# Patient Record
Sex: MALE | Race: WHITE | Employment: OTHER | URBAN - NONMETROPOLITAN AREA
[De-identification: names, ages, dates, MRNs, and addresses within clinical notes are randomized per-mention and may not be internally consistent; named-entity substitution may affect disease eponyms.]

---

## 2017-10-23 ENCOUNTER — OFFICE VISIT (OUTPATIENT)
Dept: INTERNAL MEDICINE | Age: 62
End: 2017-10-23
Payer: COMMERCIAL

## 2017-10-23 VITALS
DIASTOLIC BLOOD PRESSURE: 80 MMHG | BODY MASS INDEX: 27.21 KG/M2 | WEIGHT: 212 LBS | SYSTOLIC BLOOD PRESSURE: 120 MMHG | RESPIRATION RATE: 16 BRPM | HEIGHT: 74 IN | HEART RATE: 68 BPM

## 2017-10-23 DIAGNOSIS — D17.1 LIPOMA OF TORSO: ICD-10-CM

## 2017-10-23 DIAGNOSIS — Z12.5 SPECIAL SCREENING FOR MALIGNANT NEOPLASM OF PROSTATE: ICD-10-CM
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
## 2017-10-23 DIAGNOSIS — E78.5 HYPERLIPIDEMIA, UNSPECIFIED HYPERLIPIDEMIA TYPE: ICD-10-CM

## 2017-10-23 DIAGNOSIS — Z00.00 GENERAL MEDICAL EXAM: Primary | ICD-10-CM

## 2017-10-23 PROCEDURE — 99396 PREV VISIT EST AGE 40-64: CPT | Performed by: INTERNAL MEDICINE

## 2017-10-23 ASSESSMENT — ENCOUNTER SYMPTOMS
BACK PAIN: 0
NAUSEA: 0
CONSTIPATION: 0
EYE PAIN: 0
ABDOMINAL PAIN: 0
COUGH: 0
VOMITING: 0
DIARRHEA: 0
SHORTNESS OF BREATH: 0
BLOOD IN STOOL: 0

## 2017-10-23 ASSESSMENT — PATIENT HEALTH QUESTIONNAIRE - PHQ9
1. LITTLE INTEREST OR PLEASURE IN DOING THINGS: 0
SUM OF ALL RESPONSES TO PHQ QUESTIONS 1-9: 0
2. FEELING DOWN, DEPRESSED OR HOPELESS: 0
SUM OF ALL RESPONSES TO PHQ9 QUESTIONS 1 & 2: 0

## 2017-10-23 NOTE — PROGRESS NOTES
Michelle Lyle received counseling on the following healthy behaviors: exercise  Reviewed prior labs and health maintenance  Continue current medications except where noted below, diet and exercise. Discussed use, benefit, and side effects of prescribed medications. Barriers to medication compliance addressed. Patient given educational materials - see patient instructions  Was a self-tracking handout given in paper form or via [employer]? No    Requested Prescriptions      No prescriptions requested or ordered in this encounter       All patient questions answered. Patient voiced understanding. Quality Measures    Body mass index is 27.22 kg/m². Elevated. Weight control planned discussed: Healthy diet and regular exercise    BP: 120/80 Blood pressure is normal. Treatment plan consists of: see progress note below.       Lab Results   Component Value Date    LDLCHOLESTEROL 126 10/06/2016    (goal LDL reduction with dx if diabetes is 50% LDL reduction)      PHQ Scores 10/23/2017   PHQ2 Score 0   PHQ9 Score 0     Interpretation of Total Score Depression Severity: 1-4 = Minimal depression, 5-9 = Mild depression, 10-14 = Moderate depression, 15-19 = Moderately severe depression, 20-27 = Severe depression

## 2017-10-23 NOTE — PROGRESS NOTES
Chronic Disease Visit Information    BP Readings from Last 3 Encounters:   10/23/17 120/80   10/17/16 94/60   10/08/15 98/76          LDL Cholesterol (mg/dL)   Date Value   10/06/2016 126     HDL (mg/dL)   Date Value   10/06/2016 51     BUN (mg/dL)   Date Value   10/06/2016 21     CREATININE (mg/dL)   Date Value   10/06/2016 1.01     Glucose (mg/dL)   Date Value   10/06/2016 97            Have you changed or started any medications since your last visit including any over-the-counter medicines, vitamins, or herbal medicines? no   Are you having any side effects from any of your medications? -  no  Have you stopped taking any of your medications? Is so, why? -  no    Have you seen any other physician or provider since your last visit? No  Have you had any other diagnostic tests since your last visit? No  Have you been seen in the emergency room and/or had an admission to a hospital since we last saw you? No  Have you had your annual diabetic retinal (eye) exam? No/ NA  Have you had your routine dental cleaning in the past 6 months? yes - every 6 months    Have you activated your [employer] account? If not, what are your barriers?  Yes     Patient Care Team:  Faina Paredes MD as PCP - General (Internal Medicine)  Faina Paredes MD as PCP - [facility] Attributed Provider         Medical History Review  Past Medical, Family, and Social History reviewed and does contribute to the patient presenting condition    Health Maintenance   Topic Date Due    Zostavax vaccine  10/23/2018 (Originally 10/19/2015)    Flu vaccine (1) 10/23/2018 (Originally 9/1/2017)    Hepatitis C screen  10/23/2018 (Originally 1955)    HIV screen  10/23/2018 (Originally 10/19/1970)    Colon cancer screen colonoscopy  10/27/2020    Lipid screen  10/06/2021    DTaP/Tdap/Td vaccine (2 - Td) 05/19/2022

## 2017-10-23 NOTE — PATIENT INSTRUCTIONS

## 2017-10-24 ENCOUNTER — HOSPITAL ENCOUNTER (OUTPATIENT)
Dept: LAB | Age: 62
Setting detail: SPECIMEN
Discharge: HOME | End: 2017-10-24
Payer: COMMERCIAL

## 2017-10-24 DIAGNOSIS — Z12.5 SPECIAL SCREENING FOR MALIGNANT NEOPLASM OF PROSTATE: ICD-10-CM

## 2017-10-24 DIAGNOSIS — E78.5 HYPERLIPIDEMIA, UNSPECIFIED HYPERLIPIDEMIA TYPE: ICD-10-CM

## 2017-10-24 DIAGNOSIS — Z00.00 GENERAL MEDICAL EXAM: ICD-10-CM

## 2017-10-24 LAB
ALBUMIN SERPL-MCNC: 4.1 G/DL (ref 3.5–5.2)
ALBUMIN/GLOBULIN RATIO: 1.4 (ref 1–2.5)
ALP BLD-CCNC: 63 U/L (ref 40–129)
ALT SERPL-CCNC: 19 U/L (ref 5–41)
ANION GAP SERPL CALCULATED.3IONS-SCNC: 11 MMOL/L (ref 9–17)
AST SERPL-CCNC: 20 U/L
BILIRUB SERPL-MCNC: 0.84 MG/DL (ref 0.3–1.2)
BUN BLDV-MCNC: 23 MG/DL (ref 8–23)
BUN/CREAT BLD: 26 (ref 9–20)
CALCIUM SERPL-MCNC: 9.4 MG/DL (ref 8.6–10.4)
CHLORIDE BLD-SCNC: 99 MMOL/L (ref 98–107)
CHOLESTEROL/HDL RATIO: 4.9
CHOLESTEROL: 201 MG/DL
CO2: 29 MMOL/L (ref 20–31)
CREAT SERPL-MCNC: 0.9 MG/DL (ref 0.7–1.2)
GFR AFRICAN AMERICAN: >60 ML/MIN
GFR NON-AFRICAN AMERICAN: >60 ML/MIN
GFR SERPL CREATININE-BSD FRML MDRD: ABNORMAL ML/MIN/{1.73_M2}
GFR SERPL CREATININE-BSD FRML MDRD: ABNORMAL ML/MIN/{1.73_M2}
GLUCOSE BLD-MCNC: 101 MG/DL (ref 70–99)
HDLC SERPL-MCNC: 41 MG/DL
LDL CHOLESTEROL: 134 MG/DL (ref 0–130)
POTASSIUM SERPL-SCNC: 4.1 MMOL/L (ref 3.7–5.3)
PROSTATE SPECIFIC ANTIGEN: 2.47 UG/L
SODIUM BLD-SCNC: 139 MMOL/L (ref 135–144)
TOTAL PROTEIN: 7 G/DL (ref 6.4–8.3)
TRIGL SERPL-MCNC: 130 MG/DL
VLDLC SERPL CALC-MCNC: ABNORMAL MG/DL (ref 1–30)

## 2017-10-24 PROCEDURE — 80061 LIPID PANEL: CPT

## 2017-10-24 PROCEDURE — 36415 COLL VENOUS BLD VENIPUNCTURE: CPT

## 2017-10-24 PROCEDURE — G0103 PSA SCREENING: HCPCS

## 2017-10-24 PROCEDURE — 80053 COMPREHEN METABOLIC PANEL: CPT

## 2017-10-25 DIAGNOSIS — E78.5 HYPERLIPIDEMIA, UNSPECIFIED HYPERLIPIDEMIA TYPE: Primary | ICD-10-CM

## 2017-10-25 DIAGNOSIS — Z12.5 SPECIAL SCREENING FOR MALIGNANT NEOPLASM OF PROSTATE: ICD-10-CM

## 2018-10-24 ENCOUNTER — HOSPITAL ENCOUNTER (OUTPATIENT)
Dept: LAB | Age: 63
Discharge: HOME | End: 2018-10-24
Payer: COMMERCIAL

## 2018-10-24 ENCOUNTER — OFFICE VISIT (OUTPATIENT)
Dept: INTERNAL MEDICINE | Age: 63
End: 2018-10-24
Payer: COMMERCIAL

## 2018-10-24 VITALS
BODY MASS INDEX: 26.05 KG/M2 | DIASTOLIC BLOOD PRESSURE: 70 MMHG | SYSTOLIC BLOOD PRESSURE: 115 MMHG | HEART RATE: 68 BPM | WEIGHT: 203 LBS | RESPIRATION RATE: 16 BRPM | HEIGHT: 74 IN

## 2018-10-24 DIAGNOSIS — E78.5 HYPERLIPIDEMIA, UNSPECIFIED HYPERLIPIDEMIA TYPE: ICD-10-CM

## 2018-10-24 DIAGNOSIS — Z00.00 GENERAL MEDICAL EXAM: Primary | ICD-10-CM

## 2018-10-24 DIAGNOSIS — D17.1 LIPOMA OF TORSO: ICD-10-CM

## 2018-10-24 DIAGNOSIS — Z12.5 SPECIAL SCREENING FOR MALIGNANT NEOPLASM OF PROSTATE: ICD-10-CM

## 2018-10-24 DIAGNOSIS — Z12.5 ENCOUNTER FOR SCREENING FOR MALIGNANT NEOPLASM OF PROSTATE: ICD-10-CM

## 2018-10-24 LAB
ALBUMIN SERPL-MCNC: 4.1 G/DL (ref 3.5–5.2)
ALBUMIN/GLOBULIN RATIO: 1.3 (ref 1–2.5)
ALP BLD-CCNC: 61 U/L (ref 40–129)
ALT SERPL-CCNC: 19 U/L (ref 5–41)
ANION GAP SERPL CALCULATED.3IONS-SCNC: 11 MMOL/L (ref 9–17)
AST SERPL-CCNC: 20 U/L
BILIRUB SERPL-MCNC: 0.76 MG/DL (ref 0.3–1.2)
BUN BLDV-MCNC: 24 MG/DL (ref 8–23)
BUN/CREAT BLD: 24 (ref 9–20)
CALCIUM SERPL-MCNC: 9.2 MG/DL (ref 8.6–10.4)
CHLORIDE BLD-SCNC: 101 MMOL/L (ref 98–107)
CHOLESTEROL/HDL RATIO: 3.9
CHOLESTEROL: 178 MG/DL
CO2: 28 MMOL/L (ref 20–31)
CREAT SERPL-MCNC: 1.01 MG/DL (ref 0.7–1.2)
GFR AFRICAN AMERICAN: >60 ML/MIN
GFR NON-AFRICAN AMERICAN: >60 ML/MIN
GFR SERPL CREATININE-BSD FRML MDRD: ABNORMAL ML/MIN/{1.73_M2}
GFR SERPL CREATININE-BSD FRML MDRD: ABNORMAL ML/MIN/{1.73_M2}
GLUCOSE BLD-MCNC: 97 MG/DL (ref 70–99)
HDLC SERPL-MCNC: 46 MG/DL
LDL CHOLESTEROL: 111 MG/DL (ref 0–130)
POTASSIUM SERPL-SCNC: 4.3 MMOL/L (ref 3.7–5.3)
PROSTATE SPECIFIC ANTIGEN: 2.15 UG/L
SODIUM BLD-SCNC: 140 MMOL/L (ref 135–144)
TOTAL PROTEIN: 7.2 G/DL (ref 6.4–8.3)
TRIGL SERPL-MCNC: 106 MG/DL
VLDLC SERPL CALC-MCNC: NORMAL MG/DL (ref 1–30)

## 2018-10-24 PROCEDURE — 80053 COMPREHEN METABOLIC PANEL: CPT

## 2018-10-24 PROCEDURE — G0103 PSA SCREENING: HCPCS

## 2018-10-24 PROCEDURE — 99396 PREV VISIT EST AGE 40-64: CPT | Performed by: INTERNAL MEDICINE

## 2018-10-24 PROCEDURE — 36415 COLL VENOUS BLD VENIPUNCTURE: CPT

## 2018-10-24 PROCEDURE — 80061 LIPID PANEL: CPT

## 2018-10-24 ASSESSMENT — ENCOUNTER SYMPTOMS
ABDOMINAL PAIN: 0
NAUSEA: 0
VOMITING: 0
DIARRHEA: 0
SHORTNESS OF BREATH: 0
BLOOD IN STOOL: 0
BACK PAIN: 0
COUGH: 0
EYE PAIN: 0
CONSTIPATION: 0

## 2018-10-24 ASSESSMENT — PATIENT HEALTH QUESTIONNAIRE - PHQ9
1. LITTLE INTEREST OR PLEASURE IN DOING THINGS: 0
SUM OF ALL RESPONSES TO PHQ QUESTIONS 1-9: 0
SUM OF ALL RESPONSES TO PHQ9 QUESTIONS 1 & 2: 0
SUM OF ALL RESPONSES TO PHQ QUESTIONS 1-9: 0
2. FEELING DOWN, DEPRESSED OR HOPELESS: 0

## 2018-10-24 NOTE — PROGRESS NOTES
Maretta Bumpers received counseling on the following healthy behaviors: exercise  Reviewed prior labs and health maintenance  Continue current medications except where noted below, diet and exercise. Discussed use, benefit, and side effects of prescribed medications. Barriers to medication compliance addressed. Patient given educational materials - see patient instructions  Was a self-tracking handout given in paper form or via [employer]? Yes    Requested Prescriptions      No prescriptions requested or ordered in this encounter       All patient questions answered. Patient voiced understanding. Quality Measures    Body mass index is 26.06 kg/m². Elevated. Weight control planned discussed: Healthy diet and regular exercise    BP: 115/70 Blood pressure is normal. Treatment plan consists of: see progress note below.       Lab Results   Component Value Date    LDLCHOLESTEROL 111 10/24/2018    (goal LDL reduction with dx if diabetes is 50% LDL reduction)      PHQ Scores 10/24/2018 10/23/2017   PHQ2 Score 0 0   PHQ9 Score 0 0     Interpretation of Total Score Depression Severity: 1-4 = Minimal depression, 5-9 = Mild depression, 10-14 = Moderate depression, 15-19 = Moderately severe depression, 20-27 = Severe depression

## 2019-10-28 ENCOUNTER — OFFICE VISIT (OUTPATIENT)
Dept: INTERNAL MEDICINE | Age: 64
End: 2019-10-28
Payer: COMMERCIAL

## 2019-10-28 ENCOUNTER — HOSPITAL ENCOUNTER (OUTPATIENT)
Dept: LAB | Age: 64
Discharge: HOME | End: 2019-10-28
Payer: COMMERCIAL

## 2019-10-28 VITALS
WEIGHT: 210 LBS | HEART RATE: 72 BPM | DIASTOLIC BLOOD PRESSURE: 60 MMHG | BODY MASS INDEX: 26.95 KG/M2 | HEIGHT: 74 IN | RESPIRATION RATE: 16 BRPM | SYSTOLIC BLOOD PRESSURE: 112 MMHG

## 2019-10-28 DIAGNOSIS — Z00.00 GENERAL MEDICAL EXAM: Primary | ICD-10-CM

## 2019-10-28 DIAGNOSIS — D17.1 LIPOMA OF TORSO: ICD-10-CM

## 2019-10-28 DIAGNOSIS — E78.5 HYPERLIPIDEMIA, UNSPECIFIED HYPERLIPIDEMIA TYPE: ICD-10-CM

## 2019-10-28 DIAGNOSIS — Z12.5 SPECIAL SCREENING FOR MALIGNANT NEOPLASM OF PROSTATE: ICD-10-CM

## 2019-10-28 DIAGNOSIS — Z12.5 ENCOUNTER FOR SCREENING FOR MALIGNANT NEOPLASM OF PROSTATE: ICD-10-CM

## 2019-10-28 LAB
CHOLESTEROL/HDL RATIO: 4.2
CHOLESTEROL: 194 MG/DL
HDLC SERPL-MCNC: 46 MG/DL
LDL CHOLESTEROL: 117 MG/DL (ref 0–130)
PROSTATE SPECIFIC ANTIGEN: 2.82 UG/L
TRIGL SERPL-MCNC: 153 MG/DL
VLDLC SERPL CALC-MCNC: ABNORMAL MG/DL (ref 1–30)

## 2019-10-28 PROCEDURE — 90471 IMMUNIZATION ADMIN: CPT | Performed by: INTERNAL MEDICINE

## 2019-10-28 PROCEDURE — 80061 LIPID PANEL: CPT

## 2019-10-28 PROCEDURE — G0103 PSA SCREENING: HCPCS

## 2019-10-28 PROCEDURE — 90686 IIV4 VACC NO PRSV 0.5 ML IM: CPT | Performed by: INTERNAL MEDICINE

## 2019-10-28 PROCEDURE — 99396 PREV VISIT EST AGE 40-64: CPT | Performed by: INTERNAL MEDICINE

## 2019-10-28 PROCEDURE — 36415 COLL VENOUS BLD VENIPUNCTURE: CPT

## 2019-10-28 ASSESSMENT — ENCOUNTER SYMPTOMS
BLOOD IN STOOL: 0
CONSTIPATION: 0
EYE PAIN: 0
SHORTNESS OF BREATH: 0
ABDOMINAL PAIN: 0
BACK PAIN: 0
NAUSEA: 0
DIARRHEA: 0
COUGH: 0
VOMITING: 0

## 2020-09-21 ENCOUNTER — OFFICE VISIT (OUTPATIENT)
Dept: INTERNAL MEDICINE | Age: 65
End: 2020-09-21
Payer: COMMERCIAL

## 2020-09-21 ENCOUNTER — HOSPITAL ENCOUNTER (OUTPATIENT)
Dept: GENERAL RADIOLOGY | Age: 65
Discharge: HOME | End: 2020-09-23
Payer: COMMERCIAL

## 2020-09-21 ENCOUNTER — HOSPITAL ENCOUNTER (OUTPATIENT)
Dept: LAB | Age: 65
Discharge: HOME | End: 2020-09-21
Payer: COMMERCIAL

## 2020-09-21 VITALS
HEART RATE: 84 BPM | RESPIRATION RATE: 16 BRPM | WEIGHT: 212 LBS | HEIGHT: 74 IN | SYSTOLIC BLOOD PRESSURE: 102 MMHG | BODY MASS INDEX: 27.21 KG/M2 | DIASTOLIC BLOOD PRESSURE: 84 MMHG

## 2020-09-21 PROBLEM — R06.09 DOE (DYSPNEA ON EXERTION): Status: ACTIVE | Noted: 2020-09-21

## 2020-09-21 LAB
ALBUMIN SERPL-MCNC: 4.2 G/DL (ref 3.5–5.2)
ALBUMIN/GLOBULIN RATIO: 1.3 (ref 1–2.5)
ALP BLD-CCNC: 69 U/L (ref 40–129)
ALT SERPL-CCNC: 14 U/L (ref 5–41)
ANION GAP SERPL CALCULATED.3IONS-SCNC: 8 MMOL/L (ref 9–17)
AST SERPL-CCNC: 15 U/L
BILIRUB SERPL-MCNC: 0.79 MG/DL (ref 0.3–1.2)
BUN BLDV-MCNC: 28 MG/DL (ref 8–23)
BUN/CREAT BLD: 26 (ref 9–20)
CALCIUM SERPL-MCNC: 9.5 MG/DL (ref 8.6–10.4)
CHLORIDE BLD-SCNC: 104 MMOL/L (ref 98–107)
CHOLESTEROL/HDL RATIO: 4.5
CHOLESTEROL: 191 MG/DL
CO2: 26 MMOL/L (ref 20–31)
CREAT SERPL-MCNC: 1.07 MG/DL (ref 0.7–1.2)
GFR AFRICAN AMERICAN: >60 ML/MIN
GFR NON-AFRICAN AMERICAN: >60 ML/MIN
GFR SERPL CREATININE-BSD FRML MDRD: ABNORMAL ML/MIN/{1.73_M2}
GFR SERPL CREATININE-BSD FRML MDRD: ABNORMAL ML/MIN/{1.73_M2}
GLUCOSE BLD-MCNC: 108 MG/DL (ref 70–99)
HDLC SERPL-MCNC: 42 MG/DL
LDL CHOLESTEROL: 120 MG/DL (ref 0–130)
POTASSIUM SERPL-SCNC: 4.4 MMOL/L (ref 3.7–5.3)
PROSTATE SPECIFIC ANTIGEN: 3.12 UG/L
SODIUM BLD-SCNC: 138 MMOL/L (ref 135–144)
TOTAL PROTEIN: 7.5 G/DL (ref 6.4–8.3)
TRIGL SERPL-MCNC: 145 MG/DL
VLDLC SERPL CALC-MCNC: NORMAL MG/DL (ref 1–30)

## 2020-09-21 PROCEDURE — 90471 IMMUNIZATION ADMIN: CPT | Performed by: INTERNAL MEDICINE

## 2020-09-21 PROCEDURE — 99396 PREV VISIT EST AGE 40-64: CPT | Performed by: INTERNAL MEDICINE

## 2020-09-21 PROCEDURE — 80053 COMPREHEN METABOLIC PANEL: CPT

## 2020-09-21 PROCEDURE — 90686 IIV4 VACC NO PRSV 0.5 ML IM: CPT | Performed by: INTERNAL MEDICINE

## 2020-09-21 PROCEDURE — G0103 PSA SCREENING: HCPCS

## 2020-09-21 PROCEDURE — 80061 LIPID PANEL: CPT

## 2020-09-21 PROCEDURE — 71046 X-RAY EXAM CHEST 2 VIEWS: CPT

## 2020-09-21 PROCEDURE — 36415 COLL VENOUS BLD VENIPUNCTURE: CPT

## 2020-09-21 ASSESSMENT — ENCOUNTER SYMPTOMS
EYE PAIN: 0
VOMITING: 0
DIARRHEA: 0
SHORTNESS OF BREATH: 1
BLOOD IN STOOL: 0
CONSTIPATION: 0
ABDOMINAL PAIN: 0
COUGH: 0
BACK PAIN: 0
NAUSEA: 0

## 2020-09-21 ASSESSMENT — PATIENT HEALTH QUESTIONNAIRE - PHQ9
2. FEELING DOWN, DEPRESSED OR HOPELESS: 0
1. LITTLE INTEREST OR PLEASURE IN DOING THINGS: 0
SUM OF ALL RESPONSES TO PHQ QUESTIONS 1-9: 0
SUM OF ALL RESPONSES TO PHQ QUESTIONS 1-9: 0
SUM OF ALL RESPONSES TO PHQ9 QUESTIONS 1 & 2: 0

## 2020-09-21 NOTE — PROGRESS NOTES
[place] [address]  Dept: [phone]  Dept Fax: [phone]  Loc: [phone]     Fernanda Farrell is a 59 y.o. male who presents today for his medical conditions/complaintsas noted below. Fernanda Farrell is c/o of   Chief Complaint   Patient presents with    Annual Exam    Hyperlipidemia    Lipoma    Shortness of Breath     x 1 month ago, when i lay back i can feel a \"gurgle in my chest\" not as bad anymore. \"I was getting more SOB with Excertion\". Felt like patient couldnt catch breath. -that was 3 weeks ago. Feeling much better SOB wise in the past week. HPI:     Hyperlipidemia   This is a chronic problem. The current episode started more than 1 year ago. The problem is controlled. Recent lipid tests were reviewed and are variable. Associated symptoms include shortness of breath. Pertinent negatives include no chest pain. Shortness of Breath   This is a recurrent (Dyspnea on exertion) problem. The current episode started 1 to 4 weeks ago. The problem occurs intermittently. The problem has been waxing and waning. Pertinent negatives include no abdominal pain, chest pain, fever, headaches, leg swelling, neck pain, rash or vomiting. Other   This is a recurrent (3-General medical exam,  4-dyspnea on exertion) problem. The current episode started today. The problem occurs intermittently. The problem has been waxing and waning. Pertinent negatives include no abdominal pain, arthralgias, chest pain, chills, coughing, fever, headaches, nausea, neck pain, numbness, rash, vomiting or weakness.        No results found for: LABA1C         No results found for: LABMICR  LDL Cholesterol (mg/dL)   Date Value   09/21/2020 120   10/28/2019 117   10/24/2018 111         AST (U/L)   Date Value   09/21/2020 15     ALT (U/L)   Date Value   09/21/2020 14     BUN (mg/dL)   Date Value 09/21/2020 28 (H)     BP Readings from Last 3 Encounters:   09/21/20 102/84   10/28/19 112/60   10/24/18 115/70              Past Medical History:   Diagnosis Date    Adenocarcinoma (HCC)     Lip.  Hyperlipidemia       Past Surgical History:   Procedure Laterality Date    COLONOSCOPY  10/27/2010    Showed multiple internal hemorrhoids.  MOUTH SURGERY  01/2007    Lip surgery. Family History   Problem Relation Age of Onset    Heart Attack Father         Myocardial infarction in his 76s.  Diabetes Father     Glaucoma Mother           Social History     Tobacco Use    Smoking status: Never Smoker    Smokeless tobacco: Never Used   Substance Use Topics    Alcohol use: No         No current outpatient medications on file. No current facility-administered medications for this visit. Allergies   Allergen Reactions    Penicillins Other (See Comments)     As a child- unsure of reaction       Health Maintenance   Topic Date Due    Diabetes screen  10/19/1995    Shingles Vaccine (1 of 2) 10/28/2020 (Originally 10/19/2005)    Hepatitis C screen  10/28/2020 (Originally 1955)    HIV screen  10/28/2020 (Originally 10/19/1970)    Colon cancer screen colonoscopy  10/27/2020    DTaP/Tdap/Td vaccine (2 - Td) 05/19/2022    Lipid screen  09/21/2025    Flu vaccine  Completed    Hepatitis A vaccine  Aged Out    Hepatitis B vaccine  Aged Out    Hib vaccine  Aged Out    Meningococcal (ACWY) vaccine  Aged Out    Pneumococcal 0-64 years Vaccine  Aged Out       Subjective:      Review of Systems   Constitutional: Negative for chills and fever. HENT: Negative for hearing loss. Eyes: Negative for pain and visual disturbance. Respiratory: Positive for shortness of breath. Negative for cough. Cardiovascular: Negative for chest pain, palpitations and leg swelling. Gastrointestinal: Negative for abdominal pain, blood in stool, constipation, diarrhea, nausea and vomiting.    Endocrine: Negative for cold intolerance, polydipsia and polyuria. Genitourinary: Negative for difficulty urinating, dysuria and hematuria. Musculoskeletal: Negative for arthralgias, back pain, gait problem and neck pain. Skin: Negative for pallor and rash. Neurological: Negative for dizziness, weakness, numbness and headaches. Hematological: Negative for adenopathy. Does not bruise/bleed easily. Psychiatric/Behavioral: Negative for confusion. The patient is not nervous/anxious. Objective:     Physical Exam  Vitals signs reviewed. Constitutional:       Appearance: He is well-developed. HENT:      Head: Normocephalic and atraumatic. Eyes:      Pupils: Pupils are equal, round, and reactive to light. Neck:      Musculoskeletal: Neck supple. Cardiovascular:      Rate and Rhythm: Normal rate and regular rhythm. Heart sounds: No murmur. No friction rub. No gallop. Pulmonary:      Effort: Pulmonary effort is normal.      Breath sounds: Normal breath sounds. No wheezing or rales. Abdominal:      General: There is no distension. Palpations: Abdomen is soft. There is no mass. Tenderness: There is no abdominal tenderness. There is no rebound. Musculoskeletal: Normal range of motion. Lymphadenopathy:      Cervical: No cervical adenopathy. Skin:     General: Skin is warm and dry. Findings: No rash. Neurological:      Mental Status: He is alert and oriented to person, place, and time. Cranial Nerves: No cranial nerve deficit (grossly). Psychiatric:         Thought Content: Thought content normal.        /84 (Site: Left Upper Arm, Position: Sitting, Cuff Size: Medium Adult)   Pulse 84   Resp 16   Ht 6' 2\" (1.88 m)   Wt 212 lb (96.2 kg)   BMI 27.22 kg/m²     Assessment:       Diagnosis Orders   1. General medical exam  Comprehensive Metabolic Panel   2. ENAMORADO (dyspnea on exertion)  XR CHEST STANDARD (2 VW)    [facility], [place]   3. Hyperlipidemia, unspecified hyperlipidemia type  Lipid Panel   4. Cough  XR CHEST STANDARD (2 VW)   5. Encounter for screening colonoscopy  Ambulatory referral to General Surgery   6. Special screening for malignant neoplasm of prostate  Psa screening             Plan:       Return in about 2 months (around 11/21/2020) for Dyspnea on exertion,  , Hyperlipidemia. Orders Placed This Encounter   Procedures    XR CHEST STANDARD (2 VW)     Standing Status:   Future     Standing Expiration Date:   9/21/2021    INFLUENZA, QUADV, 3 YRS AND OLDER, IM PF, PREFILL SYR OR SDV, 0.5ML (AFLURIA QUADV, PF)    Lipid Panel     Standing Status:   Future     Standing Expiration Date:   3/21/2022     Order Specific Question:   Is Patient Fasting?/# of Hours     Answer:   yes    Comprehensive Metabolic Panel     Standing Status:   Future     Standing Expiration Date:   3/21/2022    Psa screening     Standing Status:   Future     Standing Expiration Date:   3/21/2022    Ambulatory referral to General Surgery     Referral Priority:   Routine     Referral Type:   Consult for Advice and Opinion     Referral Reason:   Specialty Services Required     Requested Specialty:   General Surgery     Number of Visits Requested:   [facility], [place]     Referral Priority:   Routine     Referral Type:   Eval and Treat     Referral Reason:   Specialty Services Required     Requested Specialty:   Cardiology     Number of Visits Requested:   1     No orders of the defined types were placed in this encounter. Patientgiven educational materials - see patient instructions. Discussed use, benefit,and side effects of prescribed medications. All patient questions answered. Ptvoiced understanding. Reviewed health maintenance. Instructed to continue currentmedications, diet and exercise. Patient agreed with treatment plan. Follow up asdirected.      Electronically signed by Palmer Snowden MD on 9/21/2020 at 9:52 AM

## 2020-09-21 NOTE — PROGRESS NOTES
Have you had an allergic reaction to the flu (influenza) shot? no  Are you allergic to eggs or any component of the flu vaccine? no  Do you have a history of Guillain-Oley Syndrome (GBS), which is paralysis after receiving the flu vaccine? no  Are you feeling well today? yes  Flu vaccine given as ordered. Patient tolerated it well. No questions re: VIS information.

## 2020-09-22 ENCOUNTER — TELEPHONE (OUTPATIENT)
Dept: SURGERY | Age: 65
End: 2020-09-22

## 2020-10-02 ENCOUNTER — OFFICE VISIT (OUTPATIENT)
Dept: CARDIOLOGY | Age: 65
End: 2020-10-02
Payer: MEDICARE

## 2020-10-02 VITALS
SYSTOLIC BLOOD PRESSURE: 102 MMHG | WEIGHT: 212 LBS | DIASTOLIC BLOOD PRESSURE: 68 MMHG | HEIGHT: 74 IN | BODY MASS INDEX: 27.21 KG/M2 | HEART RATE: 90 BPM

## 2020-10-02 PROCEDURE — G8482 FLU IMMUNIZE ORDER/ADMIN: HCPCS | Performed by: INTERNAL MEDICINE

## 2020-10-02 PROCEDURE — 3017F COLORECTAL CA SCREEN DOC REV: CPT | Performed by: INTERNAL MEDICINE

## 2020-10-02 PROCEDURE — G8419 CALC BMI OUT NRM PARAM NOF/U: HCPCS | Performed by: INTERNAL MEDICINE

## 2020-10-02 PROCEDURE — 99203 OFFICE O/P NEW LOW 30 MIN: CPT | Performed by: INTERNAL MEDICINE

## 2020-10-02 PROCEDURE — G8427 DOCREV CUR MEDS BY ELIG CLIN: HCPCS | Performed by: INTERNAL MEDICINE

## 2020-10-02 PROCEDURE — 93000 ELECTROCARDIOGRAM COMPLETE: CPT | Performed by: INTERNAL MEDICINE

## 2020-10-02 PROCEDURE — 1036F TOBACCO NON-USER: CPT | Performed by: INTERNAL MEDICINE

## 2020-10-02 ASSESSMENT — ENCOUNTER SYMPTOMS
CHEST TIGHTNESS: 0
ABDOMINAL PAIN: 0
EYE ITCHING: 0
NAUSEA: 0
EYE DISCHARGE: 0
VOMITING: 0
ABDOMINAL DISTENTION: 0
SHORTNESS OF BREATH: 1

## 2020-10-02 NOTE — PROGRESS NOTES
Today's Date: 10/2/2020  Patient's Name: Seda Kruse  Patient's age: 59 y.o., 1955    Subjective: The patient is a 59y.o. year old, , male is in the office for SOB  Has been complaining of shortness of breath on exertion, for a month, relieved by rest, no chest pain, no dizziness or syncope. No palpitations. Past Medical History:   has a past medical history of Adenocarcinoma ([address] and Hyperlipidemia. Past Surgical History:   has a past surgical history that includes Colonoscopy (10/27/2010) and Mouth surgery (01/2007). Home Medications:  Prior to Admission medications    Not on File       Allergies:  Penicillins    Social History:   reports that he has never smoked. He has never used smokeless tobacco. He reports that he does not drink alcohol or use drugs. Review of Systems:  Review of Systems   Constitutional: Negative for chills, fatigue and fever. HENT: Negative for congestion and dental problem. Eyes: Negative for discharge and itching. Respiratory: Positive for shortness of breath. Negative for chest tightness. Cardiovascular: Negative for chest pain and palpitations. Gastrointestinal: Negative for abdominal distention, abdominal pain, nausea and vomiting. Endocrine: Negative for cold intolerance and heat intolerance. Genitourinary: Negative for difficulty urinating and dysuria. Musculoskeletal: Negative for arthralgias. Neurological: Negative for dizziness and facial asymmetry. Hematological: Negative for adenopathy. Psychiatric/Behavioral: Negative for agitation and behavioral problems. Physical Exam:  /68    Physical Exam  Constitutional:       Appearance: Normal appearance. He is not ill-appearing. HENT:      Head: Normocephalic and atraumatic. Mouth/Throat:      Mouth: Mucous membranes are moist.   Neck:      Musculoskeletal: Normal range of motion. No neck rigidity or muscular tenderness.    Cardiovascular:      Rate and Rhythm: Normal rate and regular rhythm. Pulses: Normal pulses. Heart sounds: Normal heart sounds. No murmur. Pulmonary:      Effort: Pulmonary effort is normal. No respiratory distress. Breath sounds: Normal breath sounds. No stridor. Abdominal:      General: Abdomen is flat. There is no distension. Palpations: Abdomen is soft. There is no mass. Musculoskeletal:         General: No swelling or tenderness. Skin:     Capillary Refill: Capillary refill takes less than 2 seconds. Coloration: Skin is not jaundiced or pale. Neurological:      General: No focal deficit present. Mental Status: He is alert and oriented to person, place, and time. Psychiatric:         Mood and Affect: Mood normal.         Behavior: Behavior normal.         Cardiac Data:  EKG: NSR, LAFB. Labs:     CBC: No results for input(s): WBC, HGB, HCT, PLT in the last 72 hours. BMP:No results for input(s): NA, K, CO2, BUN, CREATININE, LABGLOM, GLUCOSE in the last 72 hours. PT/INR: No results for input(s): PROTIME, INR in the last 72 hours. FASTING LIPID PANEL:  Lab Results   Component Value Date    HDL 42 09/21/2020    TRIG 145 09/21/2020     LIVER PROFILE:No results for input(s): AST, ALT, LABALBU in the last 72 hours. IMPRESSION:    Patient Active Problem List   Diagnosis    Hyperlipidemia    Lipoma of torso    ENAMORADO (dyspnea on exertion)       Assessment/Plan:  Dyspnea on exertion. Will plan for Treadmill Cardiolite stress and echo.          Axel Kilgore MD  [facility]           [phone]

## 2020-10-08 ENCOUNTER — HOSPITAL ENCOUNTER (OUTPATIENT)
Dept: NON INVASIVE DIAGNOSTICS | Age: 65
Discharge: HOME | End: 2020-10-08
Payer: MEDICARE

## 2020-10-08 ENCOUNTER — TELEPHONE (OUTPATIENT)
Dept: CARDIOLOGY | Age: 65
End: 2020-10-08

## 2020-10-08 ENCOUNTER — HOSPITAL ENCOUNTER (OUTPATIENT)
Dept: NUCLEAR MEDICINE | Age: 65
Discharge: HOME | End: 2020-10-10
Payer: MEDICARE

## 2020-10-08 LAB
LV EF: 20 %
LVEF MODALITY: NORMAL

## 2020-10-08 PROCEDURE — 93017 CV STRESS TEST TRACING ONLY: CPT

## 2020-10-08 PROCEDURE — A9500 TC99M SESTAMIBI: HCPCS | Performed by: INTERNAL MEDICINE

## 2020-10-08 PROCEDURE — 3430000000 HC RX DIAGNOSTIC RADIOPHARMACEUTICAL: Performed by: INTERNAL MEDICINE

## 2020-10-08 PROCEDURE — 78452 HT MUSCLE IMAGE SPECT MULT: CPT

## 2020-10-08 PROCEDURE — 6360000002 HC RX W HCPCS: Performed by: INTERNAL MEDICINE

## 2020-10-08 PROCEDURE — 93306 TTE W/DOPPLER COMPLETE: CPT

## 2020-10-08 RX ORDER — AMINOPHYLLINE DIHYDRATE 25 MG/ML
100 INJECTION, SOLUTION INTRAVENOUS ONCE
Status: COMPLETED | OUTPATIENT
Start: 2020-10-08 | End: 2020-10-08

## 2020-10-08 RX ADMIN — TETRAKIS(2-METHOXYISOBUTYLISOCYANIDE)COPPER(I) TETRAFLUOROBORATE 10 MILLICURIE: 1 INJECTION, POWDER, LYOPHILIZED, FOR SOLUTION INTRAVENOUS at 08:30

## 2020-10-08 RX ADMIN — AMINOPHYLLINE 100 MG: 25 INJECTION, SOLUTION INTRAVENOUS at 10:26

## 2020-10-08 RX ADMIN — TETRAKIS(2-METHOXYISOBUTYLISOCYANIDE)COPPER(I) TETRAFLUOROBORATE 30 MILLICURIE: 1 INJECTION, POWDER, LYOPHILIZED, FOR SOLUTION INTRAVENOUS at 10:18

## 2020-10-08 RX ADMIN — REGADENOSON 0.4 MG: 0.08 INJECTION, SOLUTION INTRAVENOUS at 10:17

## 2020-10-08 NOTE — TELEPHONE ENCOUNTER
Cath orders placed by MT. Pt notified of very abnormal results and aware that [address] will set up cath next week. I urged pt to go to ER immediately for onset of any cardiac symptoms, also recommended that he not exert himself prior, and he states understanding and accepts. Orders faxed to [address] who will call pt ASAP.

## 2020-10-08 NOTE — PROGRESS NOTES
Patient unable to achieve target heart rate for Stress Cardiolite. Verbal order per Dr. Grace Henry to switch to [facility]. Patient Name:  Davidson Steiner MRN:  [id number]   :  1955  Age:  59 y.o. Sex: male   Ordering Provider:   Nuris Alvarenga MD  Referring Provider:   Primary Care Provider:  Fox Lopez MD     Indications: Chest pain; Dyspnea on Exertion     Medications:     Current Outpatient Medications:     Loratadine (CLARITIN PO), Take 1 tablet by mouth 2 times daily as needed, Disp: , Rfl:     Current Facility-Administered Medications:     regadenoson (LEXISCAN) injection 0.4 mg, 0.4 mg, Intravenous, Once, Nuris Alvarenga MD      ----------------------------------------------------------------------------------------------------------                Lexiscan 0.4 mg injected over 10 seconds. IV Cardiolite injected 20 seconds post Lexiscan injection. Heart Rate:  91  Resting Blood Pressure:  112/70   ----------------------------------------------------------------------------------------------------------     HR BP   1 min 93 110/62   2 min     3 min 83 107/62   4 min     5 min 99 108/71   6 min     7 min 92 111/75   8 min     9 min 90 115/75   10 min     11 min  87  117/74  13 min  86  119/78    Symptoms:  Chest Pain:  No      Nausea:  Yes     Headache:  No    Shortness of Breath:  Yes     Other:  Yes - Pressure in Head      Aminophylline given:  100 mg        Aminophylline wasted:  400 mg     Electronically signed by Juliet Ramirez RN on 10/8/20 at 10:12 AM EDT    ----------------------------------------------------------------------------------------------------------  Resting EKG:  NSR, 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 Arrhythmias:  Occasional PVCs. EKG Changes:  No ischemic ECG changes seen        Interpretation:  Treadmill stress was switched to Merlene Pean as the patient was SOB and not able to exercise.  Abnormal baseline ECG, no ischemic ECG changes seen with injection. Supervising Physician:  Jonathan Parra.  Evelyn Cramer MD.

## 2020-10-08 NOTE — PROCEDURES
[address]                 [address] [place], [place] [address]                              CARDIAC STRESS TEST    PATIENT NAME: Aníbal Ayoub              :        1955  MED REC NO:   [id number]                             ROOM:  ACCOUNT NO:   [de-identified]                           ADMIT DATE: 10/08/2020  PROVIDER:     Mark Heredia    DATE OF STUDY:  10/08/2020    STRESS TEST    Ordering Provider:  John Pacheco MD    Primary Care Provider: Cierra Smith MD    Patient's Age: 59     Height: 6 feet 2 inches  Weight: 212 pounds    INDICATION:  Chest pain, shortness of breath. Lexiscan 0.4 mg injected over 10 seconds. IV Cardiolite injected 20 seconds post Lexiscan injection. Heart Rate: 91 Resting blood pressure:  112/70              HR   BP  1 min          93   110/62  2 min  3 min          83   107/62  4 min  5 min          99   108/71  6 min  7 min          92   111/75  8 min  9 min          90   115/75  10 min  11 min    87   117/74  13 min    86   119/78    Symptoms:  Chest Pain: No  Nausea: Yes  Headache:  No  Shortness of  breath: Yes  Other: Yes    Aminophyllin given: 100 mg    Aminophyllin wasted: 400 mg    Resting EKG:  Normal sinus rhythm, old anterior and inferior myocardial  infarction. Arrhythmias: Occasional PVCs. EKG changes:  No ischemic ECG changes seen. INTERPRETATION:  Treadmill stress was switched to Sammie Prow as the  patient was short of breath and not able to exercise. Abnormal baseline  ECG, no ischemic ECG changes seen with injection.     Nuclear Myocardial Perfusion Imaging (SPECT)    TESTING METHOD  STRESS:   Lexiscan  AGENT:    Cardiolite  REST:          Injection Date:  10/08/2020  Time:  0850  Amount:  14.2  mCi  STRESS:   Injection Date:  10/08/2020  Time:  1018  Amount:  39.3 mCi  IMAGE TIME:    Rest:  0915  Stress:  1130    EF:  15%  TID:  0.94  LHR:  0.42    FINDINGS:  Ischemia (Reversible Defect):

## 2020-10-09 ENCOUNTER — TELEPHONE (OUTPATIENT)
Dept: CARDIOLOGY | Age: 65
End: 2020-10-09

## 2020-10-09 NOTE — TELEPHONE ENCOUNTER
Dr. Dalia Callejas office called and said the pt had an abnormal EKG and  wanted him seen before his December appt.  lvm to schedule

## 2020-10-09 NOTE — TELEPHONE ENCOUNTER
This was not an ekg, it was a stress and echo which were abnormal and it was documented yesterday that we notified him and he has had a cath ordered.

## 2020-10-13 ENCOUNTER — APPOINTMENT (OUTPATIENT)
Dept: GENERAL RADIOLOGY | Age: 65
End: 2020-10-13
Payer: MEDICARE

## 2020-10-13 ENCOUNTER — HOSPITAL ENCOUNTER (INPATIENT)
Age: 65
LOS: 3 days | Discharge: HOME | DRG: 287 | End: 2020-10-16
Attending: EMERGENCY MEDICINE | Admitting: INTERNAL MEDICINE
Payer: MEDICARE

## 2020-10-13 ENCOUNTER — HOSPITAL ENCOUNTER (EMERGENCY)
Age: 65
Discharge: ANOTHER HOSPITAL | End: 2020-10-13
Attending: EMERGENCY MEDICINE
Payer: MEDICARE

## 2020-10-13 VITALS
OXYGEN SATURATION: 97 % | HEART RATE: 85 BPM | HEIGHT: 74 IN | RESPIRATION RATE: 17 BRPM | TEMPERATURE: 97.6 F | SYSTOLIC BLOOD PRESSURE: 103 MMHG | WEIGHT: 210 LBS | DIASTOLIC BLOOD PRESSURE: 80 MMHG | BODY MASS INDEX: 26.95 KG/M2

## 2020-10-13 PROBLEM — J30.2 SEASONAL ALLERGIES: Status: ACTIVE | Noted: 2020-10-13

## 2020-10-13 PROBLEM — R07.9 CHEST PAIN: Status: ACTIVE | Noted: 2020-10-13

## 2020-10-13 PROBLEM — N17.9 AKI (ACUTE KIDNEY INJURY) (HCC): Status: ACTIVE | Noted: 2020-10-13

## 2020-10-13 PROBLEM — R94.39 ABNORMAL STRESS TEST: Status: ACTIVE | Noted: 2020-10-13

## 2020-10-13 PROBLEM — J90 BILATERAL PLEURAL EFFUSION: Status: ACTIVE | Noted: 2020-10-13

## 2020-10-13 PROBLEM — I50.9 ACUTE HEART FAILURE (HCC): Status: ACTIVE | Noted: 2020-10-13

## 2020-10-13 LAB
ABSOLUTE EOS #: 0.16 K/UL (ref 0–0.44)
ABSOLUTE IMMATURE GRANULOCYTE: 0.05 K/UL (ref 0–0.3)
ABSOLUTE LYMPH #: 1.18 K/UL (ref 1.1–3.7)
ABSOLUTE MONO #: 0.89 K/UL (ref 0.1–1.2)
ANION GAP SERPL CALCULATED.3IONS-SCNC: 12 MMOL/L (ref 9–17)
BASOPHILS # BLD: 1 % (ref 0–2)
BASOPHILS ABSOLUTE: 0.05 K/UL (ref 0–0.2)
BNP INTERPRETATION: ABNORMAL
BUN BLDV-MCNC: 28 MG/DL (ref 8–23)
BUN/CREAT BLD: 22 (ref 9–20)
CALCIUM SERPL-MCNC: 9.8 MG/DL (ref 8.6–10.4)
CHLORIDE BLD-SCNC: 102 MMOL/L (ref 98–107)
CO2: 22 MMOL/L (ref 20–31)
CREAT SERPL-MCNC: 1.28 MG/DL (ref 0.7–1.2)
DIFFERENTIAL TYPE: ABNORMAL
EOSINOPHILS RELATIVE PERCENT: 2 % (ref 1–4)
GFR AFRICAN AMERICAN: >60 ML/MIN
GFR NON-AFRICAN AMERICAN: 57 ML/MIN
GFR SERPL CREATININE-BSD FRML MDRD: ABNORMAL ML/MIN/{1.73_M2}
GFR SERPL CREATININE-BSD FRML MDRD: ABNORMAL ML/MIN/{1.73_M2}
GLUCOSE BLD-MCNC: 111 MG/DL (ref 70–99)
HCT VFR BLD CALC: 42.2 % (ref 40.7–50.3)
HEMOGLOBIN: 13.9 G/DL (ref 13–17)
IMMATURE GRANULOCYTES: 1 %
LYMPHOCYTES # BLD: 12 % (ref 24–43)
MCH RBC QN AUTO: 28.9 PG (ref 25.2–33.5)
MCHC RBC AUTO-ENTMCNC: 32.9 G/DL (ref 25.2–33.5)
MCV RBC AUTO: 87.7 FL (ref 82.6–102.9)
MONOCYTES # BLD: 9 % (ref 3–12)
NRBC AUTOMATED: 0 PER 100 WBC
PDW BLD-RTO: 12.7 % (ref 11.8–14.4)
PLATELET # BLD: 237 K/UL (ref 138–453)
PLATELET ESTIMATE: ABNORMAL
PMV BLD AUTO: 10.6 FL (ref 8.1–13.5)
POTASSIUM SERPL-SCNC: 4.5 MMOL/L (ref 3.7–5.3)
PRO-BNP: 7339 PG/ML
RBC # BLD: 4.81 M/UL (ref 4.21–5.77)
RBC # BLD: ABNORMAL 10*6/UL
SARS-COV-2, RAPID: NOT DETECTED
SARS-COV-2: NORMAL
SARS-COV-2: NORMAL
SEG NEUTROPHILS: 75 % (ref 36–65)
SEGMENTED NEUTROPHILS ABSOLUTE COUNT: 7.64 K/UL (ref 1.5–8.1)
SODIUM BLD-SCNC: 136 MMOL/L (ref 135–144)
SOURCE: NORMAL
TROPONIN INTERP: ABNORMAL
TROPONIN INTERP: NORMAL
TROPONIN INTERP: NORMAL
TROPONIN T: ABNORMAL NG/ML
TROPONIN T: NORMAL NG/ML
TROPONIN T: NORMAL NG/ML
TROPONIN, HIGH SENSITIVITY: 17 NG/L (ref 0–22)
TROPONIN, HIGH SENSITIVITY: 21 NG/L (ref 0–22)
TROPONIN, HIGH SENSITIVITY: 23 NG/L (ref 0–22)
WBC # BLD: 10 K/UL (ref 3.5–11.3)
WBC # BLD: ABNORMAL 10*3/UL

## 2020-10-13 PROCEDURE — 80048 BASIC METABOLIC PNL TOTAL CA: CPT

## 2020-10-13 PROCEDURE — 93005 ELECTROCARDIOGRAM TRACING: CPT | Performed by: STUDENT IN AN ORGANIZED HEALTH CARE EDUCATION/TRAINING PROGRAM

## 2020-10-13 PROCEDURE — 2060000000 HC ICU INTERMEDIATE R&B

## 2020-10-13 PROCEDURE — 6360000002 HC RX W HCPCS: Performed by: EMERGENCY MEDICINE

## 2020-10-13 PROCEDURE — 71045 X-RAY EXAM CHEST 1 VIEW: CPT

## 2020-10-13 PROCEDURE — 99285 EMERGENCY DEPT VISIT HI MDM: CPT

## 2020-10-13 PROCEDURE — 6370000000 HC RX 637 (ALT 250 FOR IP): Performed by: EMERGENCY MEDICINE

## 2020-10-13 PROCEDURE — 83880 ASSAY OF NATRIURETIC PEPTIDE: CPT

## 2020-10-13 PROCEDURE — 93005 ELECTROCARDIOGRAM TRACING: CPT | Performed by: EMERGENCY MEDICINE

## 2020-10-13 PROCEDURE — 96374 THER/PROPH/DIAG INJ IV PUSH: CPT

## 2020-10-13 PROCEDURE — 6360000002 HC RX W HCPCS: Performed by: STUDENT IN AN ORGANIZED HEALTH CARE EDUCATION/TRAINING PROGRAM

## 2020-10-13 PROCEDURE — 85025 COMPLETE CBC W/AUTO DIFF WBC: CPT

## 2020-10-13 PROCEDURE — 99223 1ST HOSP IP/OBS HIGH 75: CPT | Performed by: NURSE PRACTITIONER

## 2020-10-13 PROCEDURE — 84484 ASSAY OF TROPONIN QUANT: CPT

## 2020-10-13 PROCEDURE — U0002 COVID-19 LAB TEST NON-CDC: HCPCS

## 2020-10-13 PROCEDURE — 6370000000 HC RX 637 (ALT 250 FOR IP): Performed by: NURSE PRACTITIONER

## 2020-10-13 RX ORDER — POTASSIUM CHLORIDE 20 MEQ/1
40 TABLET, EXTENDED RELEASE ORAL PRN
Status: DISCONTINUED | OUTPATIENT
Start: 2020-10-13 | End: 2020-10-16 | Stop reason: HOSPADM

## 2020-10-13 RX ORDER — ONDANSETRON 2 MG/ML
4 INJECTION INTRAMUSCULAR; INTRAVENOUS EVERY 6 HOURS PRN
Status: DISCONTINUED | OUTPATIENT
Start: 2020-10-13 | End: 2020-10-16 | Stop reason: HOSPADM

## 2020-10-13 RX ORDER — FAMOTIDINE 20 MG/1
20 TABLET, FILM COATED ORAL DAILY
Status: DISCONTINUED | OUTPATIENT
Start: 2020-10-14 | End: 2020-10-16 | Stop reason: HOSPADM

## 2020-10-13 RX ORDER — POTASSIUM CHLORIDE 7.45 MG/ML
10 INJECTION INTRAVENOUS PRN
Status: DISCONTINUED | OUTPATIENT
Start: 2020-10-13 | End: 2020-10-16 | Stop reason: HOSPADM

## 2020-10-13 RX ORDER — ASPIRIN 81 MG/1
81 TABLET ORAL DAILY
Status: DISCONTINUED | OUTPATIENT
Start: 2020-10-13 | End: 2020-10-16 | Stop reason: HOSPADM

## 2020-10-13 RX ORDER — NITROGLYCERIN 0.4 MG/1
0.4 TABLET SUBLINGUAL EVERY 5 MIN PRN
Status: DISCONTINUED | OUTPATIENT
Start: 2020-10-13 | End: 2020-10-16 | Stop reason: HOSPADM

## 2020-10-13 RX ORDER — SODIUM CHLORIDE 0.9 % (FLUSH) 0.9 %
10 SYRINGE (ML) INJECTION PRN
Status: DISCONTINUED | OUTPATIENT
Start: 2020-10-13 | End: 2020-10-16 | Stop reason: HOSPADM

## 2020-10-13 RX ORDER — FUROSEMIDE 10 MG/ML
20 INJECTION INTRAMUSCULAR; INTRAVENOUS 2 TIMES DAILY
Status: DISCONTINUED | OUTPATIENT
Start: 2020-10-13 | End: 2020-10-14

## 2020-10-13 RX ORDER — ACETAMINOPHEN 325 MG/1
650 TABLET ORAL EVERY 6 HOURS PRN
Status: DISCONTINUED | OUTPATIENT
Start: 2020-10-13 | End: 2020-10-16 | Stop reason: HOSPADM

## 2020-10-13 RX ORDER — ASPIRIN 81 MG/1
324 TABLET, CHEWABLE ORAL ONCE
Status: COMPLETED | OUTPATIENT
Start: 2020-10-13 | End: 2020-10-13

## 2020-10-13 RX ORDER — PROMETHAZINE HYDROCHLORIDE 12.5 MG/1
12.5 TABLET ORAL EVERY 6 HOURS PRN
Status: DISCONTINUED | OUTPATIENT
Start: 2020-10-13 | End: 2020-10-16 | Stop reason: HOSPADM

## 2020-10-13 RX ORDER — MAGNESIUM SULFATE 1 G/100ML
1 INJECTION INTRAVENOUS PRN
Status: DISCONTINUED | OUTPATIENT
Start: 2020-10-13 | End: 2020-10-16 | Stop reason: HOSPADM

## 2020-10-13 RX ORDER — FUROSEMIDE 10 MG/ML
20 INJECTION INTRAMUSCULAR; INTRAVENOUS ONCE
Status: COMPLETED | OUTPATIENT
Start: 2020-10-13 | End: 2020-10-13

## 2020-10-13 RX ORDER — ATORVASTATIN CALCIUM 80 MG/1
80 TABLET, FILM COATED ORAL NIGHTLY
Status: DISCONTINUED | OUTPATIENT
Start: 2020-10-13 | End: 2020-10-16 | Stop reason: HOSPADM

## 2020-10-13 RX ORDER — ATORVASTATIN CALCIUM 80 MG/1
40 TABLET, FILM COATED ORAL NIGHTLY
Status: DISCONTINUED | OUTPATIENT
Start: 2020-10-13 | End: 2020-10-13

## 2020-10-13 RX ORDER — METOPROLOL TARTRATE 50 MG/1
25 TABLET, FILM COATED ORAL 2 TIMES DAILY
Status: DISCONTINUED | OUTPATIENT
Start: 2020-10-13 | End: 2020-10-14

## 2020-10-13 RX ORDER — SODIUM CHLORIDE 0.9 % (FLUSH) 0.9 %
10 SYRINGE (ML) INJECTION EVERY 12 HOURS SCHEDULED
Status: DISCONTINUED | OUTPATIENT
Start: 2020-10-13 | End: 2020-10-15

## 2020-10-13 RX ORDER — ACETAMINOPHEN 650 MG/1
650 SUPPOSITORY RECTAL EVERY 6 HOURS PRN
Status: DISCONTINUED | OUTPATIENT
Start: 2020-10-13 | End: 2020-10-16 | Stop reason: HOSPADM

## 2020-10-13 RX ADMIN — FUROSEMIDE 20 MG: 10 INJECTION, SOLUTION INTRAMUSCULAR; INTRAVENOUS at 07:02

## 2020-10-13 RX ADMIN — ATORVASTATIN CALCIUM 80 MG: 80 TABLET, FILM COATED ORAL at 21:07

## 2020-10-13 RX ADMIN — FUROSEMIDE 20 MG: 10 INJECTION, SOLUTION INTRAMUSCULAR; INTRAVENOUS at 21:04

## 2020-10-13 RX ADMIN — ASPIRIN 324 MG: 81 TABLET, CHEWABLE ORAL at 06:04

## 2020-10-13 ASSESSMENT — ENCOUNTER SYMPTOMS
DIARRHEA: 0
SINUS PAIN: 0
BACK PAIN: 0
SHORTNESS OF BREATH: 1
NAUSEA: 0
ABDOMINAL DISTENTION: 0
CONSTIPATION: 0
SINUS PRESSURE: 0
COUGH: 1
SORE THROAT: 0
VOMITING: 0
RHINORRHEA: 1
ABDOMINAL PAIN: 0

## 2020-10-13 NOTE — ED PROVIDER NOTES
[facility] ED  Emergency Department Encounter  EmergencyMedicine Resident     Pt Name:Javier Berger  MRN: [id number]  [place] 1955  Date of evaluation: 10/13/20  PCP:  Jose Alfredo Jo MD    CHIEF COMPLAINT       Chief Complaint   Patient presents with    Shortness of Breath     Pt transferred from [place], scheduled for heart cath tomorrow, last two nights severe SOB that makes pt anxious       HISTORY OF PRESENT ILLNESS  (Location/Symptom, Timing/Onset, Context/Setting, Quality, Duration, Modifying Factors, Severity.)      Phyllis Huertas is a 59 y.o. male who presents with SOB. Patient has a history of hyperlipidemia, recent history of shortness of breath, being evaluated by cardiology, is supposed to receive cardiac catheterization tomorrow, but presented to [place] with worsening shortness of breath. EKG showed Q waves, no ST or T wave changes, troponin 21, BNP 7000, EF 20%, transferred to [facility] for cardiology evaluation, accepted by [facility] for admission. Patient was administered aspirin and Lasix prior to transfer. Patient is currently asymptomatic, but does report recent shortness of breath, denying chest pain. Patient denies fevers, chills, cough, congestion, lightheadedness, dizziness, visual changes, hearing changes, chest pain, abdominal pain, nausea, vomiting, dysuria, hematuria, diarrhea, constipation, melena, hematochezia, lower extremity pain or swelling, rash, allergies. PAST MEDICAL / SURGICAL / SOCIAL / FAMILY HISTORY      has a past medical history of Adenocarcinoma ([facility] [address] and Hyperlipidemia. has a past surgical history that includes Colonoscopy (10/27/2010) and Mouth surgery (01/2007).       Social History     Socioeconomic History    Marital status:      Spouse name: Not on file    Number of children: Not on file    Years of education: Not on file    Highest education level: Not on file   Occupational History    Not on file   Social Needs    Financial resource strain: Not on file    Food insecurity     Worry: Not on file     Inability: Not on file    Transportation needs     Medical: Not on file     Non-medical: Not on file   Tobacco Use    Smoking status: Never Smoker    Smokeless tobacco: Never Used   Substance and Sexual Activity    Alcohol use: No    Drug use: No    Sexual activity: Not on file   Lifestyle    Physical activity     Days per week: Not on file     Minutes per session: Not on file    Stress: Not on file   Relationships    Social connections     Talks on phone: Not on file     Gets together: Not on file     Attends Faith service: Not on file     Active member of club or organization: Not on file     Attends meetings of clubs or organizations: Not on file     Relationship status: Not on file    Intimate partner violence     Fear of current or ex partner: Not on file     Emotionally abused: Not on file     Physically abused: Not on file     Forced sexual activity: Not on file   Other Topics Concern    Not on file   Social History Narrative    Not on file       Family History   Problem Relation Age of Onset    Heart Attack Father         Myocardial infarction in his 76s.  Diabetes Father     Glaucoma Mother        Allergies:  Penicillins    Home Medications:  Prior to Admission medications    Medication Sig Start Date End Date Taking? Authorizing Provider   Loratadine (CLARITIN PO) Take 1 tablet by mouth 2 times daily as needed    Historical Provider, MD       REVIEW OF SYSTEMS    (2-9 systems for level 4, 10 or more for level 5)      Review of Systems   Positive for some shortness of breath. Patient is currently asymptomatic, but does report recent shortness of breath, denying chest pain.   Patient denies fevers, chills, cough, congestion, lightheadedness, dizziness, visual changes, hearing changes, chest pain, abdominal pain, nausea, vomiting, dysuria, hematuria, diarrhea, constipation, for Exam: SOB, chest pain Acuity: Acute Type of Exam: Subsequent/Follow-up FINDINGS: The heart is normal in size and configuration. The mediastinal contours are within normal limits. Bibasilar multifocal ill-defined airspace disease is noted. Mild blunting of bilateral costophrenic angles is seen. Bones and soft tissues are unremarkable. 1. Bilateral lower lung multifocal ill-defined airspace disease. 2. Suspected mild bilateral pleural effusions. EKG  EKG Interpretation    Interpreted by me    Rhythm: normal sinus   Rate: normal  Axis: normal  Ectopy: none  Conduction: normal  ST Segments: no acute change  T Waves: no acute change  Q Waves: none    Clinical Impression: no acute changes and normal EKG    All EKG's are interpreted by the Emergency Department Physician who either signs or Co-signs this chart in the absence of a cardiologist.    EMERGENCY DEPARTMENT COURSE:  Patient came to emergency department, HPI and physical exam were conducted. All nursing notes were reviewed. Patient remained stable in the emergency department, asymptomatic, awaiting transfer to the floor. PROCEDURES:      CONSULTS:  IP CONSULT TO CARDIOLOGY  IP CONSULT TO HOSPITALIST  IP CONSULT TO CARDIOLOGY    CRITICAL CARE:  Please see attending note    FINAL IMPRESSION      1. Congestive heart failure, unspecified HF chronicity, unspecified heart failure type ([address]          DISPOSITION / PLAN     DISPOSITION Admitted 10/13/2020 02:18:40 PM      PATIENT REFERRED TO:  No follow-up provider specified.     DISCHARGE MEDICATIONS:  New Prescriptions    No medications on file       Melo Galdamez MD  Emergency Medicine Resident    (Please note that portions of thisnote were completed with a voice recognition program.  Efforts were made to edit the dictations but occasionally words are mis-transcribed.)       Melo Galdamez MD  Resident  10/13/20 [id number]

## 2020-10-13 NOTE — ED PROVIDER NOTES
eMERGENCY dEPARTMENT eNCOUnter      Pt Name: Agnes Duran  MRN: [id number]  [place] 1955  Date of evaluation: 10/13/2020      CHIEF COMPLAINT       Chief Complaint   Patient presents with    Shortness of Breath         HISTORY OF PRESENT ILLNESS    Agnes Duran is a 59 y.o. male who presents with shortness of breath. Patient states he had a stress echo several days ago on the eighth found out that he had an abnormal stress and was scheduled for cardiac catheterization tomorrow, states over last several days has been having increasing difficulty sleeping, increasing shortness of breath. Last night was worse even when he was sitting up in a, reclining are descended, in for evaluation . No chest pain, no fever, chills. He states he feels better here. He is on 2 L of oxygen        REVIEW OF SYSTEMS       Review of systems are all reviewed and negative except stated above in HPI     [address]    has a past medical history of Adenocarcinoma ([facility] [address] and Hyperlipidemia. SURGICAL HISTORY      has a past surgical history that includes Colonoscopy (10/27/2010) and Mouth surgery (01/2007). CURRENT MEDICATIONS       Previous Medications    LORATADINE (CLARITIN PO)    Take 1 tablet by mouth 2 times daily as needed       ALLERGIES     is allergic to penicillins. FAMILY HISTORY     He indicated that the status of his mother is unknown. He indicated that the status of his father is unknown.     family history includes Diabetes in his father; Glaucoma in his mother; Heart Attack in his father. SOCIAL HISTORY      reports that he has never smoked. He has never used smokeless tobacco. He reports that he does not drink alcohol or use drugs. PHYSICAL EXAM     INITIAL VITALS:  height is 6' 2\" (1.88 m) and weight is 210 lb (95.3 kg). His tympanic temperature is 97.6 °F (36.4 °C). His blood pressure is 116/88 and his pulse is 94. His respiration is 16 and oxygen saturation is 97%. Gen.: Patient is a nontoxic-appearing male in no apparent distress. HEENT: Head is atraumatic. Mouth shows moist mucous membranes. Neck: Supple. Respiratory: Lung sounds show a few crackles in the left base. No wheezes are diminished breath sounds. Cardiac: Heart is regular rate and rhythm. GI: Abdomen soft, nontender, good active bowel sounds. Peripheral exam no cyanosis, edema, good distal pulses. Neuro: No gross focal neurological deficits at bedside exam    DIFFERENTIAL DIAGNOSIS/ MDM:     Congestive heart failure, acute coronary syndrome    DIAGNOSTIC RESULTS     EKG: All EKG's are interpreted by the Emergency Department Physician who either signs or Co-signs this chart in the absence of a cardiologist.    .  EKG interpreted by me, reveals sinus rhythm at a rate of 95. An occasional PVC. He has Q waves in his inferior anteroseptal leads, consistent with old MIs, left axis deviation. Normal DE interval, no mucous complex    RADIOLOGY:   I directly visualized the following  images and reviewed the radiologist interpretations:  XR CHEST PORTABLE   Final Result   1. Bilateral lower lung multifocal ill-defined airspace disease. 2. Suspected mild bilateral pleural effusions.                LABS:  Labs Reviewed   CBC WITH AUTO DIFFERENTIAL - Abnormal; Notable for the following components:       Result Value    Seg Neutrophils 75 (*)     Lymphocytes 12 (*)     Immature Granulocytes 1 (*)     All other components within normal limits   BASIC METABOLIC PANEL - Abnormal; Notable for the following components:    Glucose 111 (*)     BUN 28 (*)     CREATININE 1.28 (*)     Bun/Cre Ratio 22 (*)     GFR Non- 57 (*)     All other components within normal limits   BRAIN NATRIURETIC PEPTIDE - Abnormal; Notable for the following components:    Pro-BNP 7,339 (*)     All other components within normal limits   TROPONIN         EMERGENCY DEPARTMENT COURSE:   Vitals:    Vitals:    10/13/20 0545 10/13/20 0600 10/13/20 0630 10/13/20 0700   BP: 139/82 (!) 124/95 114/88 116/88   Pulse:  95 95 94   Resp:  16 16 16   Temp:       TempSrc:       SpO2:  94% 92% 97%   Weight:       Height:         -------------------------  BP: 116/88, Temp: 97.6 °F (36.4 °C), Pulse: 94, Resp: 16    Orders Placed This Encounter   Medications    aspirin chewable tablet 324 mg    furosemide (LASIX) injection 20 mg           Re-evaluation Notes    Troponin is normal.  Chest x-ray shows failure. ProBNP is elevated at this time based on his history. His stress echo, which showed only 20% ejection fraction. I do feel he needs to get cardiac catheterization done today. I am attempting to contact cardiology. At this time, care we turned over to oncoming physician, Dr. Isrrael Ramirez, secondary to the ER. My shift for final disposition and diagnosis    CRITICAL CARE:   None      CONSULTS:      PROCEDURES:  None    FINAL IMPRESSION    No diagnosis found. DISPOSITION/PLAN   DISPOSITION        Condition on Disposition        PATIENT REFERRED TO:  No follow-up provider specified. DISCHARGE MEDICATIONS:  New Prescriptions    No medications on file       (Please note that portions of this note were completed with a voice recognition program.  Efforts were made to edit the dictations but occasionally words are mis-transcribed.)    Buen[place] MD, F.A.C.E.P.   Attending Emergency Physician        Chris Aguilar MD  10/13/20 [id number]

## 2020-10-13 NOTE — ED PROVIDER NOTES
[facility]  Emergency Department  Faculty Attestation     I performed a history and physical examination of the patient and discussed management with the resident. I reviewed the residents note and agree with the documented findings and plan of care. Any areas of disagreement are noted on the chart. I was personally present for the key portions of any procedures. I have documented in the chart those procedures where I was not present during the key portions. I have reviewed the emergency nurses triage note. I agree with the chief complaint, past medical history, past surgical history, allergies, medications, social and family history as documented unless otherwise noted below. For Physician Assistant/ Nurse Practitioner cases/documentation I have personally evaluated this patient and have completed at least one if not all key elements of the E/M (history, physical exam, and MDM). Additional findings are as noted. Primary Care Physician:  Tennille Yi MD    Screenings:  [unfilled]    CHIEF COMPLAINT       Chief Complaint   Patient presents with    Shortness of Breath     Pt transferred from [place], scheduled for heart cath tomorrow, last two nights severe SOB that makes pt anxious       RECENT VITALS:   Temp: 98 °F (36.7 °C),  Pulse: 89, Resp: 16, BP: 107/76    LABS:  Labs Reviewed - No data to display    Radiology  No orders to display       EKG:   EKG Interpretation    Interpreted by me    Rhythm: normal sinus   Rate: normal  Axis: Left  Ectopy: none  Conduction: normal  ST Segments: no acute change  T Waves: Inversion in aVL and V2  Q Waves: Anteroseptal, and inferior  Poor R wave progression  Low voltage    Clinical Impression: 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 Outpatient work-up shows EF of 20%.   Unable to do exercise stress test. EKG shows old anterior/inferior MI/Q waves. Scheduled for cardiac cath tomorrow. Transferred from outside hospital for CHF exacerbation with elevated BNP, CHF on chest x-ray, given Lasix and is diuresing. Had a short run of nonsustained ventricular tachycardia in route without symptoms. No chest pain sweats nausea palpitations. No peripheral edema. No cardiac history. Recent Covid exposure but no symptoms other than sinusitis and some shortness of breath. No fevers. On exam he appears comfortable vital signs are normal.  Breath sounds are diminished. No JVD or gallop. No peripheral edema cords Homans or calf tenderness. Impression is CHF, cardiomyopathy, nonsustained ventricular tachycardia, sinusitis, Covid exposure. Plan is cardiology consultation, Covid assay, admission. Blanca Flynn.  Tasha Gandhi MD, [address]  Attending Emergency  Physician               Arik Charles MD  10/13/20 9652

## 2020-10-13 NOTE — H&P
prompting ED evaluation at [place] and was sent to [address] [place] [address] for further evaluation and work-up. Cardiology evaluated the patient in the emergency department and we were asked to admit the patient for further work-up and management. Cardiac cath planned for tomorrow. N.p.o. at midnight    On assessment patient is awake and alert sitting in bed on low-flow O2. He has no past significant medical history with the exception of seasonal allergies for which he takes a allergy medication over-the-counter intermittently. He currently denies any chest pain, nausea, vomiting, diarrhea, constipation, fever, chills, urinary symptoms or pain. He does not report any significant weight gain over the last few weeks he reports no significant family history of cardiac issues or sudden cardiac death. He does not smoke and he does not drink alcohol. Past Medical History:     Past Medical History:   Diagnosis Date    Adenocarcinoma ([address]     Lip.  Hyperlipidemia         Past Surgical History:     Past Surgical History:   Procedure Laterality Date    COLONOSCOPY  10/27/2010    Showed multiple internal hemorrhoids.  MOUTH SURGERY  01/2007    Lip surgery. Medications Prior to Admission:     Prior to Admission medications    Medication Sig Start Date End Date Taking? Authorizing Provider   Loratadine (CLARITIN PO) Take 1 tablet by mouth 2 times daily as needed    Historical Provider, MD        Allergies:     Penicillins    Social History:     Tobacco:    reports that he has never smoked. He has never used smokeless tobacco.  Alcohol:      reports no history of alcohol use. Drug Use:  reports no history of drug use. Family History:     Family History   Problem Relation Age of Onset    Heart Attack Father         Myocardial infarction in his 76s.  Diabetes Father     Glaucoma Mother        Review of Systems:     Positive and Negative as described in HPI.     Review of Systems - 3.70 k/uL    Absolute Mono # 0.89 0.10 - 1.20 k/uL    Absolute Eos # 0.16 0.00 - 0.44 k/uL    Basophils Absolute 0.05 0.00 - 0.20 k/uL    Absolute Immature Granulocyte 0.05 0.00 - 0.30 k/uL    WBC Morphology NOT REPORTED     RBC Morphology NOT REPORTED     Platelet Estimate NOT REPORTED    Basic Metabolic Panel    Collection Time: 10/13/20  6:12 AM   Result Value Ref Range    Glucose 111 (H) 70 - 99 mg/dL    BUN 28 (H) 8 - 23 mg/dL    CREATININE 1.28 (H) 0.70 - 1.20 mg/dL    Bun/Cre Ratio 22 (H) 9 - 20    Calcium 9.8 8.6 - 10.4 mg/dL    Sodium 136 135 - 144 mmol/L    Potassium 4.5 3.7 - 5.3 mmol/L    Chloride 102 98 - 107 mmol/L    CO2 22 20 - 31 mmol/L    Anion Gap 12 9 - 17 mmol/L    GFR Non-African American 57 (L) >60 mL/min    GFR African American >60 >60 mL/min    GFR Comment          GFR Staging NOT REPORTED    TROPONIN    Collection Time: 10/13/20  6:12 AM   Result Value Ref Range    Troponin, High Sensitivity 21 0 - 22 ng/L    Troponin T NOT REPORTED <0.03 ng/mL    Troponin Interp NOT REPORTED    Brain Natriuretic Peptide    Collection Time: 10/13/20  6:12 AM   Result Value Ref Range    Pro-BNP 7,339 (H) <300 pg/mL    BNP Interpretation Pro-BNP Reference Range:    COVID-19    Collection Time: 10/13/20 12:42 PM    Specimen: Other   Result Value Ref Range    SARS-CoV-2          SARS-CoV-2, Rapid Not Detected Not Detected    Source . NASOPHARYNGEAL SWAB     SARS-CoV-2             Imaging/Diagnostics:  Xr Chest Portable    Result Date: 10/13/2020  1. Bilateral lower lung multifocal ill-defined airspace disease. 2. Suspected mild bilateral pleural effusions. Assessment :      Hospital Problems           Last Modified POA    Abnormal stress test 10/13/2020 Yes    CECILIA (acute kidney injury) ([facility] [address] 10/13/2020 Yes    Bilateral pleural effusion 10/13/2020 Yes    Chest pain 10/13/2020 Yes    Seasonal allergies 10/13/2020 Yes          Plan:     Patient status inpatient in the Med/Surge with telemetry    1.  Abnormal stress test: patient was previously scheduled for cardiac cath after abnormal stress test in the outpatient setting on 10/14. Unfortunately patient developed worsening shortness of breath prompting ED evaluation at [place] and was sent to [facility] for further evaluation and work-up. 2. Bilateral pleural effusion: Lasix 20 mg IV twice daily with caution with CECILIA  3. Acute kidney injury: Hold off on IV fluid resuscitation at this time due to bilateral pleural effusions and diuresis. 4. Seasonal allergies on over-the-counter meds  5. N.p.o. after midnight for cardiac cath  6. CODE STATUS: Full code discussed with patient and his wife  7. PT/OT post cath    Consultations:   IP CONSULT TO CARDIOLOGY  IP CONSULT TO HOSPITALIST  IP CONSULT TO CARDIOLOGY    Patient is admitted as inpatient status because of co-morbidities listed above, severity of signs and symptoms as outlined, requirement for current medical therapies and most importantly because of direct risk to patient if care not provided in a hospital setting. Expected length of stay > 48 hours.     ANGY Chaparro NP  10/13/2020  3:33 PM    Copy sent to Dr. Abel Steven MD

## 2020-10-13 NOTE — PROGRESS NOTES
[facility]  Documentation Note                Admission Dx: Chest pain [R07.9]    Past Medical History:   has a past medical history of Adenocarcinoma ([address] and Hyperlipidemia. Previous Testing:     ECHO 10/8/2020: EF 20%, grade I DD, mild TR, RVSP is 50 mmHg, moderate PHTN. STRESS 10/8/2020: Large inferior and inferoapical infarction. Minimal faiza-infarct ischemia. EF 15%. Previous office/hospital visit:   Dr. Rachel Acosta 10/2/2020:   Dyspnea on exertion. Will plan for Treadmill Cardiolite stress and echo.      Latoya MedranoFormerly [facility]

## 2020-10-13 NOTE — ED PROVIDER NOTES
FACULTY SIGN-OUT  ADDENDUM     Care of this patient was assumed from previous attending physician. The patient's initial evaluation and plan have been discussed with the prior provider who initially evaluated the patient. Attestation  I was available and discussed any additional care issues that arose and coordinated the management plans with the resident(s) caring for the patient during my duty period. Any areas of disagreement with resident's documentation of care or procedures are noted on the chart. I was personally present for the key portions of any/all procedures, during my duty period. I have documented in the chart those procedures where I was not present during the key portions. ED COURSE      The patient was given the following medications:  Orders Placed This Encounter   Medications    aspirin EC tablet 81 mg    atorvastatin (LIPITOR) tablet 40 mg    furosemide (LASIX) injection 20 mg       RECENT VITALS:   Temp: 98 °F (36.7 °C), Pulse: 74, Resp: 16, BP: 100/75    MEDICAL DECISION MAKING        Rosita Chavarria is a 59 y.o. male who presents to the Emergency Department with complaints of CHF exacerbation. Tx from [place]. [place]-.  Maria Luz Lemons MD  Attending Emergency Physician    (Please note that portions of this note were completed with a voice recognition program.  Efforts were made to edit the dictations but occasionally words are mis-transcribed.)          Stanford House MD  10/13/20 7134

## 2020-10-13 NOTE — ED NOTES
Bed: 23  Expected date:   Expected time:   Means of arrival:   Comments:  shawandaiance     Hans Sims RN  10/13/20 3417

## 2020-10-13 NOTE — ED NOTES
Patient states that he still feels short of breath.   Patient placed on 2L supplemental O2 via [address]  10/13/20 1934

## 2020-10-13 NOTE — ED PROVIDER NOTES
[facility] ED  [address]  Phone: [phone]        ADDENDUM:      Care of this patient was assumed from Dr. Leigha Ly. The patient was seen for Shortness of Breath  . The patient's initial evaluation and plan have been discussed with the prior provider who initially evaluated the patient. Nursing Notes, Past Medical Hx, Past Surgical Hx, Allergies, were all reviewed. PAST MEDICAL HISTORY    has a past medical history of Adenocarcinoma ([address] and Hyperlipidemia. SURGICAL HISTORY      has a past surgical history that includes Colonoscopy (10/27/2010) and Mouth surgery (01/2007). CURRENT MEDICATIONS       Previous Medications    LORATADINE (CLARITIN PO)    Take 1 tablet by mouth 2 times daily as needed       ALLERGIES     is allergic to penicillins.       Diagnostic Results         LABS:   Results for orders placed or performed during the hospital encounter of 10/13/20   CBC Auto Differential   Result Value Ref Range    WBC 10.0 3.5 - 11.3 k/uL    RBC 4.81 4.21 - 5.77 m/uL    Hemoglobin 13.9 13.0 - 17.0 g/dL    Hematocrit 42.2 40.7 - 50.3 %    MCV 87.7 82.6 - 102.9 fL    MCH 28.9 25.2 - 33.5 pg    MCHC 32.9 25.2 - 33.5 g/dL    RDW 12.7 11.8 - 14.4 %    Platelets 547 133 - 329 k/uL    MPV 10.6 8.1 - 13.5 fL    NRBC Automated 0.0 0.0 per 100 WBC    Differential Type NOT REPORTED     Seg Neutrophils 75 (H) 36 - 65 %    Lymphocytes 12 (L) 24 - 43 %    Monocytes 9 3 - 12 %    Eosinophils % 2 1 - 4 %    Basophils 1 0 - 2 %    Immature Granulocytes 1 (H) 0 %    Segs Absolute 7.64 1.50 - 8.10 k/uL    Absolute Lymph # 1.18 1.10 - 3.70 k/uL    Absolute Mono # 0.89 0.10 - 1.20 k/uL    Absolute Eos # 0.16 0.00 - 0.44 k/uL    Basophils Absolute 0.05 0.00 - 0.20 k/uL    Absolute Immature Granulocyte 0.05 0.00 - 0.30 k/uL    WBC Morphology NOT REPORTED     RBC Morphology NOT REPORTED     Platelet Estimate NOT REPORTED    Basic Metabolic Panel   Result Value Ref Range DEPARTMENT COURSE:   Vitals:    Vitals:    10/13/20 0630 10/13/20 0700 10/13/20 0730 10/13/20 0800   BP: 114/88 116/88 (!) 120/90 (!) 132/92   Pulse: 95 94 93 97   Resp: 16 16 18 16   Temp:       TempSrc:       SpO2: 92% 97% 96% 97%   Weight:       Height:         -------------------------  BP: (!) 132/92, Temp: 97.6 °F (36.4 °C), Pulse: 97, Resp: 16      RE-EVALUATION:  Improved    CONSULTS:  Cardiology    PROCEDURES:  None    FINAL IMPRESSION      1. Acute decompensated heart failure ([facility] [address]          DISPOSITION/PLAN   DISPOSITION Decision To Transfer 10/13/2020 08:12:03 AM      CONDITION ON DISPOSITION:   Stable    PATIENT REFERRED TO:  No follow-up provider specified. DISCHARGE MEDICATIONS:  New Prescriptions    No medications on file       (Please note that portions of this note were completed with a voicerecognition program.  Efforts were made to edit the dictations but occasionally words are mis-transcribed.)    Elizondo MD, F.A.C.E.P.   Attending Emergency Medicine Physician                     Warden Carrillo, [address] [place] [address]  10/13/20 2162

## 2020-10-13 NOTE — ED TRIAGE NOTES
Patient presents to ED from [address] [place] [address] with cc of shortness of breath over the course of the last several nights. Per the patient, he is supposed to have a cardiac cath tomorrow due to an abnormal echo and stress test.  He was noted to have an EF of approximately 20%. He states that the last several nights he has been waking up and \"gasping for air\" at times. He is alert and oriented x4 on arrival.  Patient placed on cardiac monitor and IV established. Dr. Saeed Hopping to room to evaluate.

## 2020-10-14 ENCOUNTER — HOSPITAL ENCOUNTER (OUTPATIENT)
Dept: CARDIAC CATH/INVASIVE PROCEDURES | Age: 65
Discharge: HOME | End: 2020-10-14
Payer: COMMERCIAL

## 2020-10-14 ENCOUNTER — APPOINTMENT (OUTPATIENT)
Dept: CARDIAC CATH/INVASIVE PROCEDURES | Age: 65
DRG: 287 | End: 2020-10-14
Payer: MEDICARE

## 2020-10-14 PROBLEM — I42.8 NONISCHEMIC CARDIOMYOPATHY (HCC): Status: ACTIVE | Noted: 2020-10-14

## 2020-10-14 LAB
ABSOLUTE EOS #: 0.14 K/UL (ref 0–0.44)
ABSOLUTE IMMATURE GRANULOCYTE: <0.03 K/UL (ref 0–0.3)
ABSOLUTE LYMPH #: 1.2 K/UL (ref 1.1–3.7)
ABSOLUTE MONO #: 0.95 K/UL (ref 0.1–1.2)
ALBUMIN SERPL-MCNC: 3.8 G/DL (ref 3.5–5.2)
ALBUMIN/GLOBULIN RATIO: 1.2 (ref 1–2.5)
ALP BLD-CCNC: 73 U/L (ref 40–129)
ALT SERPL-CCNC: 12 U/L (ref 5–41)
ANION GAP SERPL CALCULATED.3IONS-SCNC: 13 MMOL/L (ref 9–17)
AST SERPL-CCNC: 14 U/L
BASOPHILS # BLD: 1 % (ref 0–2)
BASOPHILS ABSOLUTE: 0.04 K/UL (ref 0–0.2)
BILIRUB SERPL-MCNC: 0.93 MG/DL (ref 0.3–1.2)
BNP INTERPRETATION: ABNORMAL
BUN BLDV-MCNC: 30 MG/DL (ref 8–23)
BUN/CREAT BLD: ABNORMAL (ref 9–20)
CALCIUM SERPL-MCNC: 9.1 MG/DL (ref 8.6–10.4)
CHLORIDE BLD-SCNC: 103 MMOL/L (ref 98–107)
CO2: 22 MMOL/L (ref 20–31)
CREAT SERPL-MCNC: 1.05 MG/DL (ref 0.7–1.2)
DIFFERENTIAL TYPE: ABNORMAL
EKG ATRIAL RATE: 87 BPM
EKG P AXIS: 55 DEGREES
EKG P-R INTERVAL: 196 MS
EKG Q-T INTERVAL: 386 MS
EKG QRS DURATION: 88 MS
EKG QTC CALCULATION (BAZETT): 464 MS
EKG R AXIS: -71 DEGREES
EKG T AXIS: 58 DEGREES
EKG VENTRICULAR RATE: 87 BPM
EOSINOPHILS RELATIVE PERCENT: 2 % (ref 1–4)
GFR AFRICAN AMERICAN: >60 ML/MIN
GFR NON-AFRICAN AMERICAN: >60 ML/MIN
GFR SERPL CREATININE-BSD FRML MDRD: ABNORMAL ML/MIN/{1.73_M2}
GFR SERPL CREATININE-BSD FRML MDRD: ABNORMAL ML/MIN/{1.73_M2}
GLUCOSE BLD-MCNC: 93 MG/DL (ref 70–99)
HCT VFR BLD CALC: 44.2 % (ref 40.7–50.3)
HCT VFR BLD CALC: 46.4 % (ref 40.7–50.3)
HEMOGLOBIN: 14.3 G/DL (ref 13–17)
HEMOGLOBIN: 14.9 G/DL (ref 13–17)
IMMATURE GRANULOCYTES: 0 %
INR BLD: 1
LYMPHOCYTES # BLD: 16 % (ref 24–43)
MAGNESIUM: 2.2 MG/DL (ref 1.6–2.6)
MCH RBC QN AUTO: 29.1 PG (ref 25.2–33.5)
MCH RBC QN AUTO: 29.1 PG (ref 25.2–33.5)
MCHC RBC AUTO-ENTMCNC: 32.1 G/DL (ref 28.4–34.8)
MCHC RBC AUTO-ENTMCNC: 32.4 G/DL (ref 28.4–34.8)
MCV RBC AUTO: 89.8 FL (ref 82.6–102.9)
MCV RBC AUTO: 90.6 FL (ref 82.6–102.9)
MONOCYTES # BLD: 13 % (ref 3–12)
NRBC AUTOMATED: 0 PER 100 WBC
NRBC AUTOMATED: 0 PER 100 WBC
PARTIAL THROMBOPLASTIN TIME: 25.5 SEC (ref 20.5–30.5)
PDW BLD-RTO: 12.4 % (ref 11.8–14.4)
PDW BLD-RTO: 12.6 % (ref 11.8–14.4)
PLATELET # BLD: 249 K/UL (ref 138–453)
PLATELET # BLD: 255 K/UL (ref 138–453)
PLATELET ESTIMATE: ABNORMAL
PMV BLD AUTO: 10.6 FL (ref 8.1–13.5)
PMV BLD AUTO: 10.9 FL (ref 8.1–13.5)
POTASSIUM SERPL-SCNC: 5 MMOL/L (ref 3.7–5.3)
PRO-BNP: 3720 PG/ML
PROTHROMBIN TIME: 10.5 SEC (ref 9–12)
RBC # BLD: 4.92 M/UL (ref 4.21–5.77)
RBC # BLD: 5.12 M/UL (ref 4.21–5.77)
RBC # BLD: ABNORMAL 10*6/UL
SEG NEUTROPHILS: 68 % (ref 36–65)
SEGMENTED NEUTROPHILS ABSOLUTE COUNT: 5.07 K/UL (ref 1.5–8.1)
SODIUM BLD-SCNC: 138 MMOL/L (ref 135–144)
TOTAL PROTEIN: 6.9 G/DL (ref 6.4–8.3)
TROPONIN INTERP: ABNORMAL
TROPONIN T: ABNORMAL NG/ML
TROPONIN, HIGH SENSITIVITY: 24 NG/L (ref 0–22)
WBC # BLD: 7.4 K/UL (ref 3.5–11.3)
WBC # BLD: 8.9 K/UL (ref 3.5–11.3)
WBC # BLD: ABNORMAL 10*3/UL

## 2020-10-14 PROCEDURE — C1769 GUIDE WIRE: HCPCS

## 2020-10-14 PROCEDURE — 93308 TTE F-UP OR LMTD: CPT

## 2020-10-14 PROCEDURE — 6360000002 HC RX W HCPCS: Performed by: STUDENT IN AN ORGANIZED HEALTH CARE EDUCATION/TRAINING PROGRAM

## 2020-10-14 PROCEDURE — 2580000003 HC RX 258: Performed by: STUDENT IN AN ORGANIZED HEALTH CARE EDUCATION/TRAINING PROGRAM

## 2020-10-14 PROCEDURE — 83735 ASSAY OF MAGNESIUM: CPT

## 2020-10-14 PROCEDURE — 6370000000 HC RX 637 (ALT 250 FOR IP): Performed by: STUDENT IN AN ORGANIZED HEALTH CARE EDUCATION/TRAINING PROGRAM

## 2020-10-14 PROCEDURE — 36415 COLL VENOUS BLD VENIPUNCTURE: CPT

## 2020-10-14 PROCEDURE — 6360000004 HC RX CONTRAST MEDICATION

## 2020-10-14 PROCEDURE — 2580000003 HC RX 258: Performed by: INTERNAL MEDICINE

## 2020-10-14 PROCEDURE — 83880 ASSAY OF NATRIURETIC PEPTIDE: CPT

## 2020-10-14 PROCEDURE — 85027 COMPLETE CBC AUTOMATED: CPT

## 2020-10-14 PROCEDURE — B2151ZZ FLUOROSCOPY OF LEFT HEART USING LOW OSMOLAR CONTRAST: ICD-10-PCS | Performed by: INTERNAL MEDICINE

## 2020-10-14 PROCEDURE — 4A023N8 MEASUREMENT OF CARDIAC SAMPLING AND PRESSURE, BILATERAL, PERCUTANEOUS APPROACH: ICD-10-PCS | Performed by: INTERNAL MEDICINE

## 2020-10-14 PROCEDURE — APPSS180 APP SPLIT SHARED TIME > 60 MINUTES: Performed by: NURSE PRACTITIONER

## 2020-10-14 PROCEDURE — 6360000002 HC RX W HCPCS

## 2020-10-14 PROCEDURE — 93010 ELECTROCARDIOGRAM REPORT: CPT | Performed by: INTERNAL MEDICINE

## 2020-10-14 PROCEDURE — 2500000003 HC RX 250 WO HCPCS

## 2020-10-14 PROCEDURE — 99232 SBSQ HOSP IP/OBS MODERATE 35: CPT | Performed by: INTERNAL MEDICINE

## 2020-10-14 PROCEDURE — 80053 COMPREHEN METABOLIC PANEL: CPT

## 2020-10-14 PROCEDURE — B2111ZZ FLUOROSCOPY OF MULTIPLE CORONARY ARTERIES USING LOW OSMOLAR CONTRAST: ICD-10-PCS | Performed by: INTERNAL MEDICINE

## 2020-10-14 PROCEDURE — 2060000000 HC ICU INTERMEDIATE R&B

## 2020-10-14 PROCEDURE — 85730 THROMBOPLASTIN TIME PARTIAL: CPT

## 2020-10-14 PROCEDURE — 2709999900 HC NON-CHARGEABLE SUPPLY

## 2020-10-14 PROCEDURE — 93458 L HRT ARTERY/VENTRICLE ANGIO: CPT | Performed by: INTERNAL MEDICINE

## 2020-10-14 PROCEDURE — 85610 PROTHROMBIN TIME: CPT

## 2020-10-14 PROCEDURE — 85025 COMPLETE CBC W/AUTO DIFF WBC: CPT

## 2020-10-14 PROCEDURE — C1894 INTRO/SHEATH, NON-LASER: HCPCS

## 2020-10-14 RX ORDER — HEPARIN SODIUM 1000 [USP'U]/ML
40 INJECTION, SOLUTION INTRAVENOUS; SUBCUTANEOUS PRN
Status: DISCONTINUED | OUTPATIENT
Start: 2020-10-14 | End: 2020-10-16 | Stop reason: HOSPADM

## 2020-10-14 RX ORDER — FUROSEMIDE 10 MG/ML
40 INJECTION INTRAMUSCULAR; INTRAVENOUS 2 TIMES DAILY
Status: DISCONTINUED | OUTPATIENT
Start: 2020-10-14 | End: 2020-10-16

## 2020-10-14 RX ORDER — METOPROLOL SUCCINATE 25 MG/1
25 TABLET, EXTENDED RELEASE ORAL DAILY
Status: DISCONTINUED | OUTPATIENT
Start: 2020-10-15 | End: 2020-10-16

## 2020-10-14 RX ORDER — HEPARIN SODIUM 1000 [USP'U]/ML
80 INJECTION, SOLUTION INTRAVENOUS; SUBCUTANEOUS ONCE
Status: COMPLETED | OUTPATIENT
Start: 2020-10-14 | End: 2020-10-14

## 2020-10-14 RX ORDER — SODIUM CHLORIDE 0.9 % (FLUSH) 0.9 %
10 SYRINGE (ML) INJECTION PRN
Status: DISCONTINUED | OUTPATIENT
Start: 2020-10-14 | End: 2020-10-16 | Stop reason: HOSPADM

## 2020-10-14 RX ORDER — SODIUM CHLORIDE 9 MG/ML
INJECTION, SOLUTION INTRAVENOUS CONTINUOUS
Status: DISCONTINUED | OUTPATIENT
Start: 2020-10-14 | End: 2020-10-14

## 2020-10-14 RX ORDER — SODIUM CHLORIDE 0.9 % (FLUSH) 0.9 %
10 SYRINGE (ML) INJECTION EVERY 12 HOURS SCHEDULED
Status: DISCONTINUED | OUTPATIENT
Start: 2020-10-14 | End: 2020-10-15

## 2020-10-14 RX ORDER — HEPARIN SODIUM 1000 [USP'U]/ML
80 INJECTION, SOLUTION INTRAVENOUS; SUBCUTANEOUS PRN
Status: DISCONTINUED | OUTPATIENT
Start: 2020-10-14 | End: 2020-10-16 | Stop reason: HOSPADM

## 2020-10-14 RX ORDER — HEPARIN SODIUM 10000 [USP'U]/100ML
18 INJECTION, SOLUTION INTRAVENOUS CONTINUOUS
Status: DISCONTINUED | OUTPATIENT
Start: 2020-10-14 | End: 2020-10-16 | Stop reason: HOSPADM

## 2020-10-14 RX ORDER — ACETAMINOPHEN 325 MG/1
650 TABLET ORAL EVERY 4 HOURS PRN
Status: DISCONTINUED | OUTPATIENT
Start: 2020-10-14 | End: 2020-10-16 | Stop reason: HOSPADM

## 2020-10-14 RX ORDER — WARFARIN SODIUM 5 MG/1
5 TABLET ORAL DAILY
Status: DISCONTINUED | OUTPATIENT
Start: 2020-10-14 | End: 2020-10-16 | Stop reason: DRUGHIGH

## 2020-10-14 RX ADMIN — Medication 10 ML: at 15:37

## 2020-10-14 RX ADMIN — HEPARIN SODIUM 7620 UNITS: 1000 INJECTION INTRAVENOUS; SUBCUTANEOUS at 20:30

## 2020-10-14 RX ADMIN — FUROSEMIDE 40 MG: 10 INJECTION, SOLUTION INTRAMUSCULAR; INTRAVENOUS at 18:04

## 2020-10-14 RX ADMIN — PERFLUTREN 1.3 MG: 6.52 INJECTION, SUSPENSION INTRAVENOUS at 14:49

## 2020-10-14 RX ADMIN — FAMOTIDINE 20 MG: 20 TABLET, FILM COATED ORAL at 15:37

## 2020-10-14 RX ADMIN — WARFARIN SODIUM 5 MG: 5 TABLET ORAL at 18:38

## 2020-10-14 RX ADMIN — SODIUM CHLORIDE: 9 INJECTION, SOLUTION INTRAVENOUS at 08:33

## 2020-10-14 RX ADMIN — Medication 81 MG: at 15:37

## 2020-10-14 RX ADMIN — SODIUM CHLORIDE, PRESERVATIVE FREE 10 ML: 5 INJECTION INTRAVENOUS at 20:34

## 2020-10-14 RX ADMIN — Medication 10 ML: at 18:05

## 2020-10-14 RX ADMIN — ATORVASTATIN CALCIUM 80 MG: 80 TABLET, FILM COATED ORAL at 20:31

## 2020-10-14 RX ADMIN — HEPARIN SODIUM AND DEXTROSE 18 UNITS/KG/HR: 10000; 5 INJECTION INTRAVENOUS at 20:30

## 2020-10-14 ASSESSMENT — ENCOUNTER SYMPTOMS
SINUS PAIN: 0
VOMITING: 0
COUGH: 1
ABDOMINAL PAIN: 0
SORE THROAT: 0
SHORTNESS OF BREATH: 1
RHINORRHEA: 1
DIARRHEA: 0
NAUSEA: 0
SINUS PRESSURE: 0
ABDOMINAL DISTENTION: 0
CONSTIPATION: 0

## 2020-10-14 NOTE — PROGRESS NOTES
[facility]   Progress Note                    Date:   10/14/2020  Patient name:  Rosita Chavarria  Date of admission:  10/13/2020 11:09 AM  MRN:   [id number]  YOB: 1955  PCP:    Kimberly Corley MD    Reason for Admission:  Chest pain [R07.9]    Subjective:       Clinical Changes / Abnormalities: The patient was seen and examined . I/O last 3 completed shifts:  In: -   Out: 300 [Urine:300]  I/O this shift:  In: -   Out: 250 [Urine:250]      In: -   Out: 550 [Urine:550]      Intake/Output Summary (Last 24 hours) at 10/14/2020 1357  Last data filed at 10/14/2020 1300  Gross per 24 hour   Intake --   Output 550 ml   Net -550 ml         I/O since admission:  liters    Medications:   Scheduled Meds:   furosemide  40 mg Intravenous BID    sodium chloride flush  10 mL Intravenous 2 times per day    [START ON 10/15/2020] metoprolol succinate  25 mg Oral Daily    [START ON 10/15/2020] sacubitril-valsartan  1 tablet Oral BID    aspirin  81 mg Oral Daily    sodium chloride flush  10 mL Intravenous 2 times per day    famotidine  20 mg Oral Daily    atorvastatin  80 mg Oral Nightly    enoxaparin  40 mg Subcutaneous Daily     Continuous Infusions:  CBC:   Recent Labs     10/13/20  0612 10/14/20  1156   WBC 10.0 7.4   HGB 13.9 14.3    249     BMP:    Recent Labs     10/13/20  0612 10/14/20  1156    138   K 4.5 5.0    103   CO2 22 22   BUN 28* 30*   CREATININE 1.28* 1.05   GLUCOSE 111* 93     Hepatic:   Recent Labs     10/14/20  1156   AST 14   ALT 12   BILITOT 0.93   ALKPHOS 73     Troponin:  Recent Labs     10/13/20  1616 10/13/20  2115 10/14/20  0107   TROPHS 23* 17 24*            No results for input(s): TROPONINI in the last 72 hours.           Recent Labs     10/13/20  1616 10/13/20  2115 10/14/20  0107   TROPONINT NOT REPORTED NOT REPORTED NOT REPORTED     BNP:   Recent Labs     10/13/20  0612   PROBNP 7,339*               No results for input(s): BNP in the last 72 hours. Lipids: No results for input(s): CHOL, HDL in the last 72 hours. Invalid input(s): LDLCALCU  INR: No results for input(s): INR in the last 72 hours. Objective:   Vitals: BP 99/70   Pulse 82   Temp 96.6 °F (35.9 °C) (Oral)   Resp 8   Ht 6' 2\" (1.88 m)   Wt 210 lb (95.3 kg)   SpO2 98%   BMI 26.96 kg/m²      PHYSICAL EXAM:      /69   Pulse 75   Temp 97.2 °F (36.2 °C) (Oral)   Resp 16   Ht 6' 2\" (1.88 m)   Wt 210 lb (95.3 kg)   SpO2 95%   BMI 26.96 kg/m²    Constitutional and General Appearance: Alert, no distress  Respiratory:  · No increased work of breathing. · Clear to auscultation bilaterally. No wheeze or crackles. Cardiovascular:  · Normal S1 and S2.   · Jugular venous pulsation Normal  Abdomen:   · Soft  · No tenderness  Extremities:  · No lower extremity edema  Neurologic:  · Alert and oriented. · Moves all extremities well       Diagnostic Studies:        EKG: SR, anterolateral and inferior infarct     ECHO 10/8/2020: EF 20%, grade I DD, mild TR, RVSP is 50 mmHg, moderate PHTN.      STRESS 10/8/2020: Large inferior and inferoapical infarction. Minimal faiza-infarct ischemia. EF 15%.          Cardiac cath: 10/14/20  LMCA: Mild irregularities 10-20%.     LAD: Mild irregularities 10-20%.     LCx: Mild irregularities 10-20%.     RCA: Mild irregularities 10-20%.     Coronary Tree      Dominance: Right     LV Analysis  LV function assessed as:Abnormal.  Ejection Fraction: 15%  Patient's Active Problem List   Principal Problem:    Acute heart failure (HCC)  Active Problems:    ENAMORADO (dyspnea on exertion)    Abnormal stress test    CECILIA (acute kidney injury) (HCC)    Bilateral pleural effusion    Seasonal allergies  Resolved Problems:    * No resolved hospital problems. *        Assessment / Acute Cardiac Problems:   1. Non ischemic cardiomyopathy with EF 15%  2. Concern for LV thrombus on LV gram - Definity echo pending  3. Elevated LVEDP  4.  CECILIA         Plan of Treatment:   1. Changed Metoprolol to XL 25 mg daily, uptitrate as tolerated  2. Increased Lasix to 40 mg IV bid due to elevated LVEDP  3. Limited echo with contrast ordered to rule out LV thrombus  4. Strict I/Os, daily weights  5. Ordered Entresto 24-26 starting tomorrow,   6. Will need Lifevest before discharge        Elle Ferrell MD  Fellow, [id number] [facility]          Attending note,   The patient was seen and examined, agree with above, IV diuresis (LVEDP 28 mm Hg), Close follow on BMP, I/O and chart. Will titrate treatment for cardiomyopathy, obtain echo with Definity for possible clot on LV apex. Will need Life vest as outpatient. (LVEF of 15%).

## 2020-10-14 NOTE — PROGRESS NOTES
Physical Therapy  DATE: 10/14/2020    NAME: Taylor Rubio  MRN: [id number]   : 1955    Patient not seen this date for Physical Therapy due to:  [] Blood transfusion in progress  [] Hemodialysis  []  Patient Declined  [] Spine Precautions   [x] Strict Bedrest: post cath. [] Surgery/ Procedure  [] Testing      [x] Other: cardiac cath. Ck 10/15        [] PT being discontinued at this time. Patient independent. No further needs. [] PT being discontinued at this time as the patient has been transferred to palliative care. No further needs.     Weir Carlos, PT

## 2020-10-14 NOTE — CARE COORDINATION
Order for [address] face sheet, order, and cath report to [facility]. Talked with Trey. Still need progress note and echo report. 1330 faxed echo report from 10-8-2020  1405 faxed progress note to [phone] Faxed Echo report from today to [facility].  [facility] on 10-

## 2020-10-14 NOTE — PROGRESS NOTES
Occupational Therapy    Occupational Therapy Not Seen Note    DATE: 10/14/2020  Name: Kina Aguiar  : 1955  MRN: [id number]    Patient not available for Occupational Therapy due to:    Surgery/Procedure: At heart cath per RN, hold OT eval.    Next Scheduled Treatment: Attempt on 10/15 as appropriate.     Electronically signed by Brice Roberts OT on 10/14/2020 at 9:15 AM

## 2020-10-14 NOTE — CONSULTS
[facility]   Consultation Note               Today's Date: 10/14/2020  Patient Name: Chanel Sullivan  Date of admission: 10/13/2020 11:09 AM  Patient's age: 59 y.o., 1955  Admission Dx: Chest pain [R07.9]    Reason for Consult:  Dyspnea on exertion    Requesting Physician: Alecia Gibbs DO    CHIEF COMPLAINT:    Chief Complaint   Patient presents with    Shortness of Breath     Pt transferred from [address] [place] [address] [address], scheduled for heart cath tomorrow, last two nights severe SOB that makes pt anxious       History Obtained From:  patient, electronic medical record    HISTORY OF PRESENT ILLNESS:      The patient is a 59 y.o.  male who was transferred from [address] [address] after presenting with progressive shortness of breath on exertion. He was recently evaluated by cardiology and underwent a stress test and an echo with a new diagnosis of HFrEF. No prior hx of ACS/CHF/PCI. No hx of smoking, DM, HTN. Patient was supposed to have outpatient cardiac cath today. At OSH he received IV diuresis after which his symptoms improved. Past Medical History:   has a past medical history of Abnormal cardiovascular stress test, Adenocarcinoma ([facility] [address] ENAMORADO (dyspnea on exertion), H/O echocardiogram, and Hyperlipidemia. Past Surgical History:   has a past surgical history that includes Colonoscopy (10/27/2010); Mouth surgery (01/2007); and Cardiac catheterization (10/14/2020). Home Medications:    Prior to Admission medications    Medication Sig Start Date End Date Taking?  Authorizing Provider   Loratadine (CLARITIN PO) Take 1 tablet by mouth 2 times daily as needed    Historical Provider, MD      Current Facility-Administered Medications: 0.9 % sodium chloride infusion, , Intravenous, Continuous  aspirin EC tablet 81 mg, 81 mg, Oral, Daily  furosemide (LASIX) injection 20 mg, 20 mg, Intravenous, BID  sodium chloride flush 0.9 % injection 10 mL, 10 mL, Intravenous, 2 times per day  sodium chloride flush 0.9 % injection 10 mL, 10 mL, Intravenous, PRN  potassium chloride (KLOR-CON M) extended release tablet 40 mEq, 40 mEq, Oral, PRN **OR** potassium bicarb-citric acid (EFFER-K) effervescent tablet 40 mEq, 40 mEq, Oral, PRN **OR** potassium chloride 10 mEq/100 mL IVPB (Peripheral Line), 10 mEq, Intravenous, PRN  magnesium sulfate 1 g in dextrose 5% 100 mL IVPB, 1 g, Intravenous, PRN  acetaminophen (TYLENOL) tablet 650 mg, 650 mg, Oral, Q6H PRN **OR** acetaminophen (TYLENOL) suppository 650 mg, 650 mg, Rectal, Q6H PRN  magnesium hydroxide (MILK OF MAGNESIA) 400 MG/5ML suspension 30 mL, 30 mL, Oral, Daily PRN  promethazine (PHENERGAN) tablet 12.5 mg, 12.5 mg, Oral, Q6H PRN **OR** ondansetron (ZOFRAN) injection 4 mg, 4 mg, Intravenous, Q6H PRN  famotidine (PEPCID) tablet 20 mg, 20 mg, Oral, Daily  atorvastatin (LIPITOR) tablet 80 mg, 80 mg, Oral, Nightly  metoprolol tartrate (LOPRESSOR) tablet 25 mg, 25 mg, Oral, BID  enoxaparin (LOVENOX) injection 40 mg, 40 mg, Subcutaneous, Daily  nitroGLYCERIN (NITROSTAT) SL tablet 0.4 mg, 0.4 mg, Sublingual, Q5 Min PRN  aspirin EC tablet 325 mg, 325 mg, Oral, Daily      Allergies:  Penicillins      Social History:   reports that he has never smoked. He has never used smokeless tobacco. He reports that he does not drink alcohol or use drugs. Family History: family history includes Diabetes in his father; Glaucoma in his mother; Heart Attack in his father. No h/o sudden cardiac death. No for premature CAD      REVIEW OF SYSTEMS:    · Constitutional: there has been no unanticipated weight loss. · Eyes: No visual changes or diplopia. · ENT: No Headaches  · Cardiovascular: see above  · Respiratory: No previous pulmonary problems, No cough  · Gastrointestinal: No abdominal pain. No change in bowel or bladder habits. · Genitourinary: No dysuria, trouble voiding, or hematuria.   · Musculoskeletal:  No gait disturbance, No weakness or joint complaints. · Integumentary: No rash or pruritis. · Neurological: No headache, diplopia      PHYSICAL EXAM:      /69   Pulse 75   Temp 97.2 °F (36.2 °C) (Oral)   Resp 16   Ht 6' 2\" (1.88 m)   Wt 210 lb (95.3 kg)   SpO2 95%   BMI 26.96 kg/m²    Constitutional and General Appearance: Alert, no distress  Respiratory:  · No increased work of breathing. · Clear to auscultation bilaterally. No wheeze or crackles. Cardiovascular:  · Normal S1 and S2.   · Jugular venous pulsation Normal  Abdomen:   · Soft  · No tenderness  Extremities:  · No lower extremity edema  Neurologic:  · Alert and oriented. · Moves all extremities well      DATA:    Diagnostics:    Labs:     CBC:   Recent Labs     10/13/20  0612   WBC 10.0   HGB 13.9   HCT 42.2        BMP:   Recent Labs     10/13/20  0612      K 4.5   CO2 22   BUN 28*   CREATININE 1.28*   LABGLOM 57*   GLUCOSE 111*     CARDIAC ENZYMES:  Recent Labs     10/13/20  1616 10/13/20  2115 10/14/20  0107   TROPHS 23* 17 24*       Recent Labs     10/13/20  1616 10/13/20  2115 10/14/20  0107   TROPONINT NOT REPORTED NOT REPORTED NOT REPORTED     FASTING LIPID PANEL:  Lab Results   Component Value Date    HDL 42 09/21/2020    TRIG 145 09/21/2020     LIVER PROFILE:No results for input(s): AST, ALT, LABALBU in the last 72 hours. EKG: SR, anterolateral and inferior infarct    ECHO 10/8/2020: EF 20%, grade I DD, mild TR, RVSP is 50 mmHg, moderate PHTN.      STRESS 10/8/2020: Large inferior and inferoapical infarction. Minimal faiza-infarct ischemia. EF 15%. IMPRESSION:    1. Dyspnea on exertion 2/2 to CHF  2. Abnormal stress test  3. HFrEF 20%  4. CM  5. CECILIA      RECOMMENDATIONS:  1. Keep NPO. Plan for cardiac cath today if renal function is stable. 2. Continue asa 81 mg and Lipitor 80 mg.   3. Hold BB and ACEI for now given low pressures. Will add gradually. Thank you for allowing us to participate in [facility].  Will follow with

## 2020-10-14 NOTE — PROGRESS NOTES
[facility]  Office: [address], DO, Kyara Mary Jo, DO, Ernestine Umana, DO, Damian Dengstephanie Blood, DO, Gurjit Yost MD, Charlene De Guzman MD, Chrissy Magdaleno MD, Socorro Abreu MD, Erik Montenegro MD, Jarrod Smith MD, Malathi Lea MD, Juany Florentino MD, Vadim Simmons MD, Suraj Wetzel, DO, Rossi Paredes MD, Natanael Manzano MD, David Wright DO, Avery De La Rosa MD,  Britton Pearson DO, Suhas Raman MD, Hayder Sheriff MD, Jessie Ozuna, [facility], [facility], CNP, Sonia Puri, CNP, Alka Andrew, CNS, Nacho Pastmorgan, CNP, Lana Singh, CNP, Zuri Clinton, CNP, Alexandrea Peres, CNP, Deborah Garcia, CNP, Gabriel Mcfadden PA-C, Terence Padilla, [facility], Bennett Rivera, CNP, Daylin Carver, CNP, Cristobal Cox, CNP, Ashley Barraza, CNP, Milo Ocampo, [address] [place] [address]    Progress Note    10/14/2020    4:44 PM    Name:   Gerald Culver  MRN:     [id number]     Acct:      [de-identified]   Room:   2017/2017-01  IP Day:  1  Admit Date:  10/13/2020 11:09 AM    PCP:   Samantha Welch MD  Code Status:  Full Code    Subjective:     C/C:   Chief Complaint   Patient presents with    Shortness of Breath     Pt transferred from [facility], scheduled for heart cath tomorrow, last two nights severe SOB that makes pt anxious     Interval History Status: not changed. Patient seen and evaluated in cardiac Cath Lab prior to cardiac cath earlier this morning. He states that he slept better and is not experiencing as much shortness of breath as he did over the last few days but is still requiring low-flow nasal cannula O2. Brief History:     Per records:    Gerald Culver is a 59 y.o.  Non-/non  male who presents with Shortness of Breath (Pt transferred from [facility], scheduled for heart cath tomorrow, last two nights severe SOB that makes pt anxious)   and is admitted to the hospital for the management of <principal problem not specified>.     patient was previously scheduled for cardiac cath after abnormal stress test in the outpatient setting on 10/14. Unfortunately patient developed worsening shortness of breath prompting ED evaluation at [place] and was sent to [facility] for further evaluation and work-up. Cardiology evaluated the patient in the emergency department and we were asked to admit the patient for further work-up and management. Cardiac cath planned for tomorrow. N.p.o. at midnight     Cardiac cath not demonstrating ischemic etiology  Severely reduced ejection fraction and possible LV thrombus identified  Follow-up echo pending  Needs LifeVest prior to discharge      Review of Systems:     Review of Systems   Constitutional: Negative for activity change, appetite change, chills and fever. HENT: Positive for postnasal drip and rhinorrhea. Negative for congestion, sinus pressure, sinus pain and sore throat. Eyes: Negative for visual disturbance. Respiratory: Positive for cough and shortness of breath. Orthopnea     Cardiovascular: Negative for chest pain, palpitations and leg swelling. Gastrointestinal: Negative for abdominal distention, abdominal pain, constipation, diarrhea, nausea and vomiting. Genitourinary: Positive for frequency. Negative for difficulty urinating and urgency. Musculoskeletal: Negative for arthralgias and myalgias. Skin: Negative for rash and wound. Neurological: Negative for dizziness, weakness and headaches. Hematological: Negative for adenopathy. Psychiatric/Behavioral: Negative for confusion. Medications: Allergies:     Allergies   Allergen Reactions    Penicillins Other (See Comments)     As a child- unsure of reaction       Current Meds:   Scheduled Meds:    furosemide  40 mg Intravenous BID    sodium chloride flush  10 mL Intravenous 2 times per day    [START ON 10/15/2020] metoprolol succinate  25 mg Oral Daily    [START ON 10/15/2020] sacubitril-valsartan  1 tablet Oral BID    aspirin  81 mg Oral Daily    sodium chloride flush  10 mL Intravenous 2 times per day    famotidine  20 mg Oral Daily    atorvastatin  80 mg Oral Nightly    enoxaparin  40 mg Subcutaneous Daily     Continuous Infusions:     PRN Meds: sodium chloride flush, acetaminophen, sodium chloride flush, potassium chloride **OR** potassium alternative oral replacement **OR** potassium chloride, magnesium sulfate, acetaminophen **OR** acetaminophen, magnesium hydroxide, promethazine **OR** ondansetron, nitroGLYCERIN    Data:     Past Medical History:   has a past medical history of Abnormal cardiovascular stress test, Adenocarcinoma (HCC), ENAMORADO (dyspnea on exertion), H/O echocardiogram, and Hyperlipidemia. Social History:   reports that he has never smoked. He has never used smokeless tobacco. He reports that he does not drink alcohol or use drugs. Family History:   Family History   Problem Relation Age of Onset    Heart Attack Father         Myocardial infarction in his 76s.  Diabetes Father     Glaucoma Mother        Vitals:  BP 99/70   Pulse 76   Temp 96.6 °F (35.9 °C) (Oral)   Resp 13   Ht 6' 2\" (1.88 m)   Wt 210 lb (95.3 kg)   SpO2 99%   BMI 26.96 kg/m²   Temp (24hrs), Av.2 °F (36.2 °C), Min:96.6 °F (35.9 °C), Max:97.7 °F (36.5 °C)    No results for input(s): POCGLU in the last 72 hours. I/O (24Hr):     Intake/Output Summary (Last 24 hours) at 10/14/2020 1644  Last data filed at 10/14/2020 1300  Gross per 24 hour   Intake --   Output 550 ml   Net -550 ml       Labs:  Hematology:  Recent Labs     10/13/20  0612 10/14/20  1156   WBC 10.0 7.4   RBC 4.81 4.92   HGB 13.9 14.3   HCT 42.2 44.2   MCV 87.7 89.8   MCH 28.9 29.1   MCHC 32.9 32.4   RDW 12.7 12.6    249   MPV 10.6 10.6     Chemistry:  Recent Labs     10/13/20  0612 10/13/20  1616 10/13/20  2115 10/14/20  0107 10/14/20  1156     --   --   --  138   K 4.5  --   --   --  5.0   --   --   --  103   CO2 22  --   --   --  22   GLUCOSE 111*  --   --   --  93   BUN 28*  --   --   --  30*   CREATININE 1.28*  --   --   --  1.05   MG  --   --   --   --  2.2   ANIONGAP 12  --   --   --  13   LABGLOM 57*  --   --   --  >60   GFRAA >60  --   --   --  >60   CALCIUM 9.8  --   --   --  9.1   PROBNP 7,339*  --   --   --   --    TROPHS 21 23* 17 24*  --      Recent Labs     10/14/20  1156   PROT 6.9   LABALBU 3.8   AST 14   ALT 12   ALKPHOS 73   BILITOT 0.93     ABG:No results found for: POCPH, PHART, PH, POCPCO2, FID7XNA, PCO2, POCPO2, PO2ART, PO2, POCHCO3, NKK4XYL, HCO3, NBEA, PBEA, BEART, BE, THGBART, THB, CVE0IXB, OOAJ7SVP, P9URAKXC, O2SAT, FIO2  No results found for: SPECIAL  No results found for: CULTURE    Radiology:  Xr Chest Portable    Result Date: 10/13/2020  1. Bilateral lower lung multifocal ill-defined airspace disease. 2. Suspected mild bilateral pleural effusions. Physical Examination:        Physical Exam  Vitals signs and nursing note reviewed. Constitutional:       Appearance: Normal appearance. HENT:      Head: Normocephalic and atraumatic. Mouth/Throat:      Mouth: Mucous membranes are moist.   Eyes:      Extraocular Movements: Extraocular movements intact. Pupils: Pupils are equal, round, and reactive to light. Neck:      Musculoskeletal: Normal range of motion and neck supple. Vascular: No carotid bruit. Cardiovascular:      Rate and Rhythm: Normal rate and regular rhythm. Pulses: Normal pulses. Heart sounds: Normal heart sounds. No murmur. Pulmonary:      Effort: Pulmonary effort is normal.      Comments: 2 L LFNC, Left posterior crackles not clearing with coughing   Abdominal:      General: Bowel sounds are normal. There is no distension. Palpations: Abdomen is soft. Tenderness: There is no abdominal tenderness. There is no guarding. Musculoskeletal: Normal range of motion. Right lower leg: No edema.       Left lower leg: No edema. Lymphadenopathy:      Cervical: No cervical adenopathy. Skin:     General: Skin is warm and dry. Capillary Refill: Capillary refill takes less than 2 seconds. Neurological:      General: No focal deficit present. Mental Status: He is alert and oriented to person, place, and time. Psychiatric:         Mood and Affect: Mood normal.         Behavior: Behavior normal.         Assessment:        Hospital Problems           Last Modified POA    * (Principal) Acute heart failure ([facility] [address] 10/13/2020 Yes    ENAMORADO (dyspnea on exertion) 10/13/2020 Yes    Abnormal stress test 10/13/2020 Yes    CECILIA (acute kidney injury) ([facility] [address] 10/13/2020 Yes    Bilateral pleural effusion 10/13/2020 Yes    Seasonal allergies 10/13/2020 Yes    Nonischemic cardiomyopathy ([facility] [address] 10/14/2020 Yes          Plan:        1. Abnormal stress test: patient was previously scheduled for cardiac cath after abnormal stress test in the outpatient setting on 10/14. Unfortunately patient developed worsening shortness of breath prompting ED evaluation at [place] and was sent to [address] [place] [address] for further evaluation and work-up. Cath scheduled for 10/14 without without significant blockage. Patient will need follow-up echo in 3 months and LifeVest at discharge. There is some concern for LV thrombus Definity echo pending ejection fraction now 15% secondary to ischemic cardiomyopathy  2. nonischemic cardiomyopathy: Continue Lasix, beta-blocker, potassium supplementation. Patient started on Entresto, needs LifeVest prior to discharge  3. Bilateral pleural effusion: Lasix 40 mg IV twice daily with caution with CECILIA  4. Acute kidney injury: Hold off on IV fluid resuscitation at this time due to bilateral pleural effusions and diuresis. 5. Seasonal allergies on over-the-counter meds  6.  PT/OT post cath scheduled for today     ANGY Keyes NP  10/14/2020  4:44 PM

## 2020-10-14 NOTE — PROGRESS NOTES
Pharmacy Note  Warfarin Consult    Snehal Rocha is a 59 y.o. male for whom pharmacy has been consulted to manage warfarin therapy. Consulting Physician: Toña Plasencia  Reason for Admission: acute heart failure    Warfarin dose prior to admission: new  Warfarin indication: New onset atrial fibrillation/flutter  Target INR range: 2.0-3.0     Past Medical History:   Diagnosis Date    Abnormal cardiovascular stress test 10/2020     Large inferior and inferoapical infarction, minimal faiza-infarct ischemia / Severely reduced LV systolic function. Adenocarcinoma (HCC)     Lip. ENAMORADO (dyspnea on exertion)     H/O echocardiogram 10/08/2020    ECHO 10/8/2020: EF 20%, grade I DD, mild TR, RVSP is 50 mmHg, moderate PHTN. Hyperlipidemia                 No results for input(s): INR in the last 72 hours. Recent Labs     10/13/20  0612 10/14/20  1156   HGB 13.9 14.3   HCT 42.2 44.2    249       Current warfarin drug-drug interactions: aspirin, acetaminophen, heparin      Date             INR        Dose   10/14/2020       ordered 5 mg    Daily PT/INR while inpatient. PT/INR ordered to start now. Thank you for the consult. Will continue to follow.   Sandra Greenberg, PharmD, BCPS  10/14/2020  5:21 PM

## 2020-10-14 NOTE — PLAN OF CARE
Problem: Cardiac:  Goal: Ability to maintain an adequate cardiac output will improve  Description: Ability to maintain an adequate cardiac output will improve  Outcome: Ongoing  Goal: Hemodynamic stability will improve  Description: Hemodynamic stability will improve  Outcome: Ongoing     Problem: Respiratory:  Goal: Respiratory status will improve  Description: Respiratory status will improve  Outcome: Ongoing     Problem: Gas Exchange - Impaired:  Goal: Levels of oxygenation will improve  Description: Levels of oxygenation will improve  Outcome: Ongoing

## 2020-10-14 NOTE — CARE COORDINATION
Case Management Initial Discharge Plan  Filipe Olivares             Met with:patient to discuss discharge plans. Information verified: address, contacts, phone number, , insurance Yes    Emergency Contact/Next of Kin name & number: Antonia Hopper wife [phone]    PCP: Isaak Portillo MD  Date of last visit: 2 weeks ago    Insurance Provider: Medicare [employer] [place]    Discharge Planning    Living Arrangements:  Spouse/Significant Other   Support Systems:  Family Members, Spouse/Significant Other    Home has 1 stories  2 stairs to climb to get into front door, 0stairs to climb to reach second floor  Location of bedroom/bathroom in home main    Patient able to perform ADL's:Independent    Current Services (outpatient & in home) none  DME equipment: none  DME provider: none    Receiving oral anticoagulation therapy? No    If indicated:   Physician managing anticoagulation treatment: n/a  Where does patient obtain lab work for ATC treatment? n/a      Potential Assistance Needed:  N/A    Patient agreeable to home care: No  [place] of choice provided:  no    Prior SNF/Rehab Placement and Facility: none  Agreeable to SNF/Rehab: No  [place] of choice provided: n/a     Evaluation: no    Expected Discharge date:       Patient expects to be discharged to:  home  Follow Up Appointment: Best Day/ Time:      Transportation provider: family  Transportation arrangements needed for discharge: no    Readmission Risk              Risk of Unplanned Readmission:        11             Does patient have a readmission risk score greater than 14?: No  If yes, follow-up appointment must be made within 7 days of discharge.      Goals of Care: answers      Discharge Plan: Home independently pcp established has transportation          Electronically signed by Silver Mccain RN on 10/13/20 at 8:08 PM EDT

## 2020-10-14 NOTE — ED NOTES
ED to inpatient nurses report    Chief Complaint   Patient presents with    Shortness of Breath     Pt transferred from [facility], scheduled for heart cath tomorrow, last two nights severe SOB that makes pt anxious      Present to ED from [facility]  LOC: alert and orientated to name, place, date  Vital signs   Vitals:    10/13/20 1302 10/13/20 1317 10/13/20 1702 10/13/20 2108   BP: 111/89 100/75 105/69 94/63   Pulse: 98 74 75 89   Resp:       Temp:       TempSrc:       SpO2: 91% 94% 95%    Weight:       Height:          Oxygen Baseline 96    Current needs required none   LDAs:   Peripheral IV 10/13/20 Left Antecubital (Active)     Mobility: Independent  Pending ED orders: trop  Present condition: stable  Code Status: full  Consults:  []  Hospitalist  Completed  [] yes [] no  []  Medicine  Completed  [] yes [] No  []  Cardiology  Completed  [] yes [] No  []  GI   Completed  [] yes [] No  []  Neurology  Completed  [] yes [] No  []  Nephrology Completed  [] yes [] No  []  Vascular  Completed  [] yes [] No   []  Surgery  Completed  [] yes [] No   []  Urology  Completed  [] yes [] No   []  Plastics  Completed  [] yes [] No   []  ENT  Completed  [] yes [] No   []  Other none Completed  [] yes [] No  Pertinent event(s)none  Pertinent event(s)none  Electronically signed by Kerri Mederos RN on 10/13/2020 at 11:01 PM     Emilee Walker RN  10/13/20 0001

## 2020-10-14 NOTE — PROGRESS NOTES
Patient admitted from 2017 to [facility] room 8, consent signed and questions answered. Patient ready for procedure. Call light to reach with side rails up 2 of 2. Bilat groin hairs and right wrist hair clipped. Family at bedside with patient. History and physical complete. Pain free with oxygen on 2 liters and without distress.

## 2020-10-14 NOTE — OP NOTE
[facility]        Date:   10/14/2020  Patient name:  Agnes Duran  Date of admission:  10/13/2020 11:09 AM  MRN:   [id number]  YOB: 1955    CARDIAC CATHETERIZATION    Operators:  Primary:Gabriel Stevens MD    CV Fellow: Eliseo Jennings M.D. Procedure performed:       [x] Left Heart Catheterization. [] Graft Angiography. [x] Left Ventriculography. [] Right Heart Catheterization. [x] Coronary Angiography. [] Aortic Valve Studies. [] PCI:      [] Other:       Pre Procedure Conscious Sedation Data:    ASA Class:    [] I [] II [x] III [] IV    Mallampati Class:  [] I [] II [x] III [] IV      Indication:  [] STEMI      [] + Stress test  [] ACS      [] + EKG Changes  [] Non Q MI       [] Significant Risk Factors  [] Recurrent Angina             [] Diabetes Mellitus    [] New LBBB      [] Uncontrolled HTN. [x] CHF / Low EF changes     [] Abnormal CTA / Ca Score  [] Other:     Procedure:  Access:  [x] Femoral artery  [] Radial  artery       [x] Right   [] Left    Procedure: After informed consent was obtained with explanation of the risks and benefits, patient was brought to the cath lab. The access area was prepped and draped in sterile fashion. 1% lidocaine was used for local block. The artery was cannulated with   Fr sheath with brisk arterial blood return. The side port was frequently flushed and aspirated with normal saline. Estimated blood loss: 10 ml    Findings:       LMCA: Mild irregularities 10-20%.     LAD: Mild irregularities 10-20%.     LCx: Mild irregularities 10-20%.     RCA: Mild irregularities 10-20%.     Coronary Tree      Dominance: Right     LV Analysis  LV function assessed as:Abnormal.  Ejection Fraction: 15%      Conclusions:   Mild CAD. 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 Severely reduced LV systolic function. LVEDP 28 mm Hg. Recommendations:      IV diuresis. Medical therapy for dilated cardiomyopathy. Echo with Definity to R/O Apical clot. Life vest as outpatient. Reassess EF in 3 months for AICD placement. History and Risk Factors    [] Hypertension     [] Family history of CAD  [] Hyperlipidemia     [] Cerebrovascular Disease   [] Prior MI       [] Peripheral Vascular disease   [] Prior PCI              [] Diabetes Mellitus    [] Left Main PCI. [] Currently on Dialysis. [] Prior CABG. [] Currently smoker. [] Cardiac Arrest outside of healthcare facility. [] Yes    [x] No        Witnessed     [] Yes   [] No     Arrest after arrival of EMS  [] Yes   [] No     [] Cardiac Arrest at other Facility. [] Yes   [] No    Pre-Procedure Information. Heart Failure       [x] Yes    [] No        Class  [] I      [] II  [] III    [] IV. New Diagnosis    [x] Yes  [] No    HF Type      [x] Systolic   [] Diastolic          [] Unknown. Diagnostic Test:   EKG       [] Normal   [x] Abnormal    New antiarrhythmia medications:    [] Yes   [] No   New onset atrial fibrillation / Flutter     [] Yes   [] No   ECG Abnormalities:      [] V. Fib   [] Lea V. Tach           [] NS V. T   [] New LBBB           [] T. Inv  []  ST dev > 0.5 mm         [] PVC's freq  [] PVC's infrequent    Stress Test Performed:      [] Yes    [x] No     Type:     [] Stress Echo   [] Exercise Stress Test (no imaging)      [] Stress Nuclear  [] Stress Imaging     Results   [] Negative   [] Positive        [] Indeterminate  [] Unavailable     If Positive/ Risk / Extent of Ischemia:       [] Low  [] Intermediate         [] High  [] Unavailable      Cardiac CTA Performed:     [] Yes    [x] No      Results   [] CAD   [] Non obstructive CAD      [] No CAD   [] Uncertain      [] Unknown   [] Structural Disease.      Pre Procedure Medications:   [] Yes    [x] No         [] ASA  [] Beta Blockers      [] Nitrate  [] Ca Channel Blockers      [] Ranolazine  [] Statin       [] Plavix/Others antiplatelets          Kathy Forbes MD  Fellow, Cardiovascular Diseases    [facility] Tammy Angeles MD. Attending Physician. [facility].

## 2020-10-15 PROBLEM — I51.3 APICAL MURAL THROMBUS: Status: ACTIVE | Noted: 2020-10-15

## 2020-10-15 PROBLEM — I50.21 ACUTE SYSTOLIC CHF (CONGESTIVE HEART FAILURE) (HCC): Status: ACTIVE | Noted: 2020-10-13

## 2020-10-15 LAB
ABSOLUTE EOS #: 0.22 K/UL (ref 0–0.44)
ABSOLUTE IMMATURE GRANULOCYTE: 0.03 K/UL (ref 0–0.3)
ABSOLUTE LYMPH #: 1.67 K/UL (ref 1.1–3.7)
ABSOLUTE MONO #: 1.12 K/UL (ref 0.1–1.2)
ALBUMIN SERPL-MCNC: 3.7 G/DL (ref 3.5–5.2)
ANION GAP SERPL CALCULATED.3IONS-SCNC: 11 MMOL/L (ref 9–17)
BASOPHILS # BLD: 1 % (ref 0–2)
BASOPHILS ABSOLUTE: 0.07 K/UL (ref 0–0.2)
BUN BLDV-MCNC: 36 MG/DL (ref 8–23)
BUN/CREAT BLD: ABNORMAL (ref 9–20)
CALCIUM SERPL-MCNC: 8.9 MG/DL (ref 8.6–10.4)
CHLORIDE BLD-SCNC: 102 MMOL/L (ref 98–107)
CO2: 22 MMOL/L (ref 20–31)
CREAT SERPL-MCNC: 1.06 MG/DL (ref 0.7–1.2)
DIFFERENTIAL TYPE: ABNORMAL
EKG ATRIAL RATE: 95 BPM
EKG P AXIS: 57 DEGREES
EKG P-R INTERVAL: 204 MS
EKG Q-T INTERVAL: 380 MS
EKG QRS DURATION: 78 MS
EKG QTC CALCULATION (BAZETT): 477 MS
EKG R AXIS: -83 DEGREES
EKG T AXIS: 70 DEGREES
EKG VENTRICULAR RATE: 95 BPM
EOSINOPHILS RELATIVE PERCENT: 2 % (ref 1–4)
GFR AFRICAN AMERICAN: >60 ML/MIN
GFR NON-AFRICAN AMERICAN: >60 ML/MIN
GFR SERPL CREATININE-BSD FRML MDRD: ABNORMAL ML/MIN/{1.73_M2}
GFR SERPL CREATININE-BSD FRML MDRD: ABNORMAL ML/MIN/{1.73_M2}
GLUCOSE BLD-MCNC: 110 MG/DL (ref 70–99)
HCT VFR BLD CALC: 44.1 % (ref 40.7–50.3)
HEMOGLOBIN: 14.4 G/DL (ref 13–17)
IMMATURE GRANULOCYTES: 0 %
INR BLD: 1
LYMPHOCYTES # BLD: 17 % (ref 24–43)
MCH RBC QN AUTO: 29.1 PG (ref 25.2–33.5)
MCHC RBC AUTO-ENTMCNC: 32.7 G/DL (ref 28.4–34.8)
MCV RBC AUTO: 89.3 FL (ref 82.6–102.9)
MONOCYTES # BLD: 12 % (ref 3–12)
NRBC AUTOMATED: 0 PER 100 WBC
PARTIAL THROMBOPLASTIN TIME: 53.3 SEC (ref 20.5–30.5)
PARTIAL THROMBOPLASTIN TIME: 59.9 SEC (ref 20.5–30.5)
PDW BLD-RTO: 12.5 % (ref 11.8–14.4)
PHOSPHORUS: 3.2 MG/DL (ref 2.5–4.5)
PLATELET # BLD: 237 K/UL (ref 138–453)
PLATELET ESTIMATE: ABNORMAL
PMV BLD AUTO: 10.6 FL (ref 8.1–13.5)
POTASSIUM SERPL-SCNC: 4 MMOL/L (ref 3.7–5.3)
PROTHROMBIN TIME: 10.8 SEC (ref 9–12)
RBC # BLD: 4.94 M/UL (ref 4.21–5.77)
RBC # BLD: ABNORMAL 10*6/UL
SEG NEUTROPHILS: 68 % (ref 36–65)
SEGMENTED NEUTROPHILS ABSOLUTE COUNT: 6.48 K/UL (ref 1.5–8.1)
SODIUM BLD-SCNC: 135 MMOL/L (ref 135–144)
WBC # BLD: 9.6 K/UL (ref 3.5–11.3)
WBC # BLD: ABNORMAL 10*3/UL

## 2020-10-15 PROCEDURE — 6370000000 HC RX 637 (ALT 250 FOR IP): Performed by: STUDENT IN AN ORGANIZED HEALTH CARE EDUCATION/TRAINING PROGRAM

## 2020-10-15 PROCEDURE — 6370000000 HC RX 637 (ALT 250 FOR IP): Performed by: NURSE PRACTITIONER

## 2020-10-15 PROCEDURE — 36415 COLL VENOUS BLD VENIPUNCTURE: CPT

## 2020-10-15 PROCEDURE — APPSS180 APP SPLIT SHARED TIME > 60 MINUTES: Performed by: NURSE PRACTITIONER

## 2020-10-15 PROCEDURE — 85610 PROTHROMBIN TIME: CPT

## 2020-10-15 PROCEDURE — 85730 THROMBOPLASTIN TIME PARTIAL: CPT

## 2020-10-15 PROCEDURE — 2580000003 HC RX 258: Performed by: STUDENT IN AN ORGANIZED HEALTH CARE EDUCATION/TRAINING PROGRAM

## 2020-10-15 PROCEDURE — 6360000002 HC RX W HCPCS: Performed by: STUDENT IN AN ORGANIZED HEALTH CARE EDUCATION/TRAINING PROGRAM

## 2020-10-15 PROCEDURE — 97161 PT EVAL LOW COMPLEX 20 MIN: CPT

## 2020-10-15 PROCEDURE — 2580000003 HC RX 258: Performed by: NURSE PRACTITIONER

## 2020-10-15 PROCEDURE — 85025 COMPLETE CBC W/AUTO DIFF WBC: CPT

## 2020-10-15 PROCEDURE — 99233 SBSQ HOSP IP/OBS HIGH 50: CPT | Performed by: INTERNAL MEDICINE

## 2020-10-15 PROCEDURE — 2060000000 HC ICU INTERMEDIATE R&B

## 2020-10-15 PROCEDURE — 97530 THERAPEUTIC ACTIVITIES: CPT

## 2020-10-15 PROCEDURE — 80069 RENAL FUNCTION PANEL: CPT

## 2020-10-15 RX ORDER — SODIUM CHLORIDE 9 MG/ML
INJECTION, SOLUTION INTRAVENOUS CONTINUOUS
Status: ACTIVE | OUTPATIENT
Start: 2020-10-15 | End: 2020-10-15

## 2020-10-15 RX ORDER — MIDODRINE HYDROCHLORIDE 2.5 MG/1
2.5 TABLET ORAL
Status: DISCONTINUED | OUTPATIENT
Start: 2020-10-15 | End: 2020-10-15

## 2020-10-15 RX ORDER — MIDODRINE HYDROCHLORIDE 5 MG/1
5 TABLET ORAL
Status: DISCONTINUED | OUTPATIENT
Start: 2020-10-15 | End: 2020-10-16

## 2020-10-15 RX ADMIN — WARFARIN SODIUM 5 MG: 5 TABLET ORAL at 18:05

## 2020-10-15 RX ADMIN — FUROSEMIDE 40 MG: 10 INJECTION, SOLUTION INTRAMUSCULAR; INTRAVENOUS at 18:05

## 2020-10-15 RX ADMIN — SODIUM CHLORIDE: 9 INJECTION, SOLUTION INTRAVENOUS at 18:04

## 2020-10-15 RX ADMIN — Medication 10 ML: at 09:45

## 2020-10-15 RX ADMIN — ATORVASTATIN CALCIUM 80 MG: 80 TABLET, FILM COATED ORAL at 20:26

## 2020-10-15 RX ADMIN — SACUBITRIL AND VALSARTAN 1 TABLET: 24; 26 TABLET, FILM COATED ORAL at 09:45

## 2020-10-15 RX ADMIN — HEPARIN SODIUM AND DEXTROSE 20 UNITS/KG/HR: 10000; 5 INJECTION INTRAVENOUS at 16:59

## 2020-10-15 RX ADMIN — MIDODRINE HYDROCHLORIDE 5 MG: 5 TABLET ORAL at 18:05

## 2020-10-15 RX ADMIN — HEPARIN SODIUM AND DEXTROSE 18 UNITS/KG/HR: 10000; 5 INJECTION INTRAVENOUS at 11:11

## 2020-10-15 RX ADMIN — FUROSEMIDE 40 MG: 10 INJECTION, SOLUTION INTRAMUSCULAR; INTRAVENOUS at 09:45

## 2020-10-15 RX ADMIN — HEPARIN SODIUM 3810 UNITS: 1000 INJECTION INTRAVENOUS; SUBCUTANEOUS at 16:57

## 2020-10-15 RX ADMIN — FAMOTIDINE 20 MG: 20 TABLET, FILM COATED ORAL at 09:45

## 2020-10-15 RX ADMIN — Medication 81 MG: at 09:45

## 2020-10-15 ASSESSMENT — ENCOUNTER SYMPTOMS
CONSTIPATION: 0
ABDOMINAL DISTENTION: 0
DIARRHEA: 0
COUGH: 1
NAUSEA: 0
ABDOMINAL PAIN: 0
SORE THROAT: 0
SINUS PAIN: 0
VOMITING: 0
SINUS PRESSURE: 0
RHINORRHEA: 0
SHORTNESS OF BREATH: 1

## 2020-10-15 NOTE — PROGRESS NOTES
[facility]  Office: [address], DO, Antionette Gross, DO, Greg Sainz, DO, Lakeshia Swain, DO, Sage Bundy MD, Cirilo Zapata MD, Heather Phillips MD, Ayanna Bernardo MD, Cris Mo MD, Stephon Khan MD, Tru Silva MD, Maren Coe MD, Vadim Rae MD, Lydia Parker DO, Vika Britt MD, Sara Mary MD, Tu Padilla DO, Connie Alaniz MD,  Nader Alston DO, Balwinder Ryan MD, Chris Keating MD, Bulmaro Lyons, [facility], [facility], CNP, Arslan Han, CNP, Ion Langston, CNS, Irma Preciado, CNP, Claude Leep, CNP, Astrid Costello, CNP, Deloris Manning, CNP, Vaishali Ching, CNP, Jeramy Hurtado PA-C, Denise Wheatley, [facility], Susan Munson, CNP, Roxanne Murray, CNP, Pinky Cruz, CNP, Shaniqua Scott, CNP, Yves Washington, [facility]   [address] [facility]    Progress Note    10/15/2020    8:05 AM    Name:   Maxine Higginbotham  MRN:     [id number]     Acct:      [de-identified]   Room:   2017/2017-01   Day:  2  Admit Date:  10/13/2020 11:09 AM    PCP:   Carri Huntley MD  Code Status:  Full Code    Subjective:     C/C:   Chief Complaint   Patient presents with    Shortness of Breath     Pt transferred from [facility], scheduled for heart cath tomorrow, last two nights severe SOB that makes pt anxious     Interval History Status: not changed. Patient evaluated in room sitting in bed in no acute distress. Life vest intact. INR 1.0 today with heparin gtt and coumadin bridging. Patient and wife updated in room about plan , all questions answered     Brief History:     Per records:    Maxine Higginbotham is a 59 y.o.  Non-/non  male who presents with Shortness of Breath (Pt transferred from [facility], scheduled for heart cath tomorrow, last two nights severe SOB that makes pt anxious)   and is admitted to the hospital for the management of SOB     patient was previously scheduled for cardiac cath after abnormal stress test in the outpatient setting on 10/14. Unfortunately patient developed worsening shortness of breath prompting ED evaluation at [place] and was sent to [facility] for further evaluation and work-up. Cardiology evaluated the patient in the emergency department and we were asked to admit the patient for further work-up and management. Cardiac cath planned for tomorrow. N.p.o. at midnight     Cardiac cath not demonstrating ischemic etiology  Severely reduced ejection fraction and possible LV thrombus identified  Follow-up echo showing apical thrombus   Needs LifeVest/coumadin prior to discharge      Review of Systems:     Review of Systems   Constitutional: Negative for activity change, appetite change, chills and fever. HENT: Positive for postnasal drip. Negative for congestion, rhinorrhea, sinus pressure, sinus pain and sore throat. Eyes: Negative for visual disturbance. Respiratory: Positive for cough and shortness of breath. Orthopnea     Cardiovascular: Negative for chest pain, palpitations and leg swelling. Gastrointestinal: Negative for abdominal distention, abdominal pain, constipation, diarrhea, nausea and vomiting. Genitourinary: Positive for frequency. Negative for difficulty urinating and urgency. Musculoskeletal: Negative for arthralgias and myalgias. Skin: Negative for rash and wound. Neurological: Negative for dizziness, weakness and headaches. Hematological: Negative for adenopathy. Psychiatric/Behavioral: Negative for confusion. Medications: Allergies:     Allergies   Allergen Reactions    Penicillins Other (See Comments)     As a child- unsure of reaction       Current Meds:   Scheduled Meds:    furosemide  40 mg Intravenous BID    sodium chloride flush  10 mL Intravenous 2 times per day    metoprolol succinate  25 mg Oral Daily    sacubitril-valsartan  1 tablet Oral BID    warfarin  5 mg Oral Daily    warfarin (COUMADIN) daily dosing (placeholder)   Other RX Placeholder    aspirin  81 mg Oral Daily    sodium chloride flush  10 mL Intravenous 2 times per day    famotidine  20 mg Oral Daily    atorvastatin  80 mg Oral Nightly     Continuous Infusions:    heparin (PORCINE) Infusion 18 Units/kg/hr (10/14/20 2030)     PRN Meds: sodium chloride flush, acetaminophen, heparin (porcine), heparin (porcine), sodium chloride flush, potassium chloride **OR** potassium alternative oral replacement **OR** potassium chloride, magnesium sulfate, acetaminophen **OR** acetaminophen, magnesium hydroxide, promethazine **OR** ondansetron, nitroGLYCERIN    Data:     Past Medical History:   has a past medical history of Abnormal cardiovascular stress test, Adenocarcinoma ([address] ENAMORADO (dyspnea on exertion), H/O echocardiogram, and Hyperlipidemia. Social History:   reports that he has never smoked. He has never used smokeless tobacco. He reports that he does not drink alcohol or use drugs. Family History:   Family History   Problem Relation Age of Onset    Heart Attack Father         Myocardial infarction in his 76s.  Diabetes Father     Glaucoma Mother        Vitals:  BP 99/72   Pulse 82   Temp 97.9 °F (36.6 °C) (Oral)   Resp 14   Ht 6' 2\" (1.88 m)   Wt 205 lb 1.6 oz (93 kg)   SpO2 95%   BMI 26.33 kg/m²   Temp (24hrs), Av.3 °F (36.3 °C), Min:96.6 °F (35.9 °C), Max:97.9 °F (36.6 °C)    No results for input(s): POCGLU in the last 72 hours. I/O (24Hr):     Intake/Output Summary (Last 24 hours) at 10/15/2020 0805  Last data filed at 10/15/2020 0657  Gross per 24 hour   Intake 1025 ml   Output 2610 ml   Net -1585 ml       Labs:  Hematology:  Recent Labs     10/14/20  1156 10/14/20  1849 10/15/20  0424   WBC 7.4 8.9 9.6   RBC 4.92 5.12 4.94   HGB 14.3 14.9 14.4   HCT 44.2 46.4 44.1   MCV 89.8 90.6 89.3   MCH 29.1 29.1 29.1   MCHC 32.4 32.1 32.7   RDW 12.6 12.4 12.5    255 237   MPV 10.6 10.9 10.6   INR  --  1.0  -- Chemistry:  Recent Labs     10/13/20  0612 10/13/20  1616 10/13/20  2115 10/14/20  0107 10/14/20  1156 10/14/20  1849 10/15/20  0424     --   --   --  138  --  135   K 4.5  --   --   --  5.0  --  4.0     --   --   --  103  --  102   CO2 22  --   --   --  22  --  22   GLUCOSE 111*  --   --   --  93  --  110*   BUN 28*  --   --   --  30*  --  36*   CREATININE 1.28*  --   --   --  1.05  --  1.06   MG  --   --   --   --  2.2  --   --    ANIONGAP 12  --   --   --  13  --  11   LABGLOM 57*  --   --   --  >60  --  >60   GFRAA >60  --   --   --  >60  --  >60   CALCIUM 9.8  --   --   --  9.1  --  8.9   PHOS  --   --   --   --   --   --  3.2   PROBNP 7,339*  --   --   --   --  3,720*  --    TROPHS 21 23* 17 24*  --   --   --      Recent Labs     10/14/20  1156 10/15/20  0424   PROT 6.9  --    LABALBU 3.8 3.7   AST 14  --    ALT 12  --    ALKPHOS 73  --    BILITOT 0.93  --      ABG:No results found for: POCPH, PHART, PH, POCPCO2, PQH3KWH, PCO2, POCPO2, PO2ART, PO2, POCHCO3, EDR5DPI, HCO3, NBEA, PBEA, BEART, BE, THGBART, THB, LVA3ADF, ATZP8ZLJ, C6RBIRCR, O2SAT, FIO2  No results found for: SPECIAL  No results found for: CULTURE    Radiology:  Xr Chest Portable    Result Date: 10/13/2020  1. Bilateral lower lung multifocal ill-defined airspace disease. 2. Suspected mild bilateral pleural effusions. Physical Examination:        Physical Exam  Vitals signs and nursing note reviewed. Constitutional:       Appearance: Normal appearance. HENT:      Head: Normocephalic and atraumatic. Mouth/Throat:      Mouth: Mucous membranes are moist.   Eyes:      Extraocular Movements: Extraocular movements intact. Pupils: Pupils are equal, round, and reactive to light. Neck:      Musculoskeletal: Normal range of motion and neck supple. Vascular: No carotid bruit. Cardiovascular:      Rate and Rhythm: Normal rate and regular rhythm. Pulses: Normal pulses. Heart sounds: Normal heart sounds.  No murmur. Pulmonary:      Effort: Pulmonary effort is normal.      Comments: 2 L LFNC, Left posterior crackles not clearing with coughing   Abdominal:      General: Bowel sounds are normal. There is no distension. Palpations: Abdomen is soft. Tenderness: There is no abdominal tenderness. There is no guarding. Musculoskeletal: Normal range of motion. Right lower leg: No edema. Left lower leg: No edema. Lymphadenopathy:      Cervical: No cervical adenopathy. Skin:     General: Skin is warm and dry. Capillary Refill: Capillary refill takes less than 2 seconds. Neurological:      General: No focal deficit present. Mental Status: He is alert and oriented to person, place, and time. Psychiatric:         Mood and Affect: Mood normal.         Behavior: Behavior normal.         Assessment:        Hospital Problems           Last Modified POA    * (Principal) Acute systolic CHF (congestive heart failure) ([address] 10/15/2020 Yes    ENAMORADO (dyspnea on exertion) 10/13/2020 Yes    Abnormal stress test 10/13/2020 Yes    CECILIA (acute kidney injury) ([address] 10/13/2020 Yes    Bilateral pleural effusion 10/13/2020 Yes    Seasonal allergies 10/13/2020 Yes    Nonischemic cardiomyopathy ([address] 10/14/2020 Yes    Congestive heart failure ([address] 10/14/2020 Yes    Apical mural thrombus 10/15/2020 Yes          Plan:        1. Acute systolic CHF: New diagnosis. EF calculated to be at 24%-  Continue lasix and increased dosing. BB increased, entresto. Potassium as warranted. Life Vest at discharge. 2. Hypotension: asymptomatic, may need midodrine, hold for now  3. Apical thrombus: heparin infusion with coumadin bridging   4. Nonischemic cardiomyopathy: Continue Lasix, beta-blocker, potassium supplementation. Patient started on Entresto, needs LifeVest prior to discharge  5. Bilateral pleural effusion: Lasix 40 mg IV twice daily with caution with CECILIA  6.  Acute kidney injury: Hold off on IV fluid resuscitation at

## 2020-10-15 NOTE — PROGRESS NOTES
[facility]  Progress Note                   Date:   10/15/2020  Patient name: Kay Chang  Date of admission:  10/13/2020 11:09 AM  MRN:   [id number]  YOB: 1955  PCP: Josué Rose MD    Reason for Admission: Chest pain [R07.9]    Subjective:       Clinical Changes /Abnormalities:   Patient seen and examined in room. S/p cardiac cath with mild CAD without PCI 10/14/2020. Apical thrombus identified on ECHO 10/14/2020 on coumadin with heparin bridge. INR 1.0  Coumadin dosing per pharmacy. SR on tele HR 82    Review of Systems    Medications:   Scheduled Meds:   furosemide  40 mg Intravenous BID    sodium chloride flush  10 mL Intravenous 2 times per day    metoprolol succinate  25 mg Oral Daily    sacubitril-valsartan  1 tablet Oral BID    warfarin  5 mg Oral Daily    warfarin (COUMADIN) daily dosing (placeholder)   Other RX Placeholder    aspirin  81 mg Oral Daily    sodium chloride flush  10 mL Intravenous 2 times per day    famotidine  20 mg Oral Daily    atorvastatin  80 mg Oral Nightly     Continuous Infusions:   heparin (PORCINE) Infusion 18 Units/kg/hr (10/15/20 1111)     CBC:   Recent Labs     10/14/20  1156 10/14/20  1849 10/15/20  0424   WBC 7.4 8.9 9.6   HGB 14.3 14.9 14.4    255 237     BMP:    Recent Labs     10/13/20  0612 10/14/20  1156 10/15/20  0424    138 135   K 4.5 5.0 4.0    103 102   CO2 22 22 22   BUN 28* 30* 36*   CREATININE 1.28* 1.05 1.06   GLUCOSE 111* 93 110*     Hepatic:  Recent Labs     10/14/20  1156   AST 14   ALT 12   BILITOT 0.93   ALKPHOS 73     Troponin:   Recent Labs     10/13/20  1616 10/13/20  2115 10/14/20  0107   TROPHS 23* 17 24*     BNP: No results for input(s): BNP in the last 72 hours. Lipids: No results for input(s): CHOL, HDL in the last 72 hours.     Invalid input(s): LDLCALCU  INR:   Recent Labs     10/14/20  1849 10/15/20  1138   INR 1.0 1.0     DIAGNOSTIC DATA    ECHO10/14/2020 with definity  Summary  Global left ventricular systolic function is severely reduced. Estimated  ejection fraction is 20-25 % . Calculated EF via heart model is 24 %. An echogenic structure measuring approximately 1.75 cm x 2.12 cm is seen in  the apex of the left ventricle and appears like a thrombus formation. Definity was used to optimize images revealing an apical thrombus. CATH 10/14/2020  Findings:        LMCA: Mild irregularities 10-20%.     LAD: Mild irregularities 10-20%.     LCx: Mild irregularities 10-20%.     RCA: Mild irregularities 10-20%.     Coronary Tree      Dominance: Right     LV Analysis  LV function assessed as:Abnormal.  Ejection Fraction: 15%        Conclusions:   Mild CAD. 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   Severely reduced LV systolic function. LVEDP 28 mm Hg.           Recommendations:      IV diuresis. Medical therapy for dilated cardiomyopathy.   Echo with Definity to R/O Apical clot.   Life vest as outpatient.   Reassess EF in 3 months for AICD placement. EKG: SR, anterolateral and inferior infarct     ECHO 10/8/2020: EF 20%, grade I DD, mild TR, RVSP is 50 mmHg, moderate PHTN.      STRESS 10/8/2020: Large inferior and inferoapical infarction. Minimal faiza-infarct ischemia. EF 15%.       Objective:   Vitals: BP (!) 94/58   Pulse 83   Temp 97.2 °F (36.2 °C) (Oral)   Resp 17   Ht 6' 2\" (1.88 m)   Wt 205 lb 1.6 oz (93 kg)   SpO2 95%   BMI 26.33 kg/m²   General appearance: alert and cooperative with exam  HEENT: Head: Normocephalic, no lesions, without obvious abnormality. Neck:no JVD, trachea midline, no adenopathy  Lungs: Clear to auscultation  Heart: Regular rate and rhythm, s1/s2 auscultated, no murmurs  Abdomen: soft, non-tender, bowel sounds active  Extremities: no edema  Neurologic: not done  Right femoral artery site:  CDI  Without ecchymosis or hematoma. Soft +pulse. Assessment / Acute Cardiac Problems:   1.  Dyspnea on exertion 2/2 to

## 2020-10-15 NOTE — PLAN OF CARE
Problem: Cardiac:  Goal: Ability to maintain an adequate cardiac output will improve  Description: Ability to maintain an adequate cardiac output will improve  Outcome: Ongoing  Goal: Hemodynamic stability will improve  Description: Hemodynamic stability will improve  Outcome: Ongoing     Problem: Respiratory:  Goal: Respiratory status will improve  Description: Respiratory status will improve  Outcome: Ongoing     Problem: Gas Exchange - Impaired:  Goal: Levels of oxygenation will improve  Description: Levels of oxygenation will improve  Outcome: Ongoing     Problem: Falls - Risk of:  Goal: Will remain free from falls  Description: Will remain free from falls  Outcome: Ongoing  Goal: Absence of physical injury  Description: Absence of physical injury  Outcome: Ongoing

## 2020-10-15 NOTE — PROGRESS NOTES
Physical Therapy    Facility/Department: [facility]  Initial Assessment    NAME: Davidson Chacon  : 1955  MRN: [id number]  Date of Service: 10/15/2020  Chief Complaint   Patient presents with    Shortness of Breath     Pt transferred from [facility], scheduled for heart cath tomorrow, last two nights severe SOB that makes pt anxious       Discharge Recommendations:    No therapy recommended at discharge. PT Equipment Recommendations  Equipment Needed: No    Assessment   Assessment: Pt independent with bed mobility. Ambulated 450' no AD and CGA. No LOB, buckling, or reports of pain. D/c PT services. Prognosis: Good  Decision Making: Low Complexity  PT Education: Plan of Care;PT Role;General Safety  No Skilled PT: Independent with functional mobility   REQUIRES PT FOLLOW UP: No  Activity Tolerance  Activity Tolerance: Patient Tolerated treatment well       Patient Diagnosis(es): The encounter diagnosis was Congestive heart failure, unspecified HF chronicity, unspecified heart failure type ([address] has a past medical history of Abnormal cardiovascular stress test, Adenocarcinoma ([address] ENAMORADO (dyspnea on exertion), H/O echocardiogram, and Hyperlipidemia. has a past surgical history that includes Colonoscopy (10/27/2010); Mouth surgery (2007); and Cardiac catheterization (10/14/2020). Restrictions  Restrictions/Precautions  Restrictions/Precautions: General Precautions  Required Braces or Orthoses?: No  Vision/Hearing  Vision: Impaired  Vision Exceptions: Wears glasses for distance;Wears glasses for reading  Hearing: Within functional limits     Subjective  General  Chart Reviewed: Yes  Patient assessed for rehabilitation services?: Yes  Response To Previous Treatment: Not applicable  Family / Caregiver Present: Yes  Follows Commands: Within Functional Limits  Subjective  Subjective: Pt and RN agreeable for PT services.  Pt laying in bed upon arrival.  Pain Screening  Patient Currently in Pain: No  Vital Signs  Patient Currently in Pain: No  Pre Treatment Pain Screening  Intervention List: Patient able to continue with treatment    Orientation     Social/Functional History  Social/Functional History  Lives With: Spouse  Type of Home: House  Home Layout: One level  Home Access: Stairs to enter without rails  Entrance Stairs - Number of Steps: 2 small steps (able to navigate with walker  Entrance Stairs - Rails: Both  Bathroom Shower/Tub: Tub/Shower unit  Bathroom Toilet: Standard(walls nearby)  Bathroom Equipment: Grab bars in shower  Bathroom Accessibility: Accessible  ADL Assistance: [address] [place] [address] Avenue: [address] [place] [address] Responsibilities: Yes  Ambulation Assistance: Independent  Transfer Assistance: Independent  Active : Yes  Mode of Transportation: Car  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  Leisure & Hobbies: deer hunting, up tree stand, golf  Cognition        Objective        AROM RLE (degrees)  RLE AROM: WFL  AROM LLE (degrees)  LLE AROM : WFL  AROM RUE (degrees)  RUE AROM : WFL  AROM LUE (degrees)  LUE AROM : WFL  Strength RLE  Strength RLE: WFL  Strength LLE  Strength LLE: WFL  Strength RUE  Strength RUE: WFL  Strength LUE  Strength LUE: WFL     Sensation  Overall Sensation Status: WNL(Pt denies any numbness or tingling)  Bed mobility  Rolling to Left: Independent  Rolling to Right: Independent  Supine to Sit: Independent  Sit to Supine: Independent  Scooting: Independent  Transfers  Sit to Stand: Independent  Stand to sit:  Independent  Ambulation  Ambulation?: Yes  Ambulation 1  Surface: level tile  Device: No Device  Assistance: Contact guard assistance  Quality of Gait: Steady annette and reciprocal gait  Gait Deviations: Shuffles  Distance: 450'     Balance  Posture: Fair  Sitting - Static: Good  Sitting - Dynamic: Good  Standing - Static: Good  Standing - Dynamic: Good  Comments: no AD used        Plan   Plan  Times per week: d/c PT  Safety Devices  Type of devices: Call light within reach, Gait belt, All fall risk precautions in place, Nurse notified, Left in bed  Restraints  Initially in place: No    AM-PAC Score  AM-PAC Inpatient Mobility Raw Score : 24 (10/15/20 1339)  AM-PAC Inpatient T-Scale Score : 61.14 (10/15/20 1339)  Mobility Inpatient CMS 0-100% Score: 0 (10/15/20 1339)  Mobility Inpatient CMS G-Code Modifier : [facility] (10/15/20 1339)        Goals  Short term goals  Time Frame for Short term goals: d/c PT     Therapy Time   Individual Concurrent Group Co-treatment   Time In 1252         Time Out 1319         Minutes 27         Timed Code Treatment Minutes: 8 Minutes     Physical therapy to sign off. Summer Urbina    This treatment/evaluation completed by signing SPT. Signing PT agrees with treatment and documentation.

## 2020-10-15 NOTE — PROGRESS NOTES
Physician Progress Note      PATIENT:               Grayson Yanes  [facility] #:                  [id number]  :                       1955  ADMIT DATE:       10/13/2020 11:09 AM  DISCH DATE:  RESPONDING  PROVIDER #:        Skylar Murphy TEXT:    Patient admitted with Acute chf   Noted documentation of Acute Kidney Injury   in cardiology and progress notes 10/13-10/15Please document in progress notes   and discharge summary:    The medical record reflects the following:  Risk Factors: nonischemic cardiomyopathy, acute chf  Clinical Indicators: creat 1.28 on admission and on 10/14 and 10/15 creat 1.05   and 1.06  Treatment: monitoring  Defined by Kidney Disease Improving Global Outcomes (KDIGO) clinical practice   guideline for acute kidney injury:  -Increase in SCr by ? 0.3 mg/dl within 48 hours; or  -Increase in SCr to ? 1.5 times baseline, which is known or presumed to have   occurred within the prior 7 days; or  -Urine volume < 0.5ml/kg/h for 6 hours    Thank you Yoli Tomlinson RN CCDS BSN. . call if questions [phone]  Options provided:  -- Acute kidney injury evidenced by, Please document evidence. -- Currently resolved acute kidney injury was evidenced by, Please document   evidence. -- Acute kidney injury ruled out after study  -- Other - I will add my own diagnosis  -- Disagree - Not applicable / Not valid  -- Disagree - Clinically unable to determine / Unknown  -- Refer to Clinical Documentation Reviewer    PROVIDER RESPONSE TEXT:    Now resolved acute kidney injury was evidenced by baseline Scr 0.8 which   increased to 1.3 now resolved. Query created by: Prabha Shultz on 10/15/2020 1:47 PM      Electronically signed by:   Jose Antonio Burnham 10/15/2020 3:33 PM

## 2020-10-15 NOTE — PROGRESS NOTES
Pharmacy Note  Warfarin Consult follow-up      Recent Labs     10/15/20  1138   INR 1.0     Recent Labs     10/14/20  1156 10/14/20  1849 10/15/20  0424   HGB 14.3 14.9 14.4   HCT 44.2 46.4 44.1    255 237       Significant Drug-Drug Interactions:  New warfarin drug-drug interactions: none  Discontinued drug-drug interactions: none  Current warfarin drug-drug interactions: acetaminophen, aspirin, heparin      Date INR Dose   10/14/2020 1.0 5mg   10/15/2020 1.0 5 mg        Notes:                   Patient to receive his second consecutive dose of warfarin 5 mg today. Daily PT/INR while inpatient. [address] [place] [address]  Ph., CACP, Clinical Pharmacist  Anticoagulation Services, [address] [facility]  10/15/2020  12:59 PM

## 2020-10-15 NOTE — PROGRESS NOTES
Made Uriel Fernandez aware that Pt asking about his oxygen sats. He is on room air now and sating 93-97% in bed. Night RN said he was having sleep apnea all night and kept him on 2l NC. He possibly may need an OP sleep study. Will let pt know to contact PCP or order for sleep study and she already spoke with pt on this matter today per BRIANNE Del Rio       Made attn and cardiology aware: Pts BP is 80/57 (62) HR 77, RR 18, O2 97. Pt is asymptomatic sitting in bed    Spoke with phlebotomist a little after 11am this morning and asked if she was coming to draw a PTT. She said she was on her way. Nothing resulted. Called Phleb again (different person) they said they could not see the order. Called lab they said it looks like it was drawn and never sent. They asked if they could use the noon PT draw and I made her aware that would not be accurate since so much time has passed. So I d/c the 1030am order and placed another order for PTT draw stat.

## 2020-10-15 NOTE — FLOWSHEET NOTE
Assessment: The patient's wife was at bedside. The patient said that he is feeling fine, he just wish they could figure out what it going on. The patients wife was very anxious. Intervention:  engaged in active listening.  explored the patient's thoughts and feelings.  inquired about the patients support system; family, friends, and community groups.  asked if the patient would like prayer and informed the patient that chaplains are available 24/7. Outcome: The patient engaged in the conversation. 10/15/20 1510   Encounter Summary   Services provided to: Patient and family together   Referral/Consult From: [address] [place] [address] SNAPP' No   Continue Visiting   (10/15/20)   Complexity of Encounter Moderate   Length of Encounter 15 minutes   Spiritual Assessment Completed Yes   Routine   Type Initial   Assessment Calm; Approachable; Anxious   Intervention Active listening;Explored feelings, thoughts, concerns;Explored coping resources;Nurtured hope;Discussed illness/injury and it's impact   Outcome Engaged in conversation;Comfort;Expressed feelings/needs/concerns

## 2020-10-15 NOTE — PROGRESS NOTES
Occupational Therapy Not Seen Note    DATE: 10/15/2020  Name: Kimani Campbell  : 1955  MRN: [id number]    Patient not available for Occupational Therapy due to:    Pt independent with functional mobility and functional tasks.  Pt with no OT acute care needs at this time, will defer OT eval.    Next Scheduled Treatment: NA     Electronically signed by MARCELLE Giordano on 10/15/2020 at 1:26 PM

## 2020-10-15 NOTE — PLAN OF CARE
Problem: Cardiac:  Goal: Ability to maintain an adequate cardiac output will improve  Description: Ability to maintain an adequate cardiac output will improve  Outcome: Ongoing  Goal: Hemodynamic stability will improve  Description: Hemodynamic stability will improve  Outcome: Ongoing

## 2020-10-15 NOTE — PROGRESS NOTES
Nutrition Education    · Verbally reviewed information with Patient and pt's wife   · Educated on cardiac diet   · Written educational materials provided. · Contact name and number provided.     Electronically signed by Elle Jordan RD, LD on 10/15/20 at 10:05 AM EDT    Contact: [phone]

## 2020-10-16 ENCOUNTER — TELEPHONE (OUTPATIENT)
Dept: INTERNAL MEDICINE | Age: 65
End: 2020-10-16

## 2020-10-16 VITALS
WEIGHT: 204.7 LBS | BODY MASS INDEX: 26.27 KG/M2 | DIASTOLIC BLOOD PRESSURE: 70 MMHG | SYSTOLIC BLOOD PRESSURE: 95 MMHG | TEMPERATURE: 97.7 F | HEART RATE: 77 BPM | RESPIRATION RATE: 16 BRPM | HEIGHT: 74 IN | OXYGEN SATURATION: 96 %

## 2020-10-16 PROBLEM — R94.39 ABNORMAL STRESS TEST: Status: RESOLVED | Noted: 2020-10-13 | Resolved: 2020-10-16

## 2020-10-16 PROBLEM — J90 BILATERAL PLEURAL EFFUSION: Status: RESOLVED | Noted: 2020-10-13 | Resolved: 2020-10-16

## 2020-10-16 PROBLEM — N17.9 AKI (ACUTE KIDNEY INJURY) (HCC): Status: RESOLVED | Noted: 2020-10-13 | Resolved: 2020-10-16

## 2020-10-16 LAB
ABSOLUTE EOS #: 0.27 K/UL (ref 0–0.44)
ABSOLUTE IMMATURE GRANULOCYTE: 0.05 K/UL (ref 0–0.3)
ABSOLUTE LYMPH #: 1.84 K/UL (ref 1.1–3.7)
ABSOLUTE MONO #: 1.33 K/UL (ref 0.1–1.2)
ALBUMIN SERPL-MCNC: 3.8 G/DL (ref 3.5–5.2)
ANION GAP SERPL CALCULATED.3IONS-SCNC: 12 MMOL/L (ref 9–17)
BASOPHILS # BLD: 1 % (ref 0–2)
BASOPHILS ABSOLUTE: 0.06 K/UL (ref 0–0.2)
BUN BLDV-MCNC: 34 MG/DL (ref 8–23)
BUN/CREAT BLD: ABNORMAL (ref 9–20)
CALCIUM SERPL-MCNC: 9.1 MG/DL (ref 8.6–10.4)
CHLORIDE BLD-SCNC: 102 MMOL/L (ref 98–107)
CO2: 23 MMOL/L (ref 20–31)
CREAT SERPL-MCNC: 1.15 MG/DL (ref 0.7–1.2)
DIFFERENTIAL TYPE: ABNORMAL
EOSINOPHILS RELATIVE PERCENT: 3 % (ref 1–4)
GFR AFRICAN AMERICAN: >60 ML/MIN
GFR NON-AFRICAN AMERICAN: >60 ML/MIN
GFR SERPL CREATININE-BSD FRML MDRD: ABNORMAL ML/MIN/{1.73_M2}
GFR SERPL CREATININE-BSD FRML MDRD: ABNORMAL ML/MIN/{1.73_M2}
GLUCOSE BLD-MCNC: 110 MG/DL (ref 70–99)
HCT VFR BLD CALC: 44.7 % (ref 40.7–50.3)
HEMOGLOBIN: 14.5 G/DL (ref 13–17)
IMMATURE GRANULOCYTES: 1 %
INR BLD: 1.1
LYMPHOCYTES # BLD: 20 % (ref 24–43)
MCH RBC QN AUTO: 28.8 PG (ref 25.2–33.5)
MCHC RBC AUTO-ENTMCNC: 32.4 G/DL (ref 28.4–34.8)
MCV RBC AUTO: 88.7 FL (ref 82.6–102.9)
MONOCYTES # BLD: 14 % (ref 3–12)
NRBC AUTOMATED: 0 PER 100 WBC
PARTIAL THROMBOPLASTIN TIME: 54.6 SEC (ref 20.5–30.5)
PARTIAL THROMBOPLASTIN TIME: 64.7 SEC (ref 20.5–30.5)
PARTIAL THROMBOPLASTIN TIME: 83.3 SEC (ref 20.5–30.5)
PDW BLD-RTO: 12.6 % (ref 11.8–14.4)
PHOSPHORUS: 2.7 MG/DL (ref 2.5–4.5)
PLATELET # BLD: 255 K/UL (ref 138–453)
PLATELET ESTIMATE: ABNORMAL
PMV BLD AUTO: 11.1 FL (ref 8.1–13.5)
POTASSIUM SERPL-SCNC: 3.7 MMOL/L (ref 3.7–5.3)
PROTHROMBIN TIME: 11.5 SEC (ref 9–12)
RBC # BLD: 5.04 M/UL (ref 4.21–5.77)
RBC # BLD: ABNORMAL 10*6/UL
SEG NEUTROPHILS: 61 % (ref 36–65)
SEGMENTED NEUTROPHILS ABSOLUTE COUNT: 5.72 K/UL (ref 1.5–8.1)
SODIUM BLD-SCNC: 137 MMOL/L (ref 135–144)
WBC # BLD: 9.3 K/UL (ref 3.5–11.3)
WBC # BLD: ABNORMAL 10*3/UL

## 2020-10-16 PROCEDURE — 36415 COLL VENOUS BLD VENIPUNCTURE: CPT

## 2020-10-16 PROCEDURE — 6360000002 HC RX W HCPCS: Performed by: INTERNAL MEDICINE

## 2020-10-16 PROCEDURE — 85610 PROTHROMBIN TIME: CPT

## 2020-10-16 PROCEDURE — 80069 RENAL FUNCTION PANEL: CPT

## 2020-10-16 PROCEDURE — 85730 THROMBOPLASTIN TIME PARTIAL: CPT

## 2020-10-16 PROCEDURE — 99239 HOSP IP/OBS DSCHRG MGMT >30: CPT | Performed by: INTERNAL MEDICINE

## 2020-10-16 PROCEDURE — 6370000000 HC RX 637 (ALT 250 FOR IP): Performed by: STUDENT IN AN ORGANIZED HEALTH CARE EDUCATION/TRAINING PROGRAM

## 2020-10-16 PROCEDURE — 85025 COMPLETE CBC W/AUTO DIFF WBC: CPT

## 2020-10-16 PROCEDURE — 93926 LOWER EXTREMITY STUDY: CPT

## 2020-10-16 PROCEDURE — 2580000003 HC RX 258: Performed by: STUDENT IN AN ORGANIZED HEALTH CARE EDUCATION/TRAINING PROGRAM

## 2020-10-16 PROCEDURE — 6370000000 HC RX 637 (ALT 250 FOR IP): Performed by: NURSE PRACTITIONER

## 2020-10-16 PROCEDURE — 6360000002 HC RX W HCPCS: Performed by: STUDENT IN AN ORGANIZED HEALTH CARE EDUCATION/TRAINING PROGRAM

## 2020-10-16 RX ORDER — FUROSEMIDE 40 MG/1
40 TABLET ORAL DAILY
Status: DISCONTINUED | OUTPATIENT
Start: 2020-10-17 | End: 2020-10-16

## 2020-10-16 RX ORDER — FUROSEMIDE 20 MG/1
20 TABLET ORAL DAILY
Qty: 60 TABLET | Refills: 3 | Status: SHIPPED | OUTPATIENT
Start: 2020-10-17 | End: 2020-10-16

## 2020-10-16 RX ORDER — LORATADINE 10 MG/1
10 TABLET ORAL DAILY
Qty: 30 TABLET | Refills: 0 | Status: SHIPPED | OUTPATIENT
Start: 2020-10-16 | End: 2020-11-15

## 2020-10-16 RX ORDER — FUROSEMIDE 20 MG/1
20 TABLET ORAL DAILY
Status: DISCONTINUED | OUTPATIENT
Start: 2020-10-16 | End: 2020-10-16

## 2020-10-16 RX ORDER — METOPROLOL SUCCINATE 25 MG/1
12.5 TABLET, EXTENDED RELEASE ORAL NIGHTLY
Qty: 30 TABLET | Refills: 3 | Status: SHIPPED | OUTPATIENT
Start: 2020-10-17 | End: 2020-10-28 | Stop reason: SDUPTHER

## 2020-10-16 RX ORDER — WARFARIN SODIUM 5 MG/1
5 TABLET ORAL DAILY
Qty: 5 TABLET | Refills: 0 | Status: SHIPPED | OUTPATIENT
Start: 2020-10-16 | End: 2020-10-16 | Stop reason: SDUPTHER

## 2020-10-16 RX ORDER — FUROSEMIDE 20 MG/1
20 TABLET ORAL DAILY
Qty: 60 TABLET | Refills: 3 | Status: SHIPPED | OUTPATIENT
Start: 2020-10-17 | End: 2020-10-28 | Stop reason: SDUPTHER

## 2020-10-16 RX ORDER — ASPIRIN 81 MG/1
81 TABLET ORAL DAILY
Qty: 30 TABLET | Refills: 3 | Status: SHIPPED | OUTPATIENT
Start: 2020-10-17 | End: 2021-04-30 | Stop reason: ALTCHOICE

## 2020-10-16 RX ORDER — MIDODRINE HYDROCHLORIDE 2.5 MG/1
2.5 TABLET ORAL
Status: DISCONTINUED | OUTPATIENT
Start: 2020-10-16 | End: 2020-10-16 | Stop reason: HOSPADM

## 2020-10-16 RX ORDER — ATORVASTATIN CALCIUM 80 MG/1
80 TABLET, FILM COATED ORAL NIGHTLY
Qty: 30 TABLET | Refills: 3 | Status: SHIPPED | OUTPATIENT
Start: 2020-10-16 | End: 2020-10-28 | Stop reason: DRUGHIGH

## 2020-10-16 RX ORDER — WARFARIN SODIUM 7.5 MG/1
7.5 TABLET ORAL
Status: COMPLETED | OUTPATIENT
Start: 2020-10-16 | End: 2020-10-16

## 2020-10-16 RX ORDER — METOPROLOL SUCCINATE 25 MG/1
12.5 TABLET, EXTENDED RELEASE ORAL NIGHTLY
Qty: 30 TABLET | Refills: 3 | Status: SHIPPED | OUTPATIENT
Start: 2020-10-17 | End: 2020-10-16

## 2020-10-16 RX ORDER — WARFARIN SODIUM 5 MG/1
5 TABLET ORAL DAILY
Qty: 5 TABLET | Refills: 0 | Status: SHIPPED | OUTPATIENT
Start: 2020-10-16 | End: 2020-10-19 | Stop reason: SDUPTHER

## 2020-10-16 RX ORDER — FUROSEMIDE 20 MG/1
20 TABLET ORAL DAILY
Status: DISCONTINUED | OUTPATIENT
Start: 2020-10-17 | End: 2020-10-16 | Stop reason: HOSPADM

## 2020-10-16 RX ORDER — MIDODRINE HYDROCHLORIDE 5 MG/1
10 TABLET ORAL
Status: DISCONTINUED | OUTPATIENT
Start: 2020-10-16 | End: 2020-10-16

## 2020-10-16 RX ORDER — ATORVASTATIN CALCIUM 80 MG/1
80 TABLET, FILM COATED ORAL NIGHTLY
Qty: 30 TABLET | Refills: 3 | Status: SHIPPED | OUTPATIENT
Start: 2020-10-16 | End: 2020-10-16

## 2020-10-16 RX ORDER — MIDODRINE HYDROCHLORIDE 5 MG/1
5 TABLET ORAL ONCE
Status: COMPLETED | OUTPATIENT
Start: 2020-10-16 | End: 2020-10-16

## 2020-10-16 RX ORDER — MIDODRINE HYDROCHLORIDE 2.5 MG/1
2.5 TABLET ORAL
Qty: 90 TABLET | Refills: 3 | Status: SHIPPED | OUTPATIENT
Start: 2020-10-16 | End: 2020-10-16 | Stop reason: HOSPADM

## 2020-10-16 RX ORDER — METOPROLOL SUCCINATE 25 MG/1
12.5 TABLET, EXTENDED RELEASE ORAL NIGHTLY
Status: DISCONTINUED | OUTPATIENT
Start: 2020-10-17 | End: 2020-10-16 | Stop reason: HOSPADM

## 2020-10-16 RX ADMIN — MIDODRINE HYDROCHLORIDE 2.5 MG: 2.5 TABLET ORAL at 18:22

## 2020-10-16 RX ADMIN — MIDODRINE HYDROCHLORIDE 10 MG: 5 TABLET ORAL at 13:00

## 2020-10-16 RX ADMIN — Medication 10 ML: at 07:38

## 2020-10-16 RX ADMIN — MIDODRINE HYDROCHLORIDE 5 MG: 5 TABLET ORAL at 10:36

## 2020-10-16 RX ADMIN — MIDODRINE HYDROCHLORIDE 5 MG: 5 TABLET ORAL at 07:37

## 2020-10-16 RX ADMIN — FAMOTIDINE 20 MG: 20 TABLET, FILM COATED ORAL at 07:37

## 2020-10-16 RX ADMIN — FUROSEMIDE 40 MG: 10 INJECTION, SOLUTION INTRAMUSCULAR; INTRAVENOUS at 07:37

## 2020-10-16 RX ADMIN — ENOXAPARIN SODIUM 140 MG: 150 INJECTION SUBCUTANEOUS at 18:23

## 2020-10-16 RX ADMIN — HEPARIN SODIUM AND DEXTROSE 18 UNITS/KG/HR: 10000; 5 INJECTION INTRAVENOUS at 01:38

## 2020-10-16 RX ADMIN — Medication 81 MG: at 07:37

## 2020-10-16 RX ADMIN — SACUBITRIL AND VALSARTAN 1 TABLET: 24; 26 TABLET, FILM COATED ORAL at 08:44

## 2020-10-16 RX ADMIN — WARFARIN SODIUM 7.5 MG: 7.5 TABLET ORAL at 18:21

## 2020-10-16 NOTE — TELEPHONE ENCOUNTER
Gunjan Bhatti with [facility] called and wants to know if Dr. Jhonathan De La Rosa will follow patient for his coumadin. Gunjan Bhatti states patient will be started on coumadin and bridged with Lovenox. Please advise.      Gunjan Bhatti can be reached at [phone]

## 2020-10-16 NOTE — PROGRESS NOTES
Pharmacy Note  Warfarin Consult follow-up      Recent Labs     10/16/20  0640   INR 1.1     Recent Labs     10/14/20  1849 10/15/20  0424 10/16/20  0640   HGB 14.9 14.4 14.5   HCT 46.4 44.1 44.7    237 255       Significant Drug-Drug Interactions:  New warfarin drug-drug interactions: none  Discontinued drug-drug interactions: none  Current warfarin drug-drug interactions: acetaminophen, aspirin, heparin      Date INR Dose   10/14/2020 1.0 5mg   10/15/2020 1.0 5 mg    10/16/2020 1.1 7.5 mg       Notes: Will increase warfarin to 7.5 mg for today. Daily PT/INR while inpatient. [address] [place] [address]  Ph., CACP, Clinical Pharmacist  Anticoagulation Services, [facility]  10/16/2020  11:06 AM

## 2020-10-16 NOTE — PROGRESS NOTES
Metoprolol held this am for a BP of 94/64 and asymptomatic. Pt and wife still having a lot of questions about medications. Cards in room now explaining everything to pt and wife again. Was made aware by night RN that Razia White was held per order. PS fellow (Kalyan Triplett) they [facility] Board to be given this am.     Pt still on Heparin gtt. Last PTT was therapeutic and next PTT scheduled at 1pm today. Pt and wife still asking questions about meds and WCD. So writer printed, highlighted, and made notes on meds (warfarin, heparin, lasix, pepcid, midodrine, Lipitor, metoprolo, lovenox,, and entresto) and WCD and educated. Pt given discharge instructions. All questions answered. IV removed. All education complete. Pt walking out with wife.

## 2020-10-16 NOTE — PROGRESS NOTES
[facility]  Office: [address], DO, Scarlett Marita, DO, Hernesto Max, DO, Sana Abraham Blood, DO, Frederic Patrick MD, Fei Gonzalez MD, Shivani Lynch MD, Lorelei Ny MD, Aries Arndt MD, Kitty Garcia MD, Paige Santo MD, Nancy Asif MD, Vadim Figueroa MD, Shelbie Howard DO, Jennifer Perez MD, Ha Demarco MD, Angelina Lobato DO, Letitia Yuen MD,  Nathanial Apgar, DO, Arnie Carranza MD, Giacomo Law MD, Dee Cartagena, [facility], [facility], CNP, Mary Rinaldi, CNP, Ami Fraire, CNS, Yas Bloom, CNP, Alessio Irizarry, CNP, Lupe Leonard, CNP, Demi Ahn, CNP, Marcial Gipson, CNP, ANKITA FriedmanC, Arslan Lerner, [facility], Yariel Fleming, CNP, Alka Porter, CNP, Evelyn Aldrich, CNP, Dionne Bedoya, CNP, Ivania Bryan, [address] [place] [address]    Progress Note    10/16/2020    12:24 PM    Name:   Gonzalez Ryan  MRN:     [id number]     Acct:      [de-identified]   Room:      Day:  3  Admit Date:  10/13/2020 11:09 AM    PCP:   Tamika Saavedra MD  Code Status:  Full Code    Subjective:     C/C:   Chief Complaint   Patient presents with    Shortness of Breath     Pt transferred from [facility], scheduled for heart cath tomorrow, last two nights severe SOB that makes pt anxious     Interval History Status: improved. Long discussion with patient and his wife at bedside today. Patient states his blood pressure typically runs in the 98S systolic at baseline and denies any symptoms since his been here. Explained that although his blood pressure is on the lower end, that it is still important to continue his medications as long as he isnt symptomatic. Discussed Lovenox bridge to Coumadin and importance of outpatient follow up and daily INR checks. Currently pt is doing well. He denies any chest pain, SOB, difficulty breathing, nausea. Vomiting or diarrhea.         Review of Systems: Constitutional:  negative for chills, fevers, sweats  Respiratory:  negative for cough, dyspnea on exertion, shortness of breath, wheezing  Cardiovascular:  negative for chest pain, chest pressure/discomfort, lower extremity edema, palpitations  Gastrointestinal:  negative for abdominal pain, constipation, diarrhea, nausea, vomiting  Neurological:  negative for dizziness, headache    Medications: Allergies: Allergies   Allergen Reactions    Penicillins Other (See Comments)     As a child- unsure of reaction       Current Meds:   Scheduled Meds:    midodrine  10 mg Oral TID WC    warfarin  7.5 mg Oral Once    [START ON 10/17/2020] furosemide  40 mg Oral Daily    metoprolol succinate  25 mg Oral Daily    sacubitril-valsartan  1 tablet Oral BID    warfarin (COUMADIN) daily dosing (placeholder)   Other RX Placeholder    aspirin  81 mg Oral Daily    famotidine  20 mg Oral Daily    atorvastatin  80 mg Oral Nightly     Continuous Infusions:    heparin (PORCINE) Infusion 18 Units/kg/hr (10/16/20 0138)     PRN Meds: sodium chloride flush, acetaminophen, heparin (porcine), heparin (porcine), sodium chloride flush, potassium chloride **OR** potassium alternative oral replacement **OR** potassium chloride, magnesium sulfate, acetaminophen **OR** acetaminophen, magnesium hydroxide, promethazine **OR** ondansetron, nitroGLYCERIN    Data:     Past Medical History:   has a past medical history of Abnormal cardiovascular stress test, Adenocarcinoma (HCC), ENAMORADO (dyspnea on exertion), H/O echocardiogram, and Hyperlipidemia. Social History:   reports that he has never smoked. He has never used smokeless tobacco. He reports that he does not drink alcohol or use drugs. Family History:   Family History   Problem Relation Age of Onset    Heart Attack Father         Myocardial infarction in his 76s.     Diabetes Father     Glaucoma Mother        Vitals:  BP 94/64   Pulse 79   Temp 97.8 °F (36.6 °C) (Oral) Resp 16   Ht 6' 2\" (1.88 m)   Wt 204 lb 11.2 oz (92.9 kg)   SpO2 97%   BMI 26.28 kg/m²   Temp (24hrs), Av.9 °F (36.6 °C), Min:97.3 °F (36.3 °C), Max:98.4 °F (36.9 °C)    No results for input(s): POCGLU in the last 72 hours. I/O (24Hr):     Intake/Output Summary (Last 24 hours) at 10/16/2020 1224  Last data filed at 10/16/2020 1031  Gross per 24 hour   Intake 1047 ml   Output 2650 ml   Net -1603 ml       Labs:  Hematology:  Recent Labs     10/14/20  1849 10/15/20  0424 10/15/20  1138 10/16/20  0640   WBC 8.9 9.6  --  9.3   RBC 5.12 4.94  --  5.04   HGB 14.9 14.4  --  14.5   HCT 46.4 44.1  --  44.7   MCV 90.6 89.3  --  88.7   MCH 29.1 29.1  --  28.8   MCHC 32.1 32.7  --  32.4   RDW 12.4 12.5  --  12.6    237  --  255   MPV 10.9 10.6  --  11.1   INR 1.0  --  1.0 1.1     Chemistry:  Recent Labs     10/13/20  1616 10/13/20  2115 10/14/20  0107 10/14/20  1156 10/14/20  1849 10/15/20  0424 10/16/20  0640   NA  --   --   --  138  --  135 137   K  --   --   --  5.0  --  4.0 3.7   CL  --   --   --  103  --  102 102   CO2  --   --   --  22  --  22 23   GLUCOSE  --   --   --  93  --  110* 110*   BUN  --   --   --  30*  --  36* 34*   CREATININE  --   --   --  1.05  --  1.06 1.15   MG  --   --   --  2.2  --   --   --    ANIONGAP  --   --   --  13  --  11 12   LABGLOM  --   --   --  >60  --  >60 >60   GFRAA  --   --   --  >60  --  >60 >60   CALCIUM  --   --   --  9.1  --  8.9 9.1   PHOS  --   --   --   --   --  3.2 2.7   PROBNP  --   --   --   --  3,720*  --   --    TROPHS 23* 17 24*  --   --   --   --      Recent Labs     10/14/20  1156 10/15/20  0424 10/16/20  0640   PROT 6.9  --   --    LABALBU 3.8 3.7 3.8   AST 14  --   --    ALT 12  --   --    ALKPHOS 73  --   --    BILITOT 0.93  --   --      ABG:No results found for: POCPH, PHART, PH, POCPCO2, RBU5HVT, PCO2, POCPO2, PO2ART, PO2, POCHCO3, BPK0KPL, HCO3, NBEA, PBEA, BEART, BE, THGBART, THB, VXS0PQV, HYKI7AXY, P1LMWCKZ, O2SAT, FIO2  No results found for: SPECIAL  No results found for: CULTURE    Radiology:  Xr Chest Portable    Result Date: 10/13/2020  1. Bilateral lower lung multifocal ill-defined airspace disease. 2. Suspected mild bilateral pleural effusions. Physical Examination:        General appearance:  alert, cooperative and no distress  Mental Status:  oriented to person, place and time and normal affect  Lungs:  clear to auscultation bilaterally, normal effort  Heart:  regular rate and rhythm, no murmur  Abdomen:  soft, nontender, nondistended, normal bowel sounds, no masses, hepatomegaly, splenomegaly  Extremities:  no edema, redness, tenderness in the calves  Skin:  no gross lesions, rashes, induration    Assessment:        Hospital Problems           Last Modified POA    * (Principal) Acute systolic CHF (congestive heart failure) ([address] 10/15/2020 Yes    ENAMORADO (dyspnea on exertion) 10/13/2020 Yes    Abnormal stress test 10/13/2020 Yes    CECILIA (acute kidney injury) ([address] 10/13/2020 Yes    Bilateral pleural effusion 10/13/2020 Yes    Seasonal allergies 10/13/2020 Yes    Nonischemic cardiomyopathy ([address] 10/14/2020 Yes    Congestive heart failure ([address] 10/14/2020 Yes    Apical mural thrombus 10/15/2020 Yes          Plan:        1. Nonischemic cardiomyopathy: Cardiac catheterization shows mild coronary artery disease. Ejection fraction 24% on echocardiogram.  Continue beta-blocker, Entresto and oral Lasix. 2. Heart failure with reduced ejection fraction: Goal-directed medical therapy with Entresto, beta-blocker. Continue oral Lasix. 3. Apical thrombus on echocardiogram: Continue Coumadin. Will discharge home with Lovenox bridge and instructions to follow-up INR  4. Acute kidney injury: DC IV Lasix, transition to oral daily starting tomorrow  5. Hematoma: Check duplex ultrasound to rule out pseudoaneurysm  6. Mild coronary artery disease on cath: Continue aspirin and statin. Outpatient follow-up with cardiology  7.  Low blood pressure: Monitor blood pressure daily at home, should some patient becomes symptomatic recommend holding medication and getting medical evaluation  8. Allergic rhinitis: Continue Claritin and Flonase  9. Currently, patient is medically stable for discharge pending results of lower extremity duplex ultrasound. Patient will need outpatient follow-up with cardiology and primary care  10. Reviewed labs  11.      Trenton Held, DO  10/16/2020  12:24 PM

## 2020-10-16 NOTE — PROGRESS NOTES
Patient had small hematoma at rt groin cath site. Pressure applied. Cath site now soft. Continue to monitor.

## 2020-10-16 NOTE — DISCHARGE SUMMARY
October 14. Patient initially presented in respiratory distress. A chest x-ray was performed which showed mild bilateral pleural effusions. An echocardiogram was performed which showed an ejection fraction of 20 to 25% along with a 1.75 x 2.12 cm thrombus in the apex of the left ventricle. Cardiac cath was performed which showed mild atherosclerosis. Patient was evaluated by cardiology who started him on IV Lasix along with Entresto, and metoprolol 12.5 mg nightly. He was also given a wearable cardioverter defibrillator vest.  Patient was also started on Coumadin for treatment of his left ventricular thrombus. Patient's shortness of breath increased and he was transitioned to oral Lasix. Currently, patient is medically stable for discharge. He will need outpatient follow-up with cardiology for further management of his nonischemic cardiomyopathy. Patient will also need to follow-up in the [place] [facility] for INR monitoring. Patient will be bridged to Coumadin with Lovenox. I did go over medication with pt. Also scheduled to follow up with PCP/[place] [facility]. Significant therapeutic interventions: Iliac catheterization which showed mild irregularities in all cardiac arteries without significant stenosis or atherosclerosis.     Significant Diagnostic Studies:   Labs / Micro:  CBC:   Lab Results   Component Value Date    WBC 9.3 10/16/2020    RBC 5.04 10/16/2020    HGB 14.5 10/16/2020    HCT 44.7 10/16/2020    MCV 88.7 10/16/2020    MCH 28.8 10/16/2020    MCHC 32.4 10/16/2020    RDW 12.6 10/16/2020     10/16/2020     BMP:    Lab Results   Component Value Date    GLUCOSE 110 10/16/2020     10/16/2020    K 3.7 10/16/2020     10/16/2020    CO2 23 10/16/2020    ANIONGAP 12 10/16/2020    BUN 34 10/16/2020    CREATININE 1.15 10/16/2020    BUNCRER NOT REPORTED 10/16/2020    CALCIUM 9.1 10/16/2020    LABGLOM >60 10/16/2020    GFRAA >60 10/16/2020    GFR      10/16/2020    GFR NOT REPORTED 10/16/2020        Radiology:  Xr Chest Portable    Result Date: 10/13/2020  1. Bilateral lower lung multifocal ill-defined airspace disease. 2. Suspected mild bilateral pleural effusions. Consultations:    Consults:     Final Specialist Recommendations/Findings:   IP CONSULT TO CARDIOLOGY  IP CONSULT TO HOSPITALIST  IP CONSULT TO CARDIOLOGY  PHARMACY TO DOSE WARFARIN      The patient was seen and examined on day of discharge and this discharge summary is in conjunction with any daily progress note from day of discharge. Discharge plan:     Disposition: Home    Physician Follow Up: Karo Cruz MD  [address] [place] [address] [address]  [phone]    On 10/28/2020  at 9:15 f/u [facility] Life vest and LV thrombus on coumadin    [place] [facility]  [phone]  Call  [place] [facility]     Ale Aguilar MD  [facility]  [address] [place] [address] [phone]    In 1 week  Hospital follow up after treatment for new onset Non ischemic cardiomyopathy       Requiring Further Evaluation/Follow Up POST HOSPITALIZATION/Incidental Findings:     Diet: cardiac diet 1500 cc fluid restriction    Activity: As tolerated    Instructions to Patient: Follow-up with cardiology and Coumad[facility] clinic and [facility].       Discharge Medications:      Medication List      START taking these medications    aspirin 81 MG EC tablet  Take 1 tablet by mouth daily  Start taking on:  October 17, 2020     atorvastatin 80 MG tablet  Commonly known as:  LIPITOR  Take 1 tablet by mouth nightly     enoxaparin 120 MG/0.8ML injection  Commonly known as:  Lovenox  Inject 0.93 mLs into the skin daily     furosemide 20 MG tablet  Commonly known as:  LASIX  Take 1 tablet by mouth daily  Start taking on:  October 17, 2020     metoprolol succinate 25 MG extended release tablet  Commonly known as:  TOPROL XL  Take 0.5 tablets by mouth nightly  Start taking on:  October 17, 2020     sacubitril-valsartan 24-26 MG per tablet  Commonly known as:  ENTRESTO  Take 1 tablet by mouth 2 times daily     warfarin 5 MG tablet  Commonly known as:  Coumadin  Take 1 tablet by mouth daily for 5 days        CHANGE how you take these medications    loratadine 10 MG tablet  Commonly known as:  Claritin  Take 1 tablet by mouth daily  What changed:    · medication strength  · how much to take  · when to take this  · reasons to take this           Where to Get Your Medications      These medications were sent to [facility] [address] [facility], [address], [address] [address]    Phone:  [phone]   · atorvastatin 80 MG tablet  · enoxaparin 120 MG/0.8ML injection  · furosemide 20 MG tablet  · loratadine 10 MG tablet  · metoprolol succinate 25 MG extended release tablet  · sacubitril-valsartan 24-26 MG per tablet  · warfarin 5 MG tablet     These medications were sent to [place] [address] [address] [phone] [facility] [phone] [address] [facility] [address]    Phone:  [phone]   · aspirin 81 MG EC tablet         Discharge Procedure Orders   [facility]) Medication Mgmt (Anticoagulation) - [place]   Referral Priority: Routine Referral Type: Eval and Treat   Referral Reason: Specialty Services Required   Requested Specialty: Pharmacist   Number of Visits Requested: 1 Expiration Date: 10/16/22       Time Spent on discharge is  35 mins in patient examination, evaluation, counseling as well as medication reconciliation, prescriptions for required medications, discharge plan and follow up. Electronically signed by   Cari Munguia DO  10/16/2020  2:46 PM      Thank you Dr. Marko Beavers MD for the opportunity to be involved in this patient's care.

## 2020-10-16 NOTE — TELEPHONE ENCOUNTER
Spoke with arleen. The coumadin clinic here at [facility] is going to follow his inr's. Only will need Dr. Jordan Cordova authorization signature. Notified he would do that.

## 2020-10-16 NOTE — PROGRESS NOTES
with definity  Summary  Global left ventricular systolic function is severely reduced. Estimated  ejection fraction is 20-25 % . Calculated EF via heart model is 24 %. An echogenic structure measuring approximately 1.75 cm x 2.12 cm is seen in  the apex of the left ventricle and appears like a thrombus formation. Definity was used to optimize images revealing an apical thrombus. CATH 10/14/2020  Findings:        LMCA: Mild irregularities 10-20%.     LAD: Mild irregularities 10-20%.     LCx: Mild irregularities 10-20%.     RCA: Mild irregularities 10-20%.     Coronary Tree      Dominance: Right     LV Analysis  LV function assessed as:Abnormal.  Ejection Fraction: 15%        Conclusions:   Mild CAD. 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   Severely reduced LV systolic function. LVEDP 28 mm Hg.           Recommendations:      IV diuresis. Medical therapy for dilated cardiomyopathy.   Echo with Definity to R/O Apical clot.   Life vest as outpatient.   Reassess EF in 3 months for AICD placement. EKG: SR, anterolateral and inferior infarct     ECHO 10/8/2020: EF 20%, grade I DD, mild TR, RVSP is 50 mmHg, moderate PHTN.      STRESS 10/8/2020: Large inferior and inferoapical infarction. Minimal faiza-infarct ischemia. EF 15%.       Objective:   Vitals: BP 94/64   Pulse 79   Temp 97.7 °F (36.5 °C) (Oral)   Resp 16   Ht 6' 2\" (1.88 m)   Wt 205 lb 1.6 oz (93 kg)   SpO2 97%   BMI 26.33 kg/m²   General appearance: alert and cooperative with exam  HEENT: Head: Normocephalic, no lesions, without obvious abnormality. Neck:no JVD, trachea midline, no adenopathy  Lungs: Clear to auscultation  Heart: Regular rate and rhythm, s1/s2 auscultated, no murmurs  Abdomen: soft, non-tender, bowel sounds active  Extremities: no edema  Neurologic: not done  Right femoral artery site:  CDI  Without ecchymosis. Tender with mild swelling. Soft +pulse. Assessment / Acute Cardiac Problems:   1.  Dyspnea on

## 2020-10-16 NOTE — TELEPHONE ENCOUNTER
Please see in notes tab at 7:08am by Khadar Bethea       Apical thrombus on ECHO 10/14/2020  On Coumadin with heparin bridge until INR is theraputic.   Current INR 1.1

## 2020-10-16 NOTE — CARE COORDINATION
Transitional planning-called Dr. Yokasta Fabian office to see if he would follow patient for his coumadin.  Nurse will call back    1320 call from [address] at Dr. Saranya Solis office-will follow the coumadin at the [facility]in Clinic

## 2020-10-16 NOTE — PLAN OF CARE
Problem: Cardiac:  Goal: Ability to maintain an adequate cardiac output will improve  Description: Ability to maintain an adequate cardiac output will improve  10/16/2020 1546 by Mason Cotton RN  Outcome: Completed  10/16/2020 1546 by Mason Cotton RN  Outcome: Ongoing  Goal: Hemodynamic stability will improve  Description: Hemodynamic stability will improve  10/16/2020 1546 by Mason Cotton RN  Outcome: Completed  10/16/2020 1546 by Mason Cotton RN  Outcome: Ongoing

## 2020-10-17 ENCOUNTER — CARE COORDINATION (OUTPATIENT)
Dept: CARE COORDINATION | Age: 65
End: 2020-10-17

## 2020-10-17 NOTE — CARE COORDINATION
Gogo 45 Transitions Initial Follow Up Call    Call within 2 business days of discharge: Yes    Patient: Filipe Olivares Patient : 1955   MRN: <[id number]>  Reason for Admission: CHF  Discharge Date: 10/16/20 RARS: Readmission Risk Score: 14      Last Discharge [facility]       Complaint Diagnosis Description Type Department Provider    10/13/20 Shortness of Breath Congestive heart failure, unspecified HF chronicity, unspecified heart failure type ([address] . .. ED to Hosp-Admission (Discharged) (ADMITTED) [facility] BERT Blum, DO; Rafaela eBrumen . ..    10/13/20 Shortness of Breath Acute decompensated heart failure [facility]) ED (TRANSFER) [address] [facility] ED Nicholas Tate DO; David Michael. .. Spoke with: Wife, Khanh Portillo    Challenges to be reviewed by the provider   Additional needs identified to be addressed with provider No  medications-Reviewed and labs-Next Pt/INR    Discussed COVID-19 related testing which was available at this time. Test results were negative. Patient informed of results, if available? Yes         Method of communication with provider : none    Advance Care Planning:   Does patient have an Advance Directive:  not on file. Was this a readmission? No  Patient stated reason for admission: CHF Symptoms  Patients top risk factors for readmission: lack of knowledge about disease, medical condition, medication management and multiple health system providers    Care Transition Nurse (CTN) contacted the family by telephone to perform post hospital discharge assessment. Verified name and  with family as identifiers. Provided introduction to self, and explanation of the CTN role. CTN reviewed discharge instructions, medical action plan and red flags with   family who verbalized understanding. Family given an opportunity to ask questions and does not have any further questions or concerns at this time. Were discharge instructions available to patient?  Yes. Reviewed appropriate site of care based on symptoms and resources available to patient including: PCP, Specialist and When to call 911. The family agrees to contact the PCP office for questions related to their healthcare. Medication reconciliation was performed with family, who verbalizes understanding of administration of home medications. Advised obtaining a 90-day supply of all daily and as-needed medications. Covid Risk Education    Patient is following risk factors of: heart failure. Education provided regarding infection prevention, and signs and symptoms of COVID-19 and when to seek medical attention with family who verbalized understanding. Patient/family/caregiver given information for [facility] and agrees to enroll no  Patient's preferred e-mail: declines  Patient's preferred phone number: declines    Discussed follow-up appointments. If no appointment was previously scheduled, appointment scheduling offered: Yes. Is follow up appointment scheduled within 7 days of discharge? No  Non-[facility] follow up appointment(s): Wife States they will call Monday for PCP F/U appointment. Has Cardio appointment 10/28/2020    Plan for follow-up call in 3-5 days based on severity of symptoms and risk factors. Plan for next call: symptom management-CHF and follow up appointment-NEEDS PCP APPOINTMENT     & CHECK PT/INR   CTN provided contact information for future needs. Called and spoke with patient's wife. She states, \"He just doesn't listen\". Patient took PM medications this morning. (He took coumadin this AM instead of this evening, all others the same). Instructed wife to continue to give lovenox injection this evening and then tomorrow give as directed. Explained lovenox and importance of patient taking coumadin also. Next PT/INR 10/20/2020. Patient has all medications is taking medications as directed and has no questions concerning medications at this time.  Received Meds to Beds yesterday before discharge. 1111F sent to PCP. Patient is wearing Life Vest continuously (take off only for shower). BP is still low but no dizziness or fainting. BP this AM was 125/74 - getting better. Appetite good, drinking adequate fluids - reviewed fluid restriction 48 -64 oz of \"all\" fluids. Patient's wife expresses understanding. . Normal elimination patterns (BM & Urination). No weight gain, SOB or wheezing, coughing, edema, abdominal edema, adhering to low NA diet and fluid restriction. Reviewed Low NA diet and fluid restrictions. Explained the Transition to Home program to patient and that they are followed for 30 days after discharge. Patient expresses understanding. Patient & wife know when to contact physician or report to ED with worsening or severe symptoms, changes, or concerns. Will Follow up at later time.      SILVIA Velarde [facility] [address] [place] [address] Transitions 24 Hour Call    Schedule Follow Up Appointment with PCP:  Completed  Do you have any ongoing symptoms?:  No  Do you have a copy of your discharge instructions?:  Yes  Do you have all of your prescriptions and are they filled?:  Yes  Have you been contacted by a [facility] Pharmacist?:  No  Have you scheduled your follow up appointment?:  Yes  Were you discharged with any Home Care or Post Acute Services:  No  Do you feel like you have everything you need to keep you well at home?:  Yes  Care Transitions Interventions  No Identified Needs         Follow Up  Future Appointments   Date Time Provider Noah Tran   10/28/2020  9:15 AM MD AARTI Álvarez   11/24/2020 10:30 AM MD TOM Ball   12/11/2020 10:15 AM MD AARTI Garay LPN

## 2020-10-19 ENCOUNTER — HOSPITAL ENCOUNTER (OUTPATIENT)
Dept: PHARMACY | Age: 65
Setting detail: THERAPIES SERIES
Discharge: HOME | End: 2020-10-19
Payer: MEDICARE

## 2020-10-19 PROBLEM — I51.3 LEFT VENTRICULAR THROMBUS: Status: ACTIVE | Noted: 2020-10-19

## 2020-10-19 LAB
INR BLD: 1.5
PROTIME: 18.4 SECONDS

## 2020-10-19 PROCEDURE — 85610 PROTHROMBIN TIME: CPT

## 2020-10-19 PROCEDURE — 99211 OFF/OP EST MAY X REQ PHY/QHP: CPT

## 2020-10-19 PROCEDURE — 36416 COLLJ CAPILLARY BLOOD SPEC: CPT

## 2020-10-19 RX ORDER — WARFARIN SODIUM 5 MG/1
5 TABLET ORAL DAILY
Qty: 45 TABLET | Refills: 3 | Status: SHIPPED | OUTPATIENT
Start: 2020-10-19 | End: 2021-02-17 | Stop reason: SDUPTHER

## 2020-10-19 NOTE — PROGRESS NOTES
ANTICOAGULATION SERVICE    Date of Clinic Visit:  10/19/2020    Briana Dang is a 72 y.o. male who presents to clinic today for anticoagulation monitoring and adjustment. Temp 97  Recent INR Results:  Internal QC passed  Lab Results   Component Value Date    INR 1.5 10/19/2020    INR 1.1 10/16/2020       Current Warfarin Dosage:  Dosing Plan  As of 10/19/2020    TTR:   --   Full warfarin instructions:   10/19: 5 mg; Otherwise 7.5 mg every Tue, Thu; 5 mg all other days               Assessment/Plan:    Modify warfarin dose as noted above: Patient is a new start on warfarin. He is still bridging with lovenox and we will continue with that until next visit on Wednesday. Increased dose and called more warfarin in to pharmacy as only 5 days were on original script. Next Clinic Appointment:  Return date  As of 10/19/2020    TTR:   --   Next INR check:   10/21/2020             Please call [facility]ticoagulation Clinic at [phone] with any questions. Thanks!   Susi Pineda [facility]  Anticoagulation Service Pharmacist  10/19/2020 11:10 AM  CLINICAL PHARMACY CONSULT: MED RECONCILIATION/REVIEW ADDENDUM    For Pharmacy Admin Tracking Only    PHSO: Yes  Total # of Interventions Recommended: 1  - Increased Dose #: 1  Total Interventions Accepted: 1  Time Spent (min): 25    Ab Butler 219

## 2020-10-20 ENCOUNTER — TELEPHONE (OUTPATIENT)
Dept: INTERNAL MEDICINE | Age: 65
End: 2020-10-20

## 2020-10-20 NOTE — TELEPHONE ENCOUNTER
Offer patient appointment here within 1 week after cardiology appointment, to make sure patient has had an adequate understanding of results and recommendations, as well as to answer questions, and make sure he has adequate prescription supply of medications.

## 2020-10-20 NOTE — TELEPHONE ENCOUNTER
Called patient, he would prefer to not have an earlier appt. Did notify patient that he can call us if any questions come up. Voiced understanding.

## 2020-10-21 ENCOUNTER — HOSPITAL ENCOUNTER (OUTPATIENT)
Dept: PHARMACY | Age: 65
Setting detail: THERAPIES SERIES
Discharge: HOME | End: 2020-10-21
Payer: MEDICARE

## 2020-10-21 LAB
INR BLD: 1.6
PROTIME: 19.5 SECONDS

## 2020-10-21 PROCEDURE — 36416 COLLJ CAPILLARY BLOOD SPEC: CPT

## 2020-10-21 PROCEDURE — 99211 OFF/OP EST MAY X REQ PHY/QHP: CPT

## 2020-10-21 PROCEDURE — 85610 PROTHROMBIN TIME: CPT

## 2020-10-21 NOTE — PROGRESS NOTES
ANTICOAGULATION SERVICE    Date of Clinic Visit:  10/21/2020    Agnes Duran is a 72 y.o. male who presents to clinic today for anticoagulation monitoring and adjustment. Recent INR Results:  Internal QC passed  Lab Results   Component Value Date    INR 1.6 10/21/2020    INR 1.5 10/19/2020       Current Warfarin Dosage:  Dosing Plan  As of 10/21/2020    TTR:   --   Full warfarin instructions:   10/21: 10 mg; Otherwise 7.5 mg every Tue, Thu; 5 mg all other days               Assessment/Plan:    Modify warfarin dose as noted above: Patient still below target. Will increase dose and recheck Friday. Order sent to [place] for 4 more days worth of lovenox. Next Clinic Appointment:  Return date  As of 10/21/2020    TTR:   --   Next INR check:   10/23/2020             Please call [facility] Anticoagulation Clinic at [phone] with any questions. Thanks!   Kev Pickard, [address] [facility]  Anticoagulation Service Pharmacist  10/21/2020 10:27 AM  CLINICAL PHARMACY CONSULT: MED RECONCILIATION/REVIEW ADDENDUM    For Pharmacy Admin Tracking Only    PHSO: Yes  Total # of Interventions Recommended: 1  - Increased Dose #: 1  Time Spent (min): Ab Kamara 219

## 2020-10-23 ENCOUNTER — NURSE ONLY (OUTPATIENT)
Dept: CARDIOLOGY | Age: 65
End: 2020-10-23
Payer: COMMERCIAL

## 2020-10-23 ENCOUNTER — HOSPITAL ENCOUNTER (OUTPATIENT)
Dept: PHARMACY | Age: 65
Setting detail: THERAPIES SERIES
Discharge: HOME | End: 2020-10-23
Payer: MEDICARE

## 2020-10-23 LAB
INR BLD: 2.3
PROTIME: 27.1 SECONDS

## 2020-10-23 PROCEDURE — 99211 OFF/OP EST MAY X REQ PHY/QHP: CPT

## 2020-10-23 PROCEDURE — 99999 PR OFFICE/OUTPT VISIT,PROCEDURE ONLY: CPT | Performed by: INTERNAL MEDICINE

## 2020-10-23 PROCEDURE — 85610 PROTHROMBIN TIME: CPT

## 2020-10-23 PROCEDURE — 36416 COLLJ CAPILLARY BLOOD SPEC: CPT

## 2020-10-23 NOTE — PROGRESS NOTES
ANTICOAGULATION SERVICE    Date of Clinic Visit:  10/23/2020    Joshua Johnson is a 72 y.o. male who presents to clinic today for anticoagulation monitoring and adjustment. Temp = 97.1    Recent INR Results:  Internal QC passed  Lab Results   Component Value Date    INR 2.3 10/23/2020    INR 1.6 10/21/2020       Current Warfarin Dosage:  Dosing Plan  As of 10/23/2020    TTR:   --   Full warfarin instructions:   10/24: 7.5 mg; 10/25: 7.5 mg; Otherwise 7.5 mg every Tue, Thu; 5 mg all other days               Assessment/Plan:    Modify warfarin dose as noted above: New start. INR is in range today. Instructed Salvador to continue lovenox yet today then can stop. Recheck 3 days. Next Clinic Appointment:  Return date  As of 10/23/2020    TTR:   --   Next INR check:   10/26/2020             Please call [facility] Anticoagulation Clinic at [phone] with any questions. Thanks! Logan Hugo, [address] [facility]  Anticoagulation Service Pharmacist  10/23/2020 8:26 AM     CLINICAL PHARMACY CONSULT: MED RECONCILIATION/REVIEW ADDENDUM    For Pharmacy Admin Tracking Only    PHSO: Yes  Total # of Interventions Recommended: 1  - Decreased Dose #: 1  - Maintenance Safety Lab Monitoring #: 1  Recommended intervention potential cost savings:   Accepted intervention potential cost savings:    Total Interventions Accepted: 1  Time Spent (min): Gilda

## 2020-10-26 ENCOUNTER — HOSPITAL ENCOUNTER (OUTPATIENT)
Dept: PHARMACY | Age: 65
Setting detail: THERAPIES SERIES
Discharge: HOME | End: 2020-10-26
Payer: MEDICARE

## 2020-10-26 LAB
INR BLD: 2.7
PROTIME: 32.3 SECONDS

## 2020-10-26 PROCEDURE — 99211 OFF/OP EST MAY X REQ PHY/QHP: CPT

## 2020-10-26 PROCEDURE — 36416 COLLJ CAPILLARY BLOOD SPEC: CPT

## 2020-10-26 PROCEDURE — 85610 PROTHROMBIN TIME: CPT

## 2020-10-26 NOTE — PROGRESS NOTES
ANTICOAGULATION SERVICE    Date of Clinic Visit:  10/26/2020    Filipe Olivares is a 72 y.o. male who presents to clinic today for anticoagulation monitoring and adjustment. Temp 96.8  Recent INR Results:  Internal QC passed  Lab Results   Component Value Date    INR 2.7 10/26/2020    INR 2.3 10/23/2020       Current Warfarin Dosage:  Dosing Plan  As of 10/26/2020    TTR:   --   Full warfarin instructions:   7.5 mg every Tue, Thu; 5 mg all other days               Assessment/Plan:    Continue current regimen as INR remains stable. Patient is a new start will continue with intended dose plan. Next Clinic Appointment:  Return date  As of 10/26/2020    TTR:   --   Next INR check:   10/30/2020             Please call [facility] Anticoagulation Clinic at [phone] with any questions. Thanks!   Jannette Canseco [facility]  Anticoagulation Service Pharmacist  10/26/2020 8:19 AM  CLINICAL PHARMACY CONSULT: MED RECONCILIATION/REVIEW ADDENDUM    For Pharmacy Admin Tracking Only    PHSO: Yes  Total # of Interventions Recommended: 0  Total Interventions Accepted: 0  Time Spent (min): [address]

## 2020-10-27 ENCOUNTER — CARE COORDINATION (OUTPATIENT)
Dept: CASE MANAGEMENT | Age: 65
End: 2020-10-27

## 2020-10-27 NOTE — CARE COORDINATION
Gogo 45 Transitions (PHP) Follow Up Call  10/27/2020    Patient: Sol Ontiveros  Patient : 1955   MRN: [id number]    Reason for Admission: Nonischemic CM, CHF, LVD - Life vest, LV thrombus  Discharge Date: 10/16/20 RARS: Readmission Risk Score: 14    Covid testing negative 10/13  Covid risk factor: CHF    Spoke with: patient's wife - patient was out hunting with a bow & arrow - wearing life vest.  She says he is \"doing really well. \"  Spouse is worried though that he is doing too much activity wise. She stresses that he has never been sick, so these restrictions are hard for him. Encouraged to discuss all of this at cardio appt tomorrow. Patient is still driving as well, so she plans to address this. She said that patient called PCP office & was told by staff that as long as he was following up with cardiology, he did not need PCP appt at this time. Spouse she spoke with office also & was referred to the cardio office about the patient's hematoma \"lump\" on right groin which is improving now. He was seen by cardio on 10/23 & has another appt with cardio tomorrow, 10/28. Completed lovenox injections last week & currently regulated on coumadin for LV thrombus. Spouse given CTN contact number & will continue to follow. Care Transitions Subsequent and Final Call    Schedule Follow Up Appointment with PCP:  Completed  Subsequent and Final Calls  Do you have any ongoing symptoms?:  No  Have your medications changed?:  No  Do you have any questions related to your medications?:  No  Do you currently have any active services?:  No  Do you have any needs or concerns that I can assist you with?:  Yes (Comment: spouse going to appt with patient tomorrow to check about activity restrictions, life vest)  Care Transitions Interventions  Other Interventions:             Follow Up  Future Appointments   Date Time Provider Noah Tran   10/28/2020  9:15 AM MD Ayush Glover [facility]   10/30/2020  8:00 AM NIKOLAS MEDICATION [facility] [place]   2020 10:30 AM MD TOM Posadas [facility]   2020 10:15 AM MD AARTI Pickens [facility]       Needs to be reviewed by the provider   Additional needs identified to be addressed with provider No  TBD  Discussed COVID-19 related testing which was available at this time. Test results were negative 10/13. Patient informed of results, if available? Yes         Method of communication with provider : none    Care Transition Nurse (CTN) contacted the family/SPOUSE by telephone to follow up after admission on 10/13. Verified name and  with family as identifiers. Addressed changes since last contact: symptom management-reassessed, self management-restrictions, follow up appointment-plan tomorrow and medication management-reviewed & inquiring about cost/coverage  Discharged needs reviewed: reviewed  Follow up appointment completed? Yes    Advance Care Planning:   Does patient have an Advance Directive:  reviewed and current. CTN reviewed discharge instructions, medical action plan and red flags with family and discussed any barriers to care and/or understanding of plan of care after discharge. Discussed appropriate site of care based on symptoms and resources available to patient including: PCP and Specialist. The patient agrees to contact the PCP office for questions related to their healthcare. Patients top risk factors for readmission: lack of knowledge about disease, medical condition and medication management  Interventions to address risk factors: Scheduled appointment with PCP-was told by office that appt was not needed as long as he followed with cardio, Scheduled appointment with Specialist-cardio 10/23 & 10/28 and Obtained and reviewed discharge summary and/or continuity of care documents    Discussed follow-up appointments.  If no appointment was previously scheduled, appointment scheduling offered: PCP office told patient not needed if cardio follows up. Is follow up appointment scheduled within 7 days of discharge? No  [facility] follow up appointment(s): cardio 10/23, 10/28    Plan for follow-up call in 5-7 days based on severity of symptoms and risk factors. Plan for next call: follow up appointment-review cardio appt from 10/28  CTN provided contact information for future needs.       Adriana Briceño RN

## 2020-10-28 ENCOUNTER — OFFICE VISIT (OUTPATIENT)
Dept: CARDIOLOGY | Age: 65
End: 2020-10-28
Payer: MEDICARE

## 2020-10-28 VITALS
BODY MASS INDEX: 25.93 KG/M2 | WEIGHT: 202 LBS | HEART RATE: 72 BPM | DIASTOLIC BLOOD PRESSURE: 58 MMHG | HEIGHT: 74 IN | SYSTOLIC BLOOD PRESSURE: 88 MMHG

## 2020-10-28 PROCEDURE — 4040F PNEUMOC VAC/ADMIN/RCVD: CPT | Performed by: INTERNAL MEDICINE

## 2020-10-28 PROCEDURE — G8417 CALC BMI ABV UP PARAM F/U: HCPCS | Performed by: INTERNAL MEDICINE

## 2020-10-28 PROCEDURE — 99214 OFFICE O/P EST MOD 30 MIN: CPT | Performed by: INTERNAL MEDICINE

## 2020-10-28 PROCEDURE — G8427 DOCREV CUR MEDS BY ELIG CLIN: HCPCS | Performed by: INTERNAL MEDICINE

## 2020-10-28 PROCEDURE — 3017F COLORECTAL CA SCREEN DOC REV: CPT | Performed by: INTERNAL MEDICINE

## 2020-10-28 PROCEDURE — 1036F TOBACCO NON-USER: CPT | Performed by: INTERNAL MEDICINE

## 2020-10-28 PROCEDURE — 1111F DSCHRG MED/CURRENT MED MERGE: CPT | Performed by: INTERNAL MEDICINE

## 2020-10-28 PROCEDURE — G8482 FLU IMMUNIZE ORDER/ADMIN: HCPCS | Performed by: INTERNAL MEDICINE

## 2020-10-28 PROCEDURE — 1123F ACP DISCUSS/DSCN MKR DOCD: CPT | Performed by: INTERNAL MEDICINE

## 2020-10-28 RX ORDER — ATORVASTATIN CALCIUM 20 MG/1
20 TABLET, FILM COATED ORAL DAILY
Qty: 90 TABLET | Refills: 3 | Status: SHIPPED | OUTPATIENT
Start: 2020-10-28 | End: 2021-09-20

## 2020-10-28 RX ORDER — ATORVASTATIN CALCIUM 20 MG/1
20 TABLET, FILM COATED ORAL DAILY
COMMUNITY
End: 2020-10-28 | Stop reason: SDUPTHER

## 2020-10-28 RX ORDER — FUROSEMIDE 20 MG/1
20 TABLET ORAL DAILY
Qty: 90 TABLET | Refills: 3 | Status: SHIPPED | OUTPATIENT
Start: 2020-10-28 | End: 2021-07-30 | Stop reason: SDUPTHER

## 2020-10-28 RX ORDER — METOPROLOL SUCCINATE 25 MG/1
12.5 TABLET, EXTENDED RELEASE ORAL NIGHTLY
Qty: 45 TABLET | Refills: 3 | Status: SHIPPED | OUTPATIENT
Start: 2020-10-28 | End: 2021-07-30 | Stop reason: SDUPTHER

## 2020-10-28 RX ORDER — ATORVASTATIN CALCIUM 80 MG/1
80 TABLET, FILM COATED ORAL NIGHTLY
Qty: 90 TABLET | Refills: 3 | Status: CANCELLED | OUTPATIENT
Start: 2020-10-28

## 2020-10-28 NOTE — PROGRESS NOTES
Today's Date: 10/28/2020  Patient's Name: Seda Kruse  Patient's age: 72 y. o., 1955    Subjective: The patient is a 72y.o. year old who is here for posthospitalization follow-up. He was admitted earlier this month with acute systolic congestive heart failure. Echocardiogram showed severely reduced LV systolic function with EF 15% and LV apical thrombus. He was diuresed with IV Lasix. Cardiac catheterization showed mild nonobstructive disease. Was discharged on therapeutic anticoagulation with warfarin and on a LifeVest.    Today, he reports that he is feeling much better. He denies any chest pain. No significant dyspnea on exertion. He is able to ambulate and walk around half to 1 mile every day without any significant problem. No orthopnea, lower extremity edema. His blood pressure always runs low and he is currently asymptomatic without any dizziness or lightheadedness. He had multiple questions regarding his upcoming trip to [place]. Past Medical History:   has a past medical history of Abnormal cardiovascular stress test, Adenocarcinoma ([facility] [address] ENAMORADO (dyspnea on exertion), H/O echocardiogram, and Hyperlipidemia. Past Surgical History:   has a past surgical history that includes Colonoscopy (10/27/2010); Mouth surgery (01/2007); and Cardiac catheterization (10/14/2020). Home Medications:  Prior to Admission medications    Medication Sig Start Date End Date Taking? Authorizing Provider   enoxaparin (LOVENOX) 120 MG/0.8ML injection Inject 0.93 mLs into the skin daily for 9 days  Patient not taking: Reported on 10/26/2020 10/21/20 10/30/20  Ry Figueroa, DO   warfarin (COUMADIN) 5 MG tablet Take 1 tablet by mouth daily Take 1.5 tablets (7.5 mg) by mouth on Tuesday and Thursday. Take 1 tablet ( 5 mg) by mouth MWFSS. Or as directed by INR results.  10/19/20 4/17/21  Ry Figueroa, DO   aspirin 81 MG EC tablet Take 1 tablet by mouth daily 10/17/20   [facility] I Oswaldo, DO   furosemide (LASIX) 20 MG tablet Take 1 tablet by mouth daily 10/17/20   Omkar Dawley, DO   atorvastatin (LIPITOR) 80 MG tablet Take 1 tablet by mouth nightly 10/16/20   Omkar Dawley, DO   metoprolol succinate (TOPROL XL) 25 MG extended release tablet Take 0.5 tablets by mouth nightly 10/17/20   Omkar Dawley, DO   sacubitril-valsartan (ENTRESTO) 24-26 MG per tablet Take 1 tablet by mouth 2 times daily 10/16/20   Omkar Dawley, DO   loratadine (CLARITIN) 10 MG tablet Take 1 tablet by mouth daily 10/16/20 11/15/20  Omkar Dawley, DO       Allergies:  Penicillins    Social History:   reports that he has never smoked. He has never used smokeless tobacco. He reports that he does not drink alcohol or use drugs. Review of Systems:  · Constitutional: there has been no unanticipated weight loss. There's been No change in energy level, No change in activity level. · Eyes: No visual changes or diplopia. No scleral icterus. · ENT: No Headaches, hearing loss or vertigo. No mouth sores or sore throat. · Cardiovascular: As described in HPI. · Respiratory: AS HPI  · Gastrointestinal: No abdominal pain, appetite loss, blood in stools. No change in bowel or bladder habits. · Genitourinary: No dysuria, trouble voiding, or hematuria. · Musculoskeletal:  No gait disturbance, No weakness or joint complaints. · Integumentary: No rash or pruritis. · Neurological: No headache, diplopia, change in muscle strength, numbness or tingling  · Psychiatric: No new anxiety or depression. · Endocrine: No temperature intolerance. No excessive thirst, fluid intake, or urination. No tremor. · Hematologic/Lymphatic: No abnormal bruising or bleeding, blood clots or swollen lymph nodes. · Allergic/Immunologic: No nasal congestion or hives.           Physical Exam:  Vitals:    10/28/20 0911   BP: (!) 88/58   Pulse: 72     General appearance: alert, oriented and cooperative with exam  HEENT: Head: Normocephalic, no lesions, without obvious abnormality. Neck: no carotid bruit, no JVD  Lungs: clear to auscultation bilaterally without any wheezing or rales   Heart: regular rate and rhythm, S1, S2 normal, no murmur, click, rub or gallop  Abdomen: soft, non-tender; bowel sounds normal; no masses,  no organomegaly  Extremities: extremities normal, atraumatic, no cyanosis or edema  Neurologic: Mental status: Alert, oriented, thought content appropriate      Cardiac Data:      2 d echo 10/14/2020  Global left ventricular systolic function is severely reduced. Estimated  ejection fraction is 20-25 % . Calculated EF via heart model is 24 %. An echogenic structure measuring approximately 1.75 cm x 2.12 cm is seen in the apex of the left ventricle and appears like a thrombus formation. Definity was used to optimize images revealing an apical thrombus.     Cardiac cath 10/14/2020  LMCA: Mild irregularities 10-20%. LAD: Mild irregularities 10-20%  LCx: Mild irregularities 10-20%. RCA: Mild irregularities 10-20%. EF 15%    Labs:     CBC: No results for input(s): WBC, HGB, HCT, PLT in the last 72 hours. BMP:No results for input(s): NA, K, CO2, BUN, CREATININE, LABGLOM, GLUCOSE in the last 72 hours.   PT/INR:   Recent Labs     10/26/20   PROTIME 32.3   INR 2.7     FASTING LIPID PANEL:  Lab Results   Component Value Date    HDL 42 09/21/2020    TRIG 145 09/21/2020       IMPRESSION:    Patient Active Problem List   Diagnosis    Hyperlipidemia    Lipoma of torso    ENAMORADO (dyspnea on exertion)    Acute systolic CHF (congestive heart failure) (HCC)    Chest pain    Seasonal allergies    Nonischemic cardiomyopathy [address] [address]    Congestive heart failure (HCC)    Apical mural thrombus    Left ventricular thrombus       Assessment:  · HFrEF secondary to non-ischemic CMP (LVEF ~ 15%)  · LV apical mural thrombus  · Mild nonobstructive coronary artery disease      Plan:  Continue medical therapy withToprol-XL 12.5 daily and Entresto 24/26 mg twice daily. Patient's blood pressure always runs low and he is currently asymptomatic, will continue to monitor  Decrease Lasix to 20 mg every other day  Decrease Lipitor to 20 mg daily  Continue low-dose aspirin  Continue echocardiogram in 3 months to reassess LVEF and apical thrombus. Continue LifeVest and anticoagulation with warfarin in the meantime. Detailed discussion with patient and his wife regarding further management. If LVEF is persistently reduced less than 35% on repeat echo, will require permanent defibrillator/AICD. Counseled in detail regarding fluid and salt restriction, medication adherence and symptoms of acute decompensated CHF in which she will need to seek emergent medical attention otherwise we will see him back in 3 months for routine follow-up.       Trudy King [address] Cardiology Consult           [phone]

## 2020-10-30 ENCOUNTER — HOSPITAL ENCOUNTER (OUTPATIENT)
Dept: PHARMACY | Age: 65
Setting detail: THERAPIES SERIES
Discharge: HOME | End: 2020-10-30
Payer: MEDICARE

## 2020-10-30 LAB
INR BLD: 2.1
PROTIME: 25.1 SECONDS

## 2020-10-30 PROCEDURE — 85610 PROTHROMBIN TIME: CPT

## 2020-10-30 PROCEDURE — 36416 COLLJ CAPILLARY BLOOD SPEC: CPT

## 2020-10-30 PROCEDURE — 99211 OFF/OP EST MAY X REQ PHY/QHP: CPT

## 2020-10-30 NOTE — PROGRESS NOTES
,  ANTICOAGULATION SERVICE    Date of Clinic Visit:  10/30/2020    Silvia Cordoba is a 72 y.o. male who presents to clinic today for anticoagulation monitoring and adjustment. Recent INR Results:  Internal QC passed  Lab Results   Component Value Date    INR 2.1 10/30/2020    INR 2.7 10/26/2020       Current Warfarin Dosage:  Dosing Plan  As of 10/30/2020    TTR:   100.0 % (1 d)   Full warfarin instructions:   10/30: 7.5 mg; Otherwise 5 mg every Mon, Wed, Fri; 7.5 mg all other days               Assessment/Plan:    Modify warfarin dose as noted above: Patient INR fell so increase a bit and recheck 1 week. Next Clinic Appointment:  Return date  As of 10/30/2020    TTR:   100.0 % (1 d)   Next INR check:   11/5/2020             Please call [facility] at [phone] with any questions. Thanks!   Soni Ramos [facility]  Anticoagulation Service Pharmacist  10/30/2020 8:48 AM  CLINICAL PHARMACY CONSULT: MED RECONCILIATION/REVIEW ADDENDUM    For Pharmacy Admin Tracking Only    PHSO: Yes  Total # of Interventions Recommended: 1  - Increased Dose #: 1  Total Interventions Accepted: 1  Time Spent (min): Ab Kamara 219

## 2020-11-02 ENCOUNTER — CARE COORDINATION (OUTPATIENT)
Dept: CASE MANAGEMENT | Age: 65
End: 2020-11-02

## 2020-11-02 NOTE — CARE COORDINATION
Gogo [facility] Follow Up Call  2020    Patient: Cece Chao  Patient : 1955   MRN: [id number]    Reason for Admission: Nonischemic CM, CHF, LVD - Life vest, LV thrombus  Discharge Date: 10/16/20 RARS: Readmission Risk Score: 14    Covid testing negative 10/13  Covid risk factor: CHF    Spoke with: spouse with patient in background. Reports that patient is doing well - quite active & tolerating activity - denies SOB. Was seen at recent cardio appt - lasix + lipitor decreased. Instructed to continue with daily weights - especially since lasix was decreased to every other day because of BP on lower side. No swelling - so far weight stable. Cardiac Rehab referral obtained at cardio appt. Patient is still wearing life vest - will have repeat echo in December before he goes to [facility]. Continues entresto - cost for 3 months is $400 which spouse says they can afford, but hopes to be able to get it cheaper if possible. Denies any further issues with groin/cath site - lump has resolved. Needs to be reviewed by the provider    Additional needs identified to be addressed with provider No  TBD  Discussed COVID-19 related testing which was available at this time. Test results were negative 10/13. Patient informed of results, if available? Yes           Method of communication with provider : none     Care Transition Nurse (CTN) contacted patient/SPOUSE by telephone to follow up after admission on 10/13. Verified name and  with family as identifiers.     Addressed changes since last contact: symptom management-reassessed, self management-restrictions, follow up appointment-plan tomorrow and medication management-reviewed & inquiring about cost/coverage  Discharged needs reviewed: reviewed  Follow up appointment completed?  Yes     Advance Care Planning:   Does patient have an Advance Directive:  reviewed and current.      CTN reviewed discharge instructions, medical action plan and red flags with family and discussed any barriers to care and/or understanding of plan of care after discharge. Discussed appropriate site of care based on symptoms and resources available to patient including: PCP and Specialist. The patient agrees to contact the PCP office for questions related to their healthcare.      Patients top risk factors for readmission: lack of knowledge about disease, medical condition and medication management  Interventions to address risk factors: Scheduled appointment with PCP-was told by office that appt was not needed as long as he followed with cardio, Scheduled appointment with Specialist-cardio 10/23 & 10/28 and Obtained and reviewed discharge summary and/or continuity of care documents     Discussed follow-up appointments. If no appointment was previously scheduled, appointment scheduling offered: PCP office told patient not needed if cardio follows up. Is follow up appointment scheduled within 7 days of discharge? No  Non-[facility] follow up appointment(s): cardio 10/23, 10/28     Plan for follow-up call in 5-7 days based on severity of symptoms and risk factors. Plan for next call: transitional call - check cardiac rehab  CTN provided contact information for future needs.       Care Transitions Subsequent and Final Call    Subsequent and Final Calls  Do you have any ongoing symptoms?:  No  Have your medications changed?:  Yes  Patient Reports:  decreased lasix and lipitor  Do you have any questions related to your medications?:  No  Do you currently have any active services?:  Yes  Are you currently active with any services?:  Other  Do you have any needs or concerns that I can assist you with?:  No  Care Transitions Interventions  Other Interventions:             Follow Up  Future Appointments   Date Time Provider Noah Tran   11/5/2020  8:00 AM NIKOLAS MEDICATION MGMT MD MED MGMT [place]   11/24/2020 10:30 AM Malena Reddy MD [facility]   12/21/2020

## 2020-11-05 ENCOUNTER — HOSPITAL ENCOUNTER (OUTPATIENT)
Dept: PHARMACY | Age: 65
Setting detail: THERAPIES SERIES
Discharge: HOME | End: 2020-11-05
Payer: MEDICARE

## 2020-11-05 LAB
INR BLD: 2.6
PROTIME: 31.7 SECONDS

## 2020-11-05 PROCEDURE — 36416 COLLJ CAPILLARY BLOOD SPEC: CPT

## 2020-11-05 PROCEDURE — 99211 OFF/OP EST MAY X REQ PHY/QHP: CPT

## 2020-11-05 PROCEDURE — 85610 PROTHROMBIN TIME: CPT

## 2020-11-05 NOTE — PROGRESS NOTES
ANTICOAGULATION SERVICE    Date of Clinic Visit:  11/5/2020    Amari Pan is a 72 y.o. male who presents to clinic today for anticoagulation monitoring and adjustment. Temp 97.1  Recent INR Results:  Internal QC passed  Lab Results   Component Value Date    INR 2.6 11/05/2020    INR 2.1 10/30/2020       Current Warfarin Dosage:  Dosing Plan  As of 11/5/2020    TTR:   100.0 % (1 wk)   Full warfarin instructions:   5 mg every Mon, Wed, Fri; 7.5 mg all other days               Assessment/Plan:    Continue current regimen as INR remains stable. Patient maintained INR range nicely. Continue same dose re-check 1 week. Next Clinic Appointment:  Return date  As of 11/5/2020    TTR:   100.0 % (1 wk)   Next INR check:   11/13/2020             Please call [facility] Anticoagulation Clinic at [phone] with any questions. Thanks!   Shlomo Franco, [address]  Anticoagulation Service Pharmacist  11/5/2020 8:34 AM  CLINICAL PHARMACY CONSULT: MED RECONCILIATION/REVIEW ADDENDUM    For Pharmacy Admin Tracking Only    PHSO: Yes  Total # of Interventions Recommended: 0  Total Interventions Accepted: 0  Time Spent (min): [address], [address]

## 2020-11-09 ENCOUNTER — HOSPITAL ENCOUNTER (OUTPATIENT)
Dept: CARDIAC REHAB | Age: 65
Setting detail: THERAPIES SERIES
Discharge: HOME | End: 2020-11-09
Payer: MEDICARE

## 2020-11-09 PROCEDURE — 93798 PHYS/QHP OP CAR RHAB W/ECG: CPT

## 2020-11-10 ENCOUNTER — CARE COORDINATION (OUTPATIENT)
Dept: CASE MANAGEMENT | Age: 65
End: 2020-11-10

## 2020-11-10 NOTE — CARE COORDINATION
Gogo 45 Transitions Follow Up Call    11/10/2020    Patient: Dago Moe  Patient : 1955   MRN: [id number]  Reason for Admission: Nonischemic CM, CHF, LVD - Life vest, LV thrombus  Discharge Date: 10/16/20 RARS: Readmission Risk Score: 14         Spoke with: Dago Moe    Was able to contact Sylvain Luis for final outreach. He stated that he was\"feeling good\". He went to Cardiac rehab. Yesterday and will be going tomorrow. He denied shortness of breath, no swelling and his weight and BP have been stable. Rt groin cath sites are healed and bruising is almost gone. No questions or concerns. Informed of final outreach and he was in agreement. Episode ended. Needs to be reviewed by the provider   Additional needs identified to be addressed with provider No  none  Discussed COVID-19 related testing which was available at this time. Test results were negative. Patient informed of results, if available? Yes         Method of communication with provider : none    Care Transition Nurse (CTN) contacted the patient by telephone to follow up after admission on 10/13/20. Verified name and  with patient as identifiers. Addressed changes since last contact: symptom management-Cardio  Discharged needs reviewed: none  Follow up appointment completed? Yes    Advance Care Planning:   Does patient have an Advance Directive:  reviewed and current. CTN reviewed discharge instructions, medical action plan and red flags with patient and discussed any barriers to care and/or understanding of plan of care after discharge. Discussed appropriate site of care based on symptoms and resources available to patient including: PCP and Specialist. The patient agrees to contact the PCP office for questions related to their healthcare.      Patients top risk factors for readmission: medical condition  Interventions to address risk factors: Assessment and support for treatment adherence and medication management-reviewed]    Discussed follow-up appointments. If no appointment was previously scheduled, appointment scheduling offered: No Is follow up appointment scheduled within 7 days of discharge? No  Non-[facility] follow up appointment(s):     No plan final call based on severity of symptoms and risk factors. Plan for next call: final call  CTN provided contact information for future needs. Care Transitions Subsequent and Final Call    Subsequent and Final Calls  Do you have any ongoing symptoms?:  No  Have your medications changed?:  Yes  Patient Reports:  lasix to qod, lipitor dose decreased. and warfarin continues to varies  Do you have any questions related to your medications?:  No  Do you currently have any active services?:  Yes  Are you currently active with any services?:  Other  Do you have any needs or concerns that I can assist you with?:  No  Care Transitions Interventions  Other Interventions:             Follow Up  Future Appointments   Date Time Provider Noah Tran   11/13/2020  8:00 AM NIKOLAS MEDICATION MGMT MD MED MGMT [place]   11/24/2020 10:30 AM MD TOM Gleason [facility]   12/21/2020  8:30 AM SCHEDULE, ECHO [facility] MD ECHO [place]   1/15/2021  9:15 AM [facility]       Woo Alaniz RN

## 2020-11-11 ENCOUNTER — HOSPITAL ENCOUNTER (OUTPATIENT)
Dept: CARDIAC REHAB | Age: 65
Setting detail: THERAPIES SERIES
Discharge: HOME | End: 2020-11-11
Payer: MEDICARE

## 2020-11-11 PROCEDURE — 93798 PHYS/QHP OP CAR RHAB W/ECG: CPT

## 2020-11-13 ENCOUNTER — HOSPITAL ENCOUNTER (OUTPATIENT)
Dept: PHARMACY | Age: 65
Setting detail: THERAPIES SERIES
Discharge: HOME | End: 2020-11-13
Payer: MEDICARE

## 2020-11-13 ENCOUNTER — HOSPITAL ENCOUNTER (OUTPATIENT)
Dept: CARDIAC REHAB | Age: 65
Setting detail: THERAPIES SERIES
Discharge: HOME | End: 2020-11-13
Payer: MEDICARE

## 2020-11-13 LAB
INR BLD: 2.7
PROTIME: 32 SECONDS

## 2020-11-13 PROCEDURE — 85610 PROTHROMBIN TIME: CPT

## 2020-11-13 PROCEDURE — 36416 COLLJ CAPILLARY BLOOD SPEC: CPT

## 2020-11-13 PROCEDURE — 99211 OFF/OP EST MAY X REQ PHY/QHP: CPT

## 2020-11-13 PROCEDURE — 93798 PHYS/QHP OP CAR RHAB W/ECG: CPT

## 2020-11-13 NOTE — PROGRESS NOTES
ANTICOAGULATION SERVICE    Date of Clinic Visit:  11/13/2020  Temp 97.6  Kina Aguiar is a 72 y.o. male who presents to clinic today for anticoagulation monitoring and adjustment. Recent INR Results:  Internal QC passed  Lab Results   Component Value Date    INR 2.7 11/13/2020    INR 2.6 11/05/2020       Current Warfarin Dosage:  Dosing Plan  As of 11/13/2020    TTR:   100.0 % (2.1 wk)   Full warfarin instructions:   5 mg every Mon, Wed, Fri; 7.5 mg all other days               Assessment/Plan:    Continue current regimen as INR remains stable. Next Clinic Appointment:  Return date  As of 11/13/2020    TTR:   100.0 % (2.1 wk)   Next INR check:   11/24/2020             Please call [facility] Anticoagulation Clinic at [phone] with any questions. Thanks!   Espinoza Duckworth [facility]  Anticoagulation Service Pharmacist  11/13/2020 8:10 AM  CLINICAL PHARMACY CONSULT: MED RECONCILIATION/REVIEW ADDENDUM    For Pharmacy Admin Tracking Only    PHSO: Yes  Total # of Interventions Recommended: 0  Total Interventions Accepted: 0  Time Spent (min): [address], [address] [facility]

## 2020-11-16 ENCOUNTER — HOSPITAL ENCOUNTER (OUTPATIENT)
Dept: CARDIAC REHAB | Age: 65
Setting detail: THERAPIES SERIES
Discharge: HOME | End: 2020-11-16
Payer: MEDICARE

## 2020-11-16 PROCEDURE — 93798 PHYS/QHP OP CAR RHAB W/ECG: CPT

## 2020-11-18 ENCOUNTER — HOSPITAL ENCOUNTER (OUTPATIENT)
Dept: CARDIAC REHAB | Age: 65
Setting detail: THERAPIES SERIES
Discharge: HOME | End: 2020-11-18
Payer: MEDICARE

## 2020-11-18 PROCEDURE — 93798 PHYS/QHP OP CAR RHAB W/ECG: CPT

## 2020-11-20 ENCOUNTER — HOSPITAL ENCOUNTER (OUTPATIENT)
Dept: CARDIAC REHAB | Age: 65
Setting detail: THERAPIES SERIES
Discharge: HOME | End: 2020-11-20
Payer: MEDICARE

## 2020-11-20 PROCEDURE — 93798 PHYS/QHP OP CAR RHAB W/ECG: CPT

## 2020-11-23 ENCOUNTER — HOSPITAL ENCOUNTER (OUTPATIENT)
Dept: CARDIAC REHAB | Age: 65
Setting detail: THERAPIES SERIES
Discharge: HOME | End: 2020-11-23
Payer: MEDICARE

## 2020-11-23 PROCEDURE — 93798 PHYS/QHP OP CAR RHAB W/ECG: CPT

## 2020-11-24 ENCOUNTER — HOSPITAL ENCOUNTER (OUTPATIENT)
Dept: PHARMACY | Age: 65
Setting detail: THERAPIES SERIES
Discharge: HOME | End: 2020-11-24
Payer: MEDICARE

## 2020-11-24 ENCOUNTER — HOSPITAL ENCOUNTER (OUTPATIENT)
Dept: CARDIAC REHAB | Age: 65
Setting detail: THERAPIES SERIES
Discharge: HOME | End: 2020-11-24
Payer: MEDICARE

## 2020-11-24 ENCOUNTER — OFFICE VISIT (OUTPATIENT)
Dept: INTERNAL MEDICINE | Age: 65
End: 2020-11-24
Payer: MEDICARE

## 2020-11-24 VITALS
HEART RATE: 63 BPM | BODY MASS INDEX: 26.69 KG/M2 | RESPIRATION RATE: 16 BRPM | WEIGHT: 208 LBS | HEIGHT: 74 IN | SYSTOLIC BLOOD PRESSURE: 82 MMHG | DIASTOLIC BLOOD PRESSURE: 60 MMHG

## 2020-11-24 PROBLEM — I25.10 CORONARY ARTERY DISEASE DUE TO LIPID RICH PLAQUE: Status: ACTIVE | Noted: 2020-11-24

## 2020-11-24 PROBLEM — I25.83 CORONARY ARTERY DISEASE DUE TO LIPID RICH PLAQUE: Status: ACTIVE | Noted: 2020-11-24

## 2020-11-24 LAB
INR BLD: 3
PROTIME: 36.3 SECONDS

## 2020-11-24 PROCEDURE — 1123F ACP DISCUSS/DSCN MKR DOCD: CPT | Performed by: INTERNAL MEDICINE

## 2020-11-24 PROCEDURE — 3017F COLORECTAL CA SCREEN DOC REV: CPT | Performed by: INTERNAL MEDICINE

## 2020-11-24 PROCEDURE — 36416 COLLJ CAPILLARY BLOOD SPEC: CPT

## 2020-11-24 PROCEDURE — 99214 OFFICE O/P EST MOD 30 MIN: CPT | Performed by: INTERNAL MEDICINE

## 2020-11-24 PROCEDURE — 1036F TOBACCO NON-USER: CPT | Performed by: INTERNAL MEDICINE

## 2020-11-24 PROCEDURE — 4040F PNEUMOC VAC/ADMIN/RCVD: CPT | Performed by: INTERNAL MEDICINE

## 2020-11-24 PROCEDURE — G8482 FLU IMMUNIZE ORDER/ADMIN: HCPCS | Performed by: INTERNAL MEDICINE

## 2020-11-24 PROCEDURE — 99211 OFF/OP EST MAY X REQ PHY/QHP: CPT

## 2020-11-24 PROCEDURE — G8427 DOCREV CUR MEDS BY ELIG CLIN: HCPCS | Performed by: INTERNAL MEDICINE

## 2020-11-24 PROCEDURE — 93798 PHYS/QHP OP CAR RHAB W/ECG: CPT

## 2020-11-24 PROCEDURE — 85610 PROTHROMBIN TIME: CPT

## 2020-11-24 PROCEDURE — G8417 CALC BMI ABV UP PARAM F/U: HCPCS | Performed by: INTERNAL MEDICINE

## 2020-11-24 ASSESSMENT — ENCOUNTER SYMPTOMS
BLOOD IN STOOL: 0
ABDOMINAL PAIN: 0
BACK PAIN: 0
SHORTNESS OF BREATH: 0
DIARRHEA: 0
COUGH: 0
EYE PAIN: 0
VOMITING: 0
NAUSEA: 0
CONSTIPATION: 0

## 2020-11-24 NOTE — PROGRESS NOTES
ANTICOAGULATION SERVICE    Date of Clinic Visit:  11/24/2020    Filipe Olivares is a 72 y.o. male who presents to clinic today for anticoagulation monitoring and adjustment. Temp = 96.0    Recent INR Results:  Internal QC passed  Lab Results   Component Value Date    INR 3 11/24/2020    INR 2.7 11/13/2020       Current Warfarin Dosage:  Dosing Plan  As of 11/24/2020    TTR:   100.0 % (3.7 wk)   Full warfarin instructions:   11/24: 5 mg; Otherwise 5 mg every Mon, Wed, Fri; 7.5 mg all other days               Assessment/Plan:    Continue current regimen as INR remains stable. INR remains in range today. Recheck 17 days. Next Clinic Appointment:  Return date  As of 11/24/2020    TTR:   100.0 % (3.7 wk)   Next INR check:   12/11/2020             Please call [facility] Anticoagulation Clinic at [phone] with any questions. Thanks! Fang Fragoso, [address] [facility]  Anticoagulation Service Pharmacist  11/24/2020 8:06 AM     CLINICAL PHARMACY CONSULT: MED RECONCILIATION/REVIEW ADDENDUM    For Pharmacy Admin Tracking Only    PHSO: Yes  Total # of Interventions Recommended: 0    - Maintenance Safety Lab Monitoring #: 1  Recommended intervention potential cost savings:   Accepted intervention potential cost savings:    Total Interventions Accepted: 0  Time Spent (min): 15    Fang Fragoso, [address] [facility]

## 2020-11-24 NOTE — PROGRESS NOTES
[place] [address]  Dept: [phone]  Dept Fax: [phone]  Loc: [phone]     Rosita Chavarria is a 72 y.o. male who presents today for his medical conditions/complaintsas noted below. Rosita Chavarria is c/o of   Chief Complaint   Patient presents with    Congestive Heart Failure     2 month    Hyperlipidemia    Coronary Artery Disease         HPI:     Congestive Heart Failure   Presents for follow-up (Systolic CHF) visit. Pertinent negatives include no abdominal pain, chest pain, chest pressure, palpitations or shortness of breath. The symptoms have been stable. His past medical history is significant for CAD. Compliance with total regimen is %. Compliance with diet is %. Compliance with exercise is %. Compliance with medications is %. Hyperlipidemia   This is a chronic problem. The current episode started more than 1 year ago. The problem is controlled. Recent lipid tests were reviewed and are variable. Pertinent negatives include no chest pain or shortness of breath. Coronary Artery Disease   Presents for follow-up (Mild CAD with only 10 to 20% stenosis per cardiac catheterization) visit. Pertinent negatives include no chest pain, chest pressure, dizziness, leg swelling, palpitations or shortness of breath. Risk factors include hyperlipidemia. His past medical history is significant for CHF. The symptoms have been stable. Compliance with diet is good. Compliance with exercise is good. Compliance with medications is good.        No results found for: LABA1C         No results found for: LABMICR  LDL Cholesterol (mg/dL)   Date Value   09/21/2020 120   10/28/2019 117   10/24/2018 111         AST (U/L)   Date Value   10/14/2020 14     ALT (U/L)   Date Value   10/14/2020 12     BUN (mg/dL)   Date Value   10/16/2020 34 (H)     BP Readings from Last 3 Encounters:   11/24/20 82/60   10/28/20 (!) 88/58   10/16/20 95/70              Past Medical History:   Diagnosis Date    Abnormal cardiovascular stress test 10/2020     Large inferior and inferoapical infarction, minimal faiza-infarct ischemia / Severely reduced LV systolic function.  Adenocarcinoma ([address]     Lip.  ENAMORADO (dyspnea on exertion)     H/O echocardiogram 10/08/2020    ECHO 10/8/2020: EF 20%, grade I DD, mild TR, RVSP is 50 mmHg, moderate PHTN.  Hyperlipidemia       Past Surgical History:   Procedure Laterality Date    CARDIAC CATHETERIZATION  10/14/2020    COLONOSCOPY  10/27/2010    Showed multiple internal hemorrhoids.  MOUTH SURGERY  01/2007    Lip surgery. Family History   Problem Relation Age of Onset    Heart Attack Father         Myocardial infarction in his 76s.  Diabetes Father     Glaucoma Mother           Social History     Tobacco Use    Smoking status: Never Smoker    Smokeless tobacco: Never Used   Substance Use Topics    Alcohol use: No         Current Outpatient Medications   Medication Sig Dispense Refill    furosemide (LASIX) 20 MG tablet Take 1 tablet by mouth daily (Patient taking differently: Take 20 mg by mouth every 48 hours ) 90 tablet 3    metoprolol succinate (TOPROL XL) 25 MG extended release tablet Take 0.5 tablets by mouth nightly 45 tablet 3    sacubitril-valsartan (ENTRESTO) 24-26 MG per tablet Take 1 tablet by mouth 2 times daily 180 tablet 3    atorvastatin (LIPITOR) 20 MG tablet Take 1 tablet by mouth daily 90 tablet 3    aspirin 81 MG EC tablet Take 1 tablet by mouth daily 30 tablet 3    warfarin (COUMADIN) 5 MG tablet Take 1 tablet by mouth daily Take 1.5 tablets (7.5 mg) by mouth on Tuesday and Thursday. Take 1 tablet ( 5 mg) by mouth MWFSS. Or as directed by INR results. 45 tablet 3     No current facility-administered medications for this visit.       Allergies   Allergen Reactions    Penicillins Other (See Comments)     As a child- unsure of reaction       Health Maintenance   Topic Date Due    Hepatitis C screen  1955    HIV screen  10/19/1970    Diabetes screen  10/19/1995    Shingles Vaccine (1 of 2) 10/19/2005    Pneumococcal 65+ years Vaccine (1 of 1 - PPSV23) 10/19/2020    Colon cancer screen colonoscopy  10/27/2020    Lipid screen  09/21/2021    Potassium monitoring  10/16/2021    Creatinine monitoring  10/16/2021    DTaP/Tdap/Td vaccine (2 - Td) 05/19/2022    Flu vaccine  Completed    Hepatitis A vaccine  Aged Out    Hepatitis B vaccine  Aged Out    Hib vaccine  Aged Out    Meningococcal (ACWY) vaccine  Aged Out       Subjective:      Review of Systems   Constitutional: Negative for chills and fever. HENT: Negative for hearing loss. Eyes: Negative for pain and visual disturbance. Respiratory: Negative for cough and shortness of breath. Cardiovascular: Negative for chest pain, palpitations and leg swelling. Gastrointestinal: Negative for abdominal pain, blood in stool, constipation, diarrhea, nausea and vomiting. Endocrine: Negative for cold intolerance, polydipsia and polyuria. Genitourinary: Negative for difficulty urinating, dysuria and hematuria. Musculoskeletal: Negative for arthralgias, back pain, gait problem and neck pain. Skin: Negative for pallor and rash. Neurological: Negative for dizziness, weakness, numbness and headaches. Hematological: Negative for adenopathy. Does not bruise/bleed easily. Psychiatric/Behavioral: Negative for confusion. The patient is not nervous/anxious. Objective:     Physical Exam  Vitals signs reviewed. Constitutional:       Appearance: He is well-developed. HENT:      Head: Normocephalic and atraumatic. Eyes:      Pupils: Pupils are equal, round, and reactive to light. Neck:      Musculoskeletal: Neck supple. Cardiovascular:      Rate and Rhythm: Normal rate and regular rhythm. Heart sounds: No murmur. No friction rub. No gallop. Pulmonary:      Effort: Pulmonary effort is normal.      Breath sounds: Normal breath sounds. No wheezing or rales. Abdominal:      General: There is no distension. Palpations: Abdomen is soft. There is no mass. Tenderness: There is no abdominal tenderness. There is no rebound. Musculoskeletal: Normal range of motion. Lymphadenopathy:      Cervical: No cervical adenopathy. Skin:     General: Skin is warm and dry. Findings: No rash. Neurological:      Mental Status: He is alert and oriented to person, place, and time. Cranial Nerves: No cranial nerve deficit (grossly). Psychiatric:         Thought Content: Thought content normal.        BP 82/60 (Site: Left Upper Arm, Position: Sitting, Cuff Size: Medium Adult)   Pulse 63   Resp 16   Ht 6' 2\" (1.88 m)   Wt 208 lb (94.3 kg)   BMI 26.71 kg/m²     Assessment:       Diagnosis Orders   1. Chronic systolic congestive heart failure ([facility] [address]     2. Hyperlipidemia, unspecified hyperlipidemia type     3. Coronary artery disease due to lipid rich plaque     Dyspnea on exertion is much better now with patient doing the Lasix. He is also on Coumadin because echocardiogram showed thrombus in the apex of the left ventricle. INR is managed per [facility] clinic. He does  have follow-up echocardiogram on 12/21/2020. After that cardiologist will determine whether patient needs AICD placement. Patient does have follow-up cardiology appointment on 1/15/2021. He also knows to take not only the Coumadin and Lasix but also the Entresto, metoprolol, aspirin, and Lipitor. Plan:       Return in about 3 months (around 2/24/2021) for CHF, CAD, Hyperlipidemia. No orders of the defined types were placed in this encounter. No orders of the defined types were placed in this encounter. Patientgiven educational materials - see patient instructions. Discussed use, benefit,and side effects of prescribed medications.   All patient questions answered. Ptvoiced understanding. Reviewed health maintenance. Instructed to continue currentmedications, diet and exercise. Patient agreed with treatment plan. Follow up asdirected.      Electronically signed by Carri Huntley MD on 11/24/2020 at 11:06 AM

## 2020-11-25 ENCOUNTER — HOSPITAL ENCOUNTER (OUTPATIENT)
Dept: CARDIAC REHAB | Age: 65
Setting detail: THERAPIES SERIES
Discharge: HOME | End: 2020-11-25
Payer: MEDICARE

## 2020-11-25 PROCEDURE — 93798 PHYS/QHP OP CAR RHAB W/ECG: CPT

## 2020-12-02 ENCOUNTER — HOSPITAL ENCOUNTER (OUTPATIENT)
Dept: CARDIAC REHAB | Age: 65
Setting detail: THERAPIES SERIES
Discharge: HOME | End: 2020-12-02
Payer: MEDICARE

## 2020-12-02 PROCEDURE — 93798 PHYS/QHP OP CAR RHAB W/ECG: CPT

## 2020-12-04 ENCOUNTER — HOSPITAL ENCOUNTER (OUTPATIENT)
Dept: CARDIAC REHAB | Age: 65
Setting detail: THERAPIES SERIES
Discharge: HOME | End: 2020-12-04
Payer: MEDICARE

## 2020-12-04 PROCEDURE — 93798 PHYS/QHP OP CAR RHAB W/ECG: CPT

## 2020-12-07 ENCOUNTER — HOSPITAL ENCOUNTER (OUTPATIENT)
Dept: CARDIAC REHAB | Age: 65
Setting detail: THERAPIES SERIES
Discharge: HOME | End: 2020-12-07
Payer: MEDICARE

## 2020-12-07 PROCEDURE — 93798 PHYS/QHP OP CAR RHAB W/ECG: CPT

## 2020-12-09 ENCOUNTER — HOSPITAL ENCOUNTER (OUTPATIENT)
Dept: CARDIAC REHAB | Age: 65
Setting detail: THERAPIES SERIES
Discharge: HOME | End: 2020-12-09
Payer: MEDICARE

## 2020-12-09 PROCEDURE — 93798 PHYS/QHP OP CAR RHAB W/ECG: CPT

## 2020-12-11 ENCOUNTER — HOSPITAL ENCOUNTER (OUTPATIENT)
Dept: PHARMACY | Age: 65
Setting detail: THERAPIES SERIES
Discharge: HOME | End: 2020-12-11
Payer: MEDICARE

## 2020-12-11 ENCOUNTER — HOSPITAL ENCOUNTER (OUTPATIENT)
Dept: CARDIAC REHAB | Age: 65
Setting detail: THERAPIES SERIES
Discharge: HOME | End: 2020-12-11
Payer: MEDICARE

## 2020-12-11 LAB
INR BLD: 2.6
PROTIME: 31.3 SECONDS

## 2020-12-11 PROCEDURE — 93798 PHYS/QHP OP CAR RHAB W/ECG: CPT

## 2020-12-11 PROCEDURE — 36416 COLLJ CAPILLARY BLOOD SPEC: CPT

## 2020-12-11 PROCEDURE — 99211 OFF/OP EST MAY X REQ PHY/QHP: CPT

## 2020-12-11 PROCEDURE — 85610 PROTHROMBIN TIME: CPT

## 2020-12-11 NOTE — PROGRESS NOTES
ANTICOAGULATION SERVICE    Date of Clinic Visit:  12/11/2020    Davidson Steiner is a 72 y.o. male who presents to clinic today for anticoagulation monitoring and adjustment. Temp 96.7  Recent INR Results:  Internal QC passed  Lab Results   Component Value Date    INR 2.6 12/11/2020    INR 3 11/24/2020       Current Warfarin Dosage:  Dosing Plan  As of 12/11/2020    TTR:   100.0 % (1.4 mo)   Full warfarin instructions:   5 mg every Mon, Wed, Fri; 7.5 mg all other days               Assessment/Plan:    Continue current regimen as INR remains stable. Next Clinic Appointment:  Return date  As of 12/11/2020    TTR:   100.0 % (1.4 mo)   Next INR check:   1/12/2021             Please call [facility] Anticoagulation Clinic at [phone] with any questions. Thanks!   Roxanna Rodriguez [facility]  Anticoagulation Service Pharmacist  12/11/2020 8:12 AM  CLINICAL PHARMACY CONSULT: MED RECONCILIATION/REVIEW ADDENDUM    For Pharmacy Admin Tracking Only    PHSO: Yes  Total # of Interventions Recommended: 0  Total Interventions Accepted: 0  Time Spent (min): [address], [address]

## 2020-12-14 ENCOUNTER — HOSPITAL ENCOUNTER (OUTPATIENT)
Dept: CARDIAC REHAB | Age: 65
Setting detail: THERAPIES SERIES
Discharge: HOME | End: 2020-12-14
Payer: MEDICARE

## 2020-12-14 PROCEDURE — 93798 PHYS/QHP OP CAR RHAB W/ECG: CPT

## 2020-12-16 ENCOUNTER — HOSPITAL ENCOUNTER (OUTPATIENT)
Dept: CARDIAC REHAB | Age: 65
Setting detail: THERAPIES SERIES
Discharge: HOME | End: 2020-12-16
Payer: MEDICARE

## 2020-12-16 PROCEDURE — 93798 PHYS/QHP OP CAR RHAB W/ECG: CPT

## 2020-12-18 ENCOUNTER — HOSPITAL ENCOUNTER (OUTPATIENT)
Dept: CARDIAC REHAB | Age: 65
Setting detail: THERAPIES SERIES
Discharge: HOME | End: 2020-12-18
Payer: MEDICARE

## 2020-12-18 PROCEDURE — 93798 PHYS/QHP OP CAR RHAB W/ECG: CPT

## 2020-12-21 ENCOUNTER — HOSPITAL ENCOUNTER (OUTPATIENT)
Dept: NON INVASIVE DIAGNOSTICS | Age: 65
Discharge: HOME | End: 2020-12-21
Payer: MEDICARE

## 2020-12-21 ENCOUNTER — TELEPHONE (OUTPATIENT)
Dept: CARDIOLOGY | Age: 65
End: 2020-12-21

## 2020-12-21 PROCEDURE — 93320 DOPPLER ECHO COMPLETE: CPT

## 2020-12-21 PROCEDURE — 93308 TTE F-UP OR LMTD: CPT

## 2020-12-21 PROCEDURE — 93321 DOPPLER ECHO F-UP/LMTD STD: CPT

## 2020-12-21 NOTE — TELEPHONE ENCOUNTER
Patient notified of need for repeat echo at end of January and to continue wearing the lifevest. Patient also notified of recommendations to hold off on traveling for now. Patient replied stating that \"I have a house in [place] and Will be going there for 3 weeks. I need a letter though so I can travel\". Patient was starting to get agitated and stated that he \"removes the batteries every so often because the thing keeps going off anyway\".  Writer informed patient that he needs to keep the batteries in and continue wearing the lifevest.

## 2020-12-21 NOTE — TELEPHONE ENCOUNTER
Pt called to ask for a letter stating he is wearing a life vest and he needs to wear it on the plane while flying to go to [place] Pt stated he is relieving the clinic of responsibility if something happens to him and he is denied by the doctors to fly due to [place] or others reasons. If he is denied to fly he will put the vest in a box and mail it and will go on the plane without the vest on because he is going to go to [place] for 3 weeks. Pt would like a call from the Dr in the morning when he gets here to discuss. Pt also stated he has had 3 different cardiologists involved in his care and tomorrow will have a 4th.

## 2020-12-21 NOTE — TELEPHONE ENCOUNTER
Patient needs a repeat limited echo end of January to make decision regarding ICD as it has not been 90 days since last echo. Continue wearing life vest. As far as travelling to [facility], I also recommend holding off for now.

## 2020-12-21 NOTE — TELEPHONE ENCOUNTER
LVEF 30%  No apical thrombus  Continue lifevest  Please send to Dr. Mitchel Reyes to discuss about traveling, for now, I would recommend holding off.

## 2020-12-21 NOTE — TELEPHONE ENCOUNTER
Patient up to window, stated that \"I had an echo done today, I want it read TODAY\"  \"I am leaving for [place] basically Wednesday and I either need to get rid of this monitor, or get paperwork that will allow me to have it on the plane\"    Pt would like for you to call him ASAP with results.   He left cell phone number of [phone]

## 2021-01-12 ENCOUNTER — HOSPITAL ENCOUNTER (OUTPATIENT)
Dept: PHARMACY | Age: 66
Setting detail: THERAPIES SERIES
Discharge: HOME | End: 2021-01-12
Payer: MEDICARE

## 2021-01-12 DIAGNOSIS — I51.3 LEFT VENTRICULAR THROMBUS: ICD-10-CM

## 2021-01-12 LAB
INR BLD: 2.1
PROTIME: 25.7 SECONDS

## 2021-01-12 PROCEDURE — 85610 PROTHROMBIN TIME: CPT

## 2021-01-12 PROCEDURE — 36416 COLLJ CAPILLARY BLOOD SPEC: CPT

## 2021-01-12 PROCEDURE — 99211 OFF/OP EST MAY X REQ PHY/QHP: CPT

## 2021-01-12 NOTE — PROGRESS NOTES
ANTICOAGULATION SERVICE    Date of Clinic Visit:  1/12/2021    Talya Duggan is a 72 y.o. male who presents to clinic today for anticoagulation monitoring and adjustment. Recent INR Results:  Internal QC passed  Lab Results   Component Value Date    INR 2.1 01/12/2021    INR 2.6 12/11/2020       Current Warfarin Dosage:  Dosing Plan  As of 1/12/2021    TTR:  100.0 % (2.5 mo)   Full warfarin instructions:  5 mg every Mon, Wed, Fri; 7.5 mg all other days               Assessment/Plan:    Continue current regimen as INR remains stable. Next Clinic Appointment:  Return date  As of 1/12/2021    TTR:  100.0 % (2.5 mo)   Next INR check:  2/9/2021             Please call [facility] at [phone] with any questions. Thanks!   Quyen Birch, [address] [facility]  Anticoagulation Service Pharmacist  1/12/2021 8:16 AM  CLINICAL PHARMACY CONSULT: MED RECONCILIATION/REVIEW ADDENDUM    For Pharmacy Admin Tracking Only    PHSO: Yes  Total # of Interventions Recommended: 0  Total Interventions Accepted: 0  Time Spent (min): [address]

## 2021-01-13 ENCOUNTER — TELEPHONE (OUTPATIENT)
Dept: INTERNAL MEDICINE | Age: 66
End: 2021-01-13

## 2021-01-13 DIAGNOSIS — Z20.822 EXPOSURE TO COVID-19 VIRUS: Primary | ICD-10-CM

## 2021-01-13 NOTE — TELEPHONE ENCOUNTER
Pt was sick last year, and thinks it may have been Covid. He is requesting COVID antibody test.  Pended if you agree. Call pt back when signed- [phone].

## 2021-01-14 ENCOUNTER — HOSPITAL ENCOUNTER (OUTPATIENT)
Dept: LAB | Age: 66
Discharge: HOME | End: 2021-01-14
Payer: MEDICARE

## 2021-01-14 DIAGNOSIS — Z20.822 EXPOSURE TO COVID-19 VIRUS: ICD-10-CM

## 2021-01-14 LAB — SARS-COV-2 ANTIBODY, TOTAL: NEGATIVE

## 2021-01-14 PROCEDURE — 36415 COLL VENOUS BLD VENIPUNCTURE: CPT

## 2021-01-14 PROCEDURE — 86769 SARS-COV-2 COVID-19 ANTIBODY: CPT

## 2021-01-15 ENCOUNTER — TELEMEDICINE (OUTPATIENT)
Dept: CARDIOLOGY | Age: 66
End: 2021-01-15
Payer: MEDICARE

## 2021-01-15 DIAGNOSIS — I50.22 CHRONIC SYSTOLIC CONGESTIVE HEART FAILURE (HCC): Primary | ICD-10-CM

## 2021-01-15 PROCEDURE — 1123F ACP DISCUSS/DSCN MKR DOCD: CPT | Performed by: INTERNAL MEDICINE

## 2021-01-15 PROCEDURE — G8482 FLU IMMUNIZE ORDER/ADMIN: HCPCS | Performed by: INTERNAL MEDICINE

## 2021-01-15 PROCEDURE — 4040F PNEUMOC VAC/ADMIN/RCVD: CPT | Performed by: INTERNAL MEDICINE

## 2021-01-15 PROCEDURE — G8428 CUR MEDS NOT DOCUMENT: HCPCS | Performed by: INTERNAL MEDICINE

## 2021-01-15 PROCEDURE — 1036F TOBACCO NON-USER: CPT | Performed by: INTERNAL MEDICINE

## 2021-01-15 PROCEDURE — G8417 CALC BMI ABV UP PARAM F/U: HCPCS | Performed by: INTERNAL MEDICINE

## 2021-01-15 PROCEDURE — 3017F COLORECTAL CA SCREEN DOC REV: CPT | Performed by: INTERNAL MEDICINE

## 2021-01-15 PROCEDURE — 99214 OFFICE O/P EST MOD 30 MIN: CPT | Performed by: INTERNAL MEDICINE

## 2021-01-15 ASSESSMENT — ENCOUNTER SYMPTOMS
NAUSEA: 0
APNEA: 0
CHOKING: 0
VOMITING: 0
CHEST TIGHTNESS: 0
ABDOMINAL PAIN: 0
BACK PAIN: 0
SHORTNESS OF BREATH: 0

## 2021-01-15 NOTE — PROGRESS NOTES
1/15/2021    TELEHEALTH EVALUATION -- Audio/Visual (During TIUKJ-48 public health emergency)    HPI:    Yordy Layman (:  1955) has requested an audio/video evaluation for the following concern(s):    Denies exertional chest pain or SOB, no dizziness or syncope. Physically active and no symptoms. Review of Systems   Constitutional: Negative for chills, fatigue and fever. HENT: Negative for congestion and dental problem. Respiratory: Negative for apnea, choking, chest tightness and shortness of breath. Cardiovascular: Negative for chest pain and palpitations. Gastrointestinal: Negative for abdominal pain, nausea and vomiting. Endocrine: Negative for cold intolerance and heat intolerance. Genitourinary: Negative for difficulty urinating and dysuria. Musculoskeletal: Negative for arthralgias and back pain. Neurological: Negative for dizziness, light-headedness and headaches. Psychiatric/Behavioral: Negative for agitation and behavioral problems. Prior to Visit Medications    Medication Sig Taking? Authorizing Provider   furosemide (LASIX) 20 MG tablet Take 1 tablet by mouth daily  Patient taking differently: Take 20 mg by mouth every 48 hours   Al Colvin MD   metoprolol succinate (TOPROL XL) 25 MG extended release tablet Take 0.5 tablets by mouth nightly  Al Colvin MD   sacubitril-valsartan (ENTRESTO) 24-26 MG per tablet Take 1 tablet by mouth 2 times daily  Al Colvin MD   atorvastatin (LIPITOR) 20 MG tablet Take 1 tablet by mouth daily  Al Colvin MD   warfarin (COUMADIN) 5 MG tablet Take 1 tablet by mouth daily Take 1.5 tablets (7.5 mg) by mouth on Tuesday and Thursday. Take 1 tablet ( 5 mg) by mouth MWFSS. Or as directed by INR results.   Samia Elias DO   aspirin 81 MG EC tablet Take 1 tablet by mouth daily  Samia Elias DO       Social History     Tobacco Use    Smoking status: Never Smoker    Smokeless tobacco: Never Used   Substance Use Topics    Alcohol use: No    Drug use: No        Allergies   Allergen Reactions    Penicillins Other (See Comments)     As a child- unsure of reaction       PHYSICAL EXAMINATION:  [ INSTRUCTIONS:  \"[x]\" Indicates a positive item  \"[]\" Indicates a negative item  -- DELETE ALL ITEMS NOT EXAMINED]  Vital Signs: (As obtained by patient/caregiver or practitioner observation)    Blood pressure-  Heart rate-    Respiratory rate-    Temperature-  Pulse oximetry-     Constitutional: [x] Appears well-developed and well-nourished [x] No apparent distress      [] Abnormal-   Mental status  [] Alert and awake  [] Oriented to person/place/time []Able to follow commands      Eyes:  EOM    [x]  Normal  [] Abnormal-  Sclera  [x]  Normal  [] Abnormal -         Discharge [x]  None visible  [] Abnormal -    HENT:   [x] Normocephalic, atraumatic. [] Abnormal   [x] Mouth/Throat: Mucous membranes are moist.     External Ears [x] Normal  [] Abnormal-     Neck: [x] No visualized mass     Pulmonary/Chest: [x] Respiratory effort normal.  [x] No visualized signs of difficulty breathing or respiratory distress        [] Abnormal-      Musculoskeletal:   [x] Normal gait with no signs of ataxia         [x] Normal range of motion of neck        [] Abnormal-       Neurological:        [x] No Facial Asymmetry (Cranial nerve 7 motor function) (limited exam to video visit)          [x] No gaze palsy        [] Abnormal-         Skin:        [x] No significant exanthematous lesions or discoloration noted on facial skin         [] Abnormal-            Psychiatric:       [x] Normal Affect [] No Hallucinations        [] Abnormal-     Other pertinent observable physical exam findings-     ASSESSMENT/PLAN:  Dilated Cardiomyopathy. Cath on 10/2020 mild CAD. EF 15%. Echo 10/2020 EF 20-25%, LV clot. Ou Coumadin and maximal medical therapy. Repeat Echo on 12/2020 EF 30%.  On Life vest, stable, no signs of volume overload, feels very good, would like to finish rehab, recheck another echo to assess EF before AICD placement if still below 35%. Continue current medications. Tanika Harding is a 72 y.o. male being evaluated by a Virtual Visit (video visit) encounter to address concerns as mentioned above. A caregiver was present when appropriate. Due to this being a TeleHealth encounter (During OBSKA-17 public health emergency), evaluation of the following organ systems was limited: Vitals/Constitutional/EENT/Resp/CV/GI//MS/Neuro/Skin/Heme-Lymph-Imm. Pursuant to the emergency declaration under the [address] [place] [address], [address] [place] [address] authority and the [employer] Act, this Virtual Visit was conducted with patient's (and/or legal guardian's) consent, to reduce the patient's risk of exposure to COVID-19 and provide necessary medical care. The patient (and/or legal guardian) has also been advised to contact this office for worsening conditions or problems, and seek emergency medical treatment and/or call 911 if deemed necessary. Patient identification was verified at the start of the visit: Yes    Total time spent on this encounter: Not billed by time    Services were provided through a video synchronous discussion virtually to substitute for in-person clinic visit. Patient and provider were located at their individual homes. --Shikha Rivera MD on 1/15/2021 at 11:27 AM    An electronic signature was used to authenticate this note.

## 2021-01-20 ENCOUNTER — HOSPITAL ENCOUNTER (OUTPATIENT)
Dept: CARDIAC REHAB | Age: 66
Setting detail: THERAPIES SERIES
Discharge: HOME | End: 2021-01-20
Payer: MEDICARE

## 2021-01-20 PROCEDURE — 93798 PHYS/QHP OP CAR RHAB W/ECG: CPT

## 2021-01-22 ENCOUNTER — HOSPITAL ENCOUNTER (OUTPATIENT)
Dept: CARDIAC REHAB | Age: 66
Setting detail: THERAPIES SERIES
Discharge: HOME | End: 2021-01-22
Payer: MEDICARE

## 2021-01-22 PROCEDURE — 93798 PHYS/QHP OP CAR RHAB W/ECG: CPT

## 2021-01-25 ENCOUNTER — HOSPITAL ENCOUNTER (OUTPATIENT)
Dept: CARDIAC REHAB | Age: 66
Setting detail: THERAPIES SERIES
Discharge: HOME | End: 2021-01-25
Payer: MEDICARE

## 2021-01-25 PROCEDURE — 93798 PHYS/QHP OP CAR RHAB W/ECG: CPT

## 2021-01-27 ENCOUNTER — HOSPITAL ENCOUNTER (OUTPATIENT)
Dept: CARDIAC REHAB | Age: 66
Setting detail: THERAPIES SERIES
Discharge: HOME | End: 2021-01-27
Payer: MEDICARE

## 2021-01-27 PROCEDURE — 93798 PHYS/QHP OP CAR RHAB W/ECG: CPT

## 2021-01-29 ENCOUNTER — HOSPITAL ENCOUNTER (OUTPATIENT)
Dept: CARDIAC REHAB | Age: 66
Setting detail: THERAPIES SERIES
Discharge: HOME | End: 2021-01-29
Payer: MEDICARE

## 2021-01-29 PROCEDURE — 93798 PHYS/QHP OP CAR RHAB W/ECG: CPT

## 2021-02-01 ENCOUNTER — HOSPITAL ENCOUNTER (OUTPATIENT)
Dept: CARDIAC REHAB | Age: 66
Setting detail: THERAPIES SERIES
Discharge: HOME | End: 2021-02-01
Payer: MEDICARE

## 2021-02-01 PROCEDURE — 93798 PHYS/QHP OP CAR RHAB W/ECG: CPT

## 2021-02-03 ENCOUNTER — HOSPITAL ENCOUNTER (OUTPATIENT)
Dept: CARDIAC REHAB | Age: 66
Setting detail: THERAPIES SERIES
Discharge: HOME | End: 2021-02-03
Payer: MEDICARE

## 2021-02-03 PROCEDURE — 93798 PHYS/QHP OP CAR RHAB W/ECG: CPT

## 2021-02-05 ENCOUNTER — HOSPITAL ENCOUNTER (OUTPATIENT)
Dept: CARDIAC REHAB | Age: 66
Setting detail: THERAPIES SERIES
Discharge: HOME | End: 2021-02-05
Payer: MEDICARE

## 2021-02-05 ENCOUNTER — PATIENT MESSAGE (OUTPATIENT)
Dept: CARDIOLOGY | Age: 66
End: 2021-02-05

## 2021-02-05 DIAGNOSIS — I42.0 DILATED CARDIOMYOPATHY (HCC): Primary | ICD-10-CM

## 2021-02-05 PROCEDURE — 93798 PHYS/QHP OP CAR RHAB W/ECG: CPT

## 2021-02-05 NOTE — TELEPHONE ENCOUNTER
From: Adrian Villegas  To: Aicha Orozco MD  Sent: 2/5/2021 8:52 AM EST  Subject: Test Results Question    This message is being sent by Darlyn Almonte on behalf of Rina Zimmerjazz will be finished with my cardiac rehab on March 19th. Could we please schedule an echo week of March 22 to see progress of my heart? Thank you.  Lupillo Ling     If you already received this I apologize,  but didn't think it went through

## 2021-02-08 ENCOUNTER — HOSPITAL ENCOUNTER (OUTPATIENT)
Dept: CARDIAC REHAB | Age: 66
Setting detail: THERAPIES SERIES
Discharge: HOME | End: 2021-02-08
Payer: MEDICARE

## 2021-02-08 PROCEDURE — 93798 PHYS/QHP OP CAR RHAB W/ECG: CPT

## 2021-02-10 ENCOUNTER — HOSPITAL ENCOUNTER (OUTPATIENT)
Dept: PHARMACY | Age: 66
Setting detail: THERAPIES SERIES
Discharge: HOME | End: 2021-02-10
Payer: MEDICARE

## 2021-02-10 ENCOUNTER — HOSPITAL ENCOUNTER (OUTPATIENT)
Dept: CARDIAC REHAB | Age: 66
Setting detail: THERAPIES SERIES
Discharge: HOME | End: 2021-02-10
Payer: MEDICARE

## 2021-02-10 DIAGNOSIS — I51.3 LEFT VENTRICULAR THROMBUS: ICD-10-CM

## 2021-02-10 LAB
INR BLD: 2.5
PROTIME: 29.3 SECONDS

## 2021-02-10 PROCEDURE — 36416 COLLJ CAPILLARY BLOOD SPEC: CPT

## 2021-02-10 PROCEDURE — 85610 PROTHROMBIN TIME: CPT

## 2021-02-10 PROCEDURE — 99211 OFF/OP EST MAY X REQ PHY/QHP: CPT

## 2021-02-10 PROCEDURE — 93798 PHYS/QHP OP CAR RHAB W/ECG: CPT

## 2021-02-10 NOTE — PROGRESS NOTES
ANTICOAGULATION SERVICE    Date of Clinic Visit:  2/10/2021    Yordy Garg is a 72 y.o. male who presents to clinic today for anticoagulation monitoring and adjustment. Recent INR Results:  Internal QC passed  Lab Results   Component Value Date    INR 2.5 02/10/2021    INR 2.1 01/12/2021       Current Warfarin Dosage:  Dosing Plan  As of 2/10/2021    TTR:  100.0 % (3.5 mo)   Full warfarin instructions:  5 mg every Mon, Wed, Fri; 7.5 mg all other days               Assessment/Plan:    Continue current regimen as INR remains stable. Next Clinic Appointment:  Return date  As of 2/10/2021    TTR:  100.0 % (3.5 mo)   Next INR check:  3/10/2021             Please call [facility] at [phone] with any questions. Thanks!   Prashant Rutherford [facility]  Anticoagulation Service Pharmacist  2/10/2021 8:49 AM   CLINICAL PHARMACY CONSULT: MED RECONCILIATION/REVIEW ADDENDUM    For Pharmacy Admin Tracking Only    PHSO: No  Total # of Interventions Recommended: 0    Total Interventions Accepted: 0  Time Spent (min): Ab Kamara 219

## 2021-02-12 ENCOUNTER — HOSPITAL ENCOUNTER (OUTPATIENT)
Dept: CARDIAC REHAB | Age: 66
Setting detail: THERAPIES SERIES
Discharge: HOME | End: 2021-02-12
Payer: MEDICARE

## 2021-02-12 PROCEDURE — 93798 PHYS/QHP OP CAR RHAB W/ECG: CPT

## 2021-02-15 ENCOUNTER — HOSPITAL ENCOUNTER (OUTPATIENT)
Dept: CARDIAC REHAB | Age: 66
Setting detail: THERAPIES SERIES
Discharge: HOME | End: 2021-02-15
Payer: MEDICARE

## 2021-02-15 PROCEDURE — 93798 PHYS/QHP OP CAR RHAB W/ECG: CPT

## 2021-02-17 ENCOUNTER — HOSPITAL ENCOUNTER (OUTPATIENT)
Dept: CARDIAC REHAB | Age: 66
Setting detail: THERAPIES SERIES
Discharge: HOME | End: 2021-02-17
Payer: MEDICARE

## 2021-02-17 PROCEDURE — 93798 PHYS/QHP OP CAR RHAB W/ECG: CPT

## 2021-02-17 RX ORDER — WARFARIN SODIUM 5 MG/1
5 TABLET ORAL DAILY
Qty: 90 TABLET | Refills: 3 | Status: SHIPPED | OUTPATIENT
Start: 2021-02-17 | End: 2021-12-15 | Stop reason: SDUPTHER

## 2021-03-08 ENCOUNTER — HOSPITAL ENCOUNTER (OUTPATIENT)
Dept: CARDIAC REHAB | Age: 66
Setting detail: THERAPIES SERIES
Discharge: HOME | End: 2021-03-08
Payer: MEDICARE

## 2021-03-08 ENCOUNTER — OFFICE VISIT (OUTPATIENT)
Dept: INTERNAL MEDICINE | Age: 66
End: 2021-03-08
Payer: MEDICARE

## 2021-03-08 ENCOUNTER — IMMUNIZATION (OUTPATIENT)
Dept: LAB | Age: 66
End: 2021-03-08
Payer: MEDICARE

## 2021-03-08 VITALS
DIASTOLIC BLOOD PRESSURE: 48 MMHG | RESPIRATION RATE: 16 BRPM | HEART RATE: 64 BPM | HEIGHT: 74 IN | SYSTOLIC BLOOD PRESSURE: 80 MMHG | BODY MASS INDEX: 27.46 KG/M2 | WEIGHT: 214 LBS

## 2021-03-08 DIAGNOSIS — I25.10 CORONARY ARTERY DISEASE DUE TO LIPID RICH PLAQUE: ICD-10-CM

## 2021-03-08 DIAGNOSIS — E78.5 HYPERLIPIDEMIA, UNSPECIFIED HYPERLIPIDEMIA TYPE: ICD-10-CM

## 2021-03-08 DIAGNOSIS — I25.83 CORONARY ARTERY DISEASE DUE TO LIPID RICH PLAQUE: ICD-10-CM

## 2021-03-08 DIAGNOSIS — I50.22 CHRONIC SYSTOLIC CONGESTIVE HEART FAILURE (HCC): Primary | ICD-10-CM

## 2021-03-08 PROCEDURE — 4040F PNEUMOC VAC/ADMIN/RCVD: CPT | Performed by: INTERNAL MEDICINE

## 2021-03-08 PROCEDURE — 3017F COLORECTAL CA SCREEN DOC REV: CPT | Performed by: INTERNAL MEDICINE

## 2021-03-08 PROCEDURE — G8482 FLU IMMUNIZE ORDER/ADMIN: HCPCS | Performed by: INTERNAL MEDICINE

## 2021-03-08 PROCEDURE — 1036F TOBACCO NON-USER: CPT | Performed by: INTERNAL MEDICINE

## 2021-03-08 PROCEDURE — 91301 COVID-19, MODERNA VACCINE 100MCG/0.5ML DOSE: CPT

## 2021-03-08 PROCEDURE — 99213 OFFICE O/P EST LOW 20 MIN: CPT

## 2021-03-08 PROCEDURE — G8417 CALC BMI ABV UP PARAM F/U: HCPCS | Performed by: INTERNAL MEDICINE

## 2021-03-08 PROCEDURE — G8427 DOCREV CUR MEDS BY ELIG CLIN: HCPCS | Performed by: INTERNAL MEDICINE

## 2021-03-08 PROCEDURE — 99214 OFFICE O/P EST MOD 30 MIN: CPT | Performed by: INTERNAL MEDICINE

## 2021-03-08 PROCEDURE — 93798 PHYS/QHP OP CAR RHAB W/ECG: CPT

## 2021-03-08 PROCEDURE — 1123F ACP DISCUSS/DSCN MKR DOCD: CPT | Performed by: INTERNAL MEDICINE

## 2021-03-08 ASSESSMENT — PATIENT HEALTH QUESTIONNAIRE - PHQ9
SUM OF ALL RESPONSES TO PHQ QUESTIONS 1-9: 0
SUM OF ALL RESPONSES TO PHQ9 QUESTIONS 1 & 2: 0
SUM OF ALL RESPONSES TO PHQ QUESTIONS 1-9: 0
SUM OF ALL RESPONSES TO PHQ QUESTIONS 1-9: 0
2. FEELING DOWN, DEPRESSED OR HOPELESS: 0

## 2021-03-08 ASSESSMENT — ENCOUNTER SYMPTOMS
BLOOD IN STOOL: 0
BACK PAIN: 0
CONSTIPATION: 0
SHORTNESS OF BREATH: 0
EYE PAIN: 0
VOMITING: 0
DIARRHEA: 0
COUGH: 0
NAUSEA: 0
ABDOMINAL PAIN: 0

## 2021-03-08 NOTE — PROGRESS NOTES
[address]  [address] [address]  Dept: [phone]  Dept Fax: [phone]  Loc: [phone]     Allayne Cogan is a 72 y.o. male who presents today for his medical conditions/complaintsas noted below. Allayne Cogan is c/o of   Chief Complaint   Patient presents with    Congestive Heart Failure     3 month    Hyperlipidemia    Coronary Artery Disease         HPI:     Congestive Heart Failure  Presents for follow-up (Systolic CHF) visit. Pertinent negatives include no abdominal pain, chest pain, chest pressure, palpitations or shortness of breath. The symptoms have been stable. His past medical history is significant for CAD. Compliance with total regimen is %. Compliance with diet is %. Compliance with exercise is %. Compliance with medications is %. Hyperlipidemia  This is a chronic problem. The current episode started more than 1 year ago. The problem is controlled. Recent lipid tests were reviewed and are variable. Pertinent negatives include no chest pain or shortness of breath. Coronary Artery Disease  Presents for follow-up visit. Pertinent negatives include no chest pain, chest pressure, dizziness, leg swelling, palpitations or shortness of breath. Risk factors include hyperlipidemia. His past medical history is significant for CHF. The symptoms have been stable. Compliance with diet is good. Compliance with exercise is good. Compliance with medications is good.        No results found for: LABA1C         No results found for: LABMICR  LDL Cholesterol (mg/dL)   Date Value   09/21/2020 120   10/28/2019 117   10/24/2018 111         AST (U/L)   Date Value   10/14/2020 14     ALT (U/L)   Date Value   10/14/2020 12     BUN (mg/dL)   Date Value   10/16/2020 34 (H)     BP Readings from Last 3 Encounters:   03/08/21 (!) 80/48   11/24/20 82/60   10/28/20 (!) 88/58 Past Medical History:   Diagnosis Date    Abnormal cardiovascular stress test 10/2020     Large inferior and inferoapical infarction, minimal faiza-infarct ischemia / Severely reduced LV systolic function.  Adenocarcinoma ([address]     Lip.  ENAMORADO (dyspnea on exertion)     H/O echocardiogram 10/08/2020    ECHO 10/8/2020: EF 20%, grade I DD, mild TR, RVSP is 50 mmHg, moderate PHTN.  Hyperlipidemia       Past Surgical History:   Procedure Laterality Date    CARDIAC CATHETERIZATION  10/14/2020    COLONOSCOPY  10/27/2010    Showed multiple internal hemorrhoids.  MOUTH SURGERY  01/2007    Lip surgery. Family History   Problem Relation Age of Onset    Heart Attack Father         Myocardial infarction in his 76s.  Diabetes Father     Glaucoma Mother           Social History     Tobacco Use    Smoking status: Never Smoker    Smokeless tobacco: Never Used   Substance Use Topics    Alcohol use: No         Current Outpatient Medications   Medication Sig Dispense Refill    warfarin (COUMADIN) 5 MG tablet Take 1 tablet by mouth daily Take 1.5 tablets (7.5 mg) by mouth on Tues, Thurs, Sat and Sun. Take 1 tablet ( 5 mg) by mouth MWF. Or as directed by INR results. 90 tablet 3    furosemide (LASIX) 20 MG tablet Take 1 tablet by mouth daily 90 tablet 3    metoprolol succinate (TOPROL XL) 25 MG extended release tablet Take 0.5 tablets by mouth nightly 45 tablet 3    sacubitril-valsartan (ENTRESTO) 24-26 MG per tablet Take 1 tablet by mouth 2 times daily 180 tablet 3    atorvastatin (LIPITOR) 20 MG tablet Take 1 tablet by mouth daily 90 tablet 3    aspirin 81 MG EC tablet Take 1 tablet by mouth daily 30 tablet 3     No current facility-administered medications for this visit.       Allergies   Allergen Reactions    Penicillins Other (See Comments)     As a child- unsure of reaction       Health Maintenance   Topic Date Due    Hepatitis C screen  Never done    HIV screen  Never done    Diabetes screen  Never done    Shingles Vaccine (1 of 2) Never done    Pneumococcal 65+ years Vaccine (1 of 1 - PPSV23) Never done    Colon cancer screen colonoscopy  10/27/2020    Annual Wellness Visit (AWV)  Never done    COVID-19 Vaccine (2 of 2 - Moderna series) 04/05/2021    Lipid screen  09/21/2021    Potassium monitoring  10/16/2021    Creatinine monitoring  10/16/2021    DTaP/Tdap/Td vaccine (2 - Td) 05/19/2022    Flu vaccine  Completed    Hepatitis A vaccine  Aged Out    Hepatitis B vaccine  Aged Out    Hib vaccine  Aged Out    Meningococcal (ACWY) vaccine  Aged Out       Subjective:      Review of Systems   Constitutional: Negative for chills and fever. HENT: Negative for hearing loss. Eyes: Negative for pain and visual disturbance. Respiratory: Negative for cough and shortness of breath. Cardiovascular: Negative for chest pain, palpitations and leg swelling. Gastrointestinal: Negative for abdominal pain, blood in stool, constipation, diarrhea, nausea and vomiting. Endocrine: Negative for cold intolerance, polydipsia and polyuria. Genitourinary: Negative for difficulty urinating, dysuria and hematuria. Musculoskeletal: Negative for arthralgias, back pain, gait problem and neck pain. Skin: Negative for pallor and rash. Neurological: Negative for dizziness, weakness, numbness and headaches. Hematological: Negative for adenopathy. Does not bruise/bleed easily. Psychiatric/Behavioral: Negative for confusion. The patient is not nervous/anxious. Objective:     Physical Exam  Vitals signs reviewed. Constitutional:       Appearance: He is well-developed. HENT:      Head: Normocephalic and atraumatic. Eyes:      Pupils: Pupils are equal, round, and reactive to light. Neck:      Musculoskeletal: Neck supple. Cardiovascular:      Rate and Rhythm: Normal rate and regular rhythm. Heart sounds: No murmur. No friction rub. No gallop.     Pulmonary:      Effort: Pulmonary effort is normal.      Breath sounds: Normal breath sounds. No wheezing or rales. Abdominal:      General: There is no distension. Palpations: Abdomen is soft. There is no mass. Tenderness: There is no abdominal tenderness. There is no rebound. Musculoskeletal: Normal range of motion. Lymphadenopathy:      Cervical: No cervical adenopathy. Skin:     General: Skin is warm and dry. Findings: No rash. Neurological:      Mental Status: He is alert and oriented to person, place, and time. Cranial Nerves: No cranial nerve deficit (grossly). Psychiatric:         Thought Content: Thought content normal.        BP (!) 80/48 (Site: Left Upper Arm, Position: Sitting, Cuff Size: Medium Adult)   Pulse 64   Resp 16   Ht 6' 2\" (1.88 m)   Wt 214 lb (97.1 kg)   BMI 27.48 kg/m²     Assessment:       Diagnosis Orders   1. Chronic systolic congestive heart failure [address]     2. Coronary artery disease due to lipid rich plaque     3. Hyperlipidemia, unspecified hyperlipidemia type     I reviewed multiple test results for this appointment. 1/14/2021 Covid test was negative. 12/21/2020 echocardiogram showed LVEF 30%. No residual thrombus in the apex. 2/10/2021 okay INR 2.5. Patient told to continue his Coumadin and Lasix. He gets INR checks through the anticoag clinic. Plan:       Return in about 3 months (around 6/8/2021) for medicare AWV, CHF, Hyperlipidemia, CAD. No orders of the defined types were placed in this encounter. No orders of the defined types were placed in this encounter. Patientgiven educational materials - see patient instructions. Discussed use, benefit,and side effects of prescribed medications. All patient questions answered. Ptvoiced understanding. Reviewed health maintenance. Instructed to continue currentmedications, diet and exercise. Patient agreed with treatment plan. Follow up asdirected.      Electronically signed by Veena Nina Suzanna Warren MD on 3/8/2021 at 10:50 AM

## 2021-03-10 ENCOUNTER — HOSPITAL ENCOUNTER (OUTPATIENT)
Dept: PHARMACY | Age: 66
Setting detail: THERAPIES SERIES
Discharge: HOME | End: 2021-03-10
Payer: MEDICARE

## 2021-03-10 ENCOUNTER — HOSPITAL ENCOUNTER (OUTPATIENT)
Dept: CARDIAC REHAB | Age: 66
Setting detail: THERAPIES SERIES
Discharge: HOME | End: 2021-03-10
Payer: MEDICARE

## 2021-03-10 DIAGNOSIS — I51.3 LEFT VENTRICULAR THROMBUS: ICD-10-CM

## 2021-03-10 LAB
INR BLD: 2.1
PROTIME: 25.4 SECONDS

## 2021-03-10 PROCEDURE — 85610 PROTHROMBIN TIME: CPT

## 2021-03-10 PROCEDURE — 93798 PHYS/QHP OP CAR RHAB W/ECG: CPT

## 2021-03-10 PROCEDURE — 99211 OFF/OP EST MAY X REQ PHY/QHP: CPT

## 2021-03-10 PROCEDURE — 36416 COLLJ CAPILLARY BLOOD SPEC: CPT

## 2021-03-10 NOTE — PATIENT INSTRUCTIONS
\"On day of next appointment, please screen for temperature and COVID-19 symptoms prior to you clinic appointment. If any symptoms present, please call [phone] to reschedule. \"

## 2021-03-10 NOTE — PROGRESS NOTES
ANTICOAGULATION SERVICE    Date of Clinic Visit:  3/10/2021    Yefri Hua is a 72 y.o. male who presents to clinic today for anticoagulation monitoring and adjustment. Recent INR Results:  Internal QC passed  Lab Results   Component Value Date    INR 2.1 03/10/2021    INR 2.5 02/10/2021       Current Warfarin Dosage:  Dosing Plan  As of 3/10/2021    TTR:  100.0 % (4.4 mo)   Full warfarin instructions:  5 mg every Mon, Wed, Fri; 7.5 mg all other days               Assessment/Plan:    Continue current regimen as INR remains stable. Next Clinic Appointment:  Return date  As of 3/10/2021    TTR:  100.0 % (4.4 mo)   Next INR check:  4/5/2021             Please call [facility] Anticoagulation Clinic at [phone] with any questions. Thanks!   Racheal Antonio [facility]  Anticoagulation Service Pharmacist  3/10/2021 9:06 AM  CLINICAL PHARMACY CONSULT: MED RECONCILIATION/REVIEW ADDENDUM    For Pharmacy Admin Tracking Only    PHSO: No  Total # of Interventions Recommended: 0      Total Interventions Accepted: 0  Time Spent (min): Ab Kamara 219

## 2021-03-12 ENCOUNTER — HOSPITAL ENCOUNTER (OUTPATIENT)
Dept: CARDIAC REHAB | Age: 66
Setting detail: THERAPIES SERIES
Discharge: HOME | End: 2021-03-12
Payer: MEDICARE

## 2021-03-12 PROCEDURE — 93798 PHYS/QHP OP CAR RHAB W/ECG: CPT

## 2021-03-15 ENCOUNTER — HOSPITAL ENCOUNTER (OUTPATIENT)
Dept: CARDIAC REHAB | Age: 66
Setting detail: THERAPIES SERIES
Discharge: HOME | End: 2021-03-15
Payer: MEDICARE

## 2021-03-15 PROCEDURE — 93798 PHYS/QHP OP CAR RHAB W/ECG: CPT

## 2021-03-17 ENCOUNTER — HOSPITAL ENCOUNTER (OUTPATIENT)
Dept: CARDIAC REHAB | Age: 66
Setting detail: THERAPIES SERIES
Discharge: HOME | End: 2021-03-17
Payer: MEDICARE

## 2021-03-17 PROCEDURE — 93798 PHYS/QHP OP CAR RHAB W/ECG: CPT

## 2021-03-19 ENCOUNTER — HOSPITAL ENCOUNTER (OUTPATIENT)
Dept: CARDIAC REHAB | Age: 66
Setting detail: THERAPIES SERIES
Discharge: HOME | End: 2021-03-19
Payer: MEDICARE

## 2021-03-19 PROCEDURE — 93798 PHYS/QHP OP CAR RHAB W/ECG: CPT

## 2021-03-24 ENCOUNTER — TELEPHONE (OUTPATIENT)
Dept: CARDIOLOGY | Age: 66
End: 2021-03-24

## 2021-03-24 ENCOUNTER — HOSPITAL ENCOUNTER (OUTPATIENT)
Dept: NON INVASIVE DIAGNOSTICS | Age: 66
Discharge: HOME | End: 2021-03-24
Payer: MEDICARE

## 2021-03-24 DIAGNOSIS — I42.0 DILATED CARDIOMYOPATHY (HCC): ICD-10-CM

## 2021-03-24 DIAGNOSIS — I42.0 DILATED CARDIOMYOPATHY (HCC): Primary | ICD-10-CM

## 2021-03-24 PROCEDURE — 93308 TTE F-UP OR LMTD: CPT

## 2021-03-26 ENCOUNTER — OFFICE VISIT (OUTPATIENT)
Dept: CARDIOLOGY | Age: 66
End: 2021-03-26
Payer: MEDICARE

## 2021-03-26 VITALS
WEIGHT: 201 LBS | HEIGHT: 74 IN | BODY MASS INDEX: 25.8 KG/M2 | HEART RATE: 63 BPM | DIASTOLIC BLOOD PRESSURE: 62 MMHG | SYSTOLIC BLOOD PRESSURE: 104 MMHG

## 2021-03-26 DIAGNOSIS — I50.22 CHRONIC SYSTOLIC CONGESTIVE HEART FAILURE (HCC): ICD-10-CM

## 2021-03-26 DIAGNOSIS — I42.8 NICM (NONISCHEMIC CARDIOMYOPATHY) (HCC): ICD-10-CM

## 2021-03-26 DIAGNOSIS — I42.0 DILATED CARDIOMYOPATHY (HCC): Primary | ICD-10-CM

## 2021-03-26 PROCEDURE — 4040F PNEUMOC VAC/ADMIN/RCVD: CPT | Performed by: INTERNAL MEDICINE

## 2021-03-26 PROCEDURE — 1123F ACP DISCUSS/DSCN MKR DOCD: CPT | Performed by: INTERNAL MEDICINE

## 2021-03-26 PROCEDURE — 99214 OFFICE O/P EST MOD 30 MIN: CPT | Performed by: INTERNAL MEDICINE

## 2021-03-26 PROCEDURE — G8417 CALC BMI ABV UP PARAM F/U: HCPCS | Performed by: INTERNAL MEDICINE

## 2021-03-26 PROCEDURE — G8482 FLU IMMUNIZE ORDER/ADMIN: HCPCS | Performed by: INTERNAL MEDICINE

## 2021-03-26 PROCEDURE — 3017F COLORECTAL CA SCREEN DOC REV: CPT | Performed by: INTERNAL MEDICINE

## 2021-03-26 PROCEDURE — 93010 ELECTROCARDIOGRAM REPORT: CPT | Performed by: INTERNAL MEDICINE

## 2021-03-26 PROCEDURE — 99213 OFFICE O/P EST LOW 20 MIN: CPT | Performed by: INTERNAL MEDICINE

## 2021-03-26 PROCEDURE — 93005 ELECTROCARDIOGRAM TRACING: CPT | Performed by: INTERNAL MEDICINE

## 2021-03-26 PROCEDURE — 1036F TOBACCO NON-USER: CPT | Performed by: INTERNAL MEDICINE

## 2021-03-26 PROCEDURE — G8427 DOCREV CUR MEDS BY ELIG CLIN: HCPCS | Performed by: INTERNAL MEDICINE

## 2021-03-26 ASSESSMENT — ENCOUNTER SYMPTOMS
BACK PAIN: 0
SHORTNESS OF BREATH: 0
ABDOMINAL PAIN: 0
EYE DISCHARGE: 0
CHEST TIGHTNESS: 0
EYE ITCHING: 0
ABDOMINAL DISTENTION: 0

## 2021-03-26 NOTE — PROGRESS NOTES
Today's Date: 3/26/2021  Patient's Name: Allayne Cogan  Patient's age: 72 y. o., 1955    Subjective: The patient is a 72y.o. year old, , male is in the office for dilated cardiomyopathy. Denies exertional chest pain or SOB, no dizziness or syncope and no palpitations. Past Medical History:   has a past medical history of Abnormal cardiovascular stress test, Adenocarcinoma ([address] ENAMORADO (dyspnea on exertion), H/O echocardiogram, and Hyperlipidemia. Past Surgical History:   has a past surgical history that includes Colonoscopy (10/27/2010); Mouth surgery (01/2007); and Cardiac catheterization (10/14/2020). Home Medications:  Prior to Admission medications    Medication Sig Start Date End Date Taking? Authorizing Provider   warfarin (COUMADIN) 5 MG tablet Take 1 tablet by mouth daily Take 1.5 tablets (7.5 mg) by mouth on Tues, Thurs, Sat and Sun. Take 1 tablet ( 5 mg) by mouth MWF. Or as directed by INR results. 2/17/21 8/16/21 Yes Aminta Slater MD   furosemide (LASIX) 20 MG tablet Take 1 tablet by mouth daily 10/28/20  Yes Nancy Chirinos MD   metoprolol succinate (TOPROL XL) 25 MG extended release tablet Take 0.5 tablets by mouth nightly 10/28/20  Yes Nancy Chirinos MD   sacubitril-valsartan (ENTRESTO) 24-26 MG per tablet Take 1 tablet by mouth 2 times daily 10/28/20  Yes Nancy Chirinos MD   atorvastatin (LIPITOR) 20 MG tablet Take 1 tablet by mouth daily 10/28/20  Yes Nancy Chirinos MD   aspirin 81 MG EC tablet Take 1 tablet by mouth daily 10/17/20  Yes Ayana Plunkett DO       Allergies:  Penicillins    Social History:   reports that he has never smoked. He has never used smokeless tobacco. He reports that he does not drink alcohol or use drugs. Review of Systems:  Review of Systems   Constitutional: Negative for chills, fatigue and fever. HENT: Negative for congestion and dental problem. Eyes: Negative for discharge and itching. Respiratory: Negative for chest tightness and shortness of breath. Cardiovascular: Negative for chest pain and palpitations. Gastrointestinal: Negative for abdominal distention and abdominal pain. Endocrine: Negative for cold intolerance and heat intolerance. Genitourinary: Negative for difficulty urinating and dysuria. Musculoskeletal: Negative for arthralgias, back pain and gait problem. Allergic/Immunologic: Negative for environmental allergies and immunocompromised state. Neurological: Negative for dizziness and facial asymmetry. Hematological: Negative for adenopathy. Psychiatric/Behavioral: Negative for agitation and behavioral problems. Physical Exam:  /62 (Site: Left Upper Arm, Position: Sitting, Cuff Size: Large Adult)   Pulse 63   Ht 6' 2\" (1.88 m)   Wt 201 lb (91.2 kg)   BMI 25.81 kg/m²    Physical Exam  Constitutional:       Appearance: Normal appearance. HENT:      Head: Normocephalic and atraumatic. Right Ear: Tympanic membrane normal.      Left Ear: Tympanic membrane normal.      Mouth/Throat:      Mouth: Mucous membranes are moist.   Neck:      Musculoskeletal: Normal range of motion and neck supple. No neck rigidity or muscular tenderness. Cardiovascular:      Rate and Rhythm: Normal rate and regular rhythm. Pulses: Normal pulses. Heart sounds: Normal heart sounds. No murmur. Pulmonary:      Effort: Pulmonary effort is normal. No respiratory distress. Breath sounds: Normal breath sounds. No stridor. Abdominal:      General: There is no distension. Palpations: There is no mass. Tenderness: There is no abdominal tenderness. Hernia: No hernia is present. Musculoskeletal:         General: No swelling or tenderness. Skin:     Capillary Refill: Capillary refill takes less than 2 seconds. Coloration: Skin is not jaundiced or pale. Neurological:      General: No focal deficit present.       Mental Status: He is alert and oriented to person, place, and time. Psychiatric:         Mood and Affect: Mood normal.         Behavior: Behavior normal.         Cardiac Data:      Labs:     CBC: No results for input(s): WBC, HGB, HCT, PLT in the last 72 hours. BMP:No results for input(s): NA, K, CO2, BUN, CREATININE, LABGLOM, GLUCOSE in the last 72 hours. PT/INR: No results for input(s): PROTIME, INR in the last 72 hours. FASTING LIPID PANEL:  Lab Results   Component Value Date    HDL 42 09/21/2020    TRIG 145 09/21/2020     LIVER PROFILE:No results for input(s): AST, ALT, LABALBU in the last 72 hours. IMPRESSION:    Patient Active Problem List   Diagnosis    Hyperlipidemia    Lipoma of torso    ENAMORADO (dyspnea on exertion)    Acute systolic CHF (congestive heart failure) (HCC)    Chest pain    Seasonal allergies    Nonischemic cardiomyopathy (HCC)    Congestive heart failure (HCC)    Apical mural thrombus    Left ventricular thrombus    Coronary artery disease due to lipid rich plaque       Assessment/Plan:  Dilated Cardiomyopathy. Cath on 10/2020 mild CAD. EF 15%. Echo 10/2020 EF 20-25%, LV clot. Ou Coumadin and maximal medical therapy. Repeat Echo on 12/2020 EF 30%. On Life vest, stable, no signs of volume overload, feels very good. Repeat echo 3/2021 EF 30-35%, discussed referral for AICD with him and his wife and agree to proceed. Benefits and risks explained and agree to proceed.      Frederic Knowles MD  [facility] Cardiology Consult           [phone]

## 2021-03-26 NOTE — PATIENT INSTRUCTIONS
Your Procedure Will Be Scheduled at:      [facility]    [address] [place], [address]   (located across from [facility])    Located on the main floor of the [facility]. Report to our reception desk by the [employer]. Parking is free. Handicap parking is available by the main entrance. You may also park in [place] on Level 2 and enter building on the bridge to [facility]. Take elevator to the main floor. · Our  will call you to schedule your procedure within a week. If you do not receive a phone call, please call the  directly at [phone] and leave a message, or call [place] office at [phone]. Date:______________________________    Arrival Time:________________________    Procedure Time:_____________________    Instructions:_____________________________      · Bring Photo I.D. and insurance cards. · Bring all Medications in the bottles. · Pack an overnight bag in case you are required to spend the night. · You will need someone to drive you home. · The  will instruct you on holding food and drink the night before or morning of your procedure. · You are to take your Medications, along with your Cardiac and/or Blood Pressure Medications, with small sips of water on the morning of your Procedure, unless instructed otherwise by your Physician. · If you need additional directions please call [phone]. · If you have any questions please feel free to call the [place] office at [phone].

## 2021-03-26 NOTE — TELEPHONE ENCOUNTER
Patient returned call to office stating that he has an appt today and will go over the results of the echo with Dr Jimbo Desai today.

## 2021-03-31 ENCOUNTER — TELEPHONE (OUTPATIENT)
Dept: CARDIOLOGY | Age: 66
End: 2021-03-31

## 2021-03-31 ENCOUNTER — HOSPITAL ENCOUNTER (OUTPATIENT)
Dept: CARDIAC REHAB | Age: 66
Discharge: HOME | End: 2021-03-31

## 2021-03-31 PROCEDURE — 9900000065 HC CARDIAC REHAB PHASE 3 - 1 VISIT

## 2021-03-31 NOTE — TELEPHONE ENCOUNTER
Patient has not had a call from [facility] about Defib yet, and was expecting this on Monday. Patient getting worried. Please follow up with [facility] and patient.

## 2021-04-05 ENCOUNTER — TELEPHONE (OUTPATIENT)
Dept: CARDIOLOGY | Age: 66
End: 2021-04-05

## 2021-04-05 ENCOUNTER — HOSPITAL ENCOUNTER (OUTPATIENT)
Dept: PHARMACY | Age: 66
Setting detail: THERAPIES SERIES
Discharge: HOME | End: 2021-04-05
Payer: MEDICARE

## 2021-04-05 ENCOUNTER — IMMUNIZATION (OUTPATIENT)
Dept: LAB | Age: 66
End: 2021-04-05
Payer: MEDICARE

## 2021-04-05 DIAGNOSIS — I51.3 LEFT VENTRICULAR THROMBUS: ICD-10-CM

## 2021-04-05 LAB — INR BLD: 2

## 2021-04-05 PROCEDURE — 99211 OFF/OP EST MAY X REQ PHY/QHP: CPT

## 2021-04-05 PROCEDURE — 91301 COVID-19, MODERNA VACCINE 100MCG/0.5ML DOSE: CPT

## 2021-04-05 PROCEDURE — 36416 COLLJ CAPILLARY BLOOD SPEC: CPT

## 2021-04-05 PROCEDURE — 85610 PROTHROMBIN TIME: CPT

## 2021-04-05 NOTE — PROGRESS NOTES
ANTICOAGULATION SERVICE    Date of Clinic Visit:  4/5/2021    Lissette Krueger is a 72 y.o. male who presents to clinic today for anticoagulation monitoring and adjustment. Recent INR Results:  Internal QC passed  Lab Results   Component Value Date    INR 2 04/05/2021    INR 2.1 03/10/2021       Current Warfarin Dosage:  Dosing Plan  As of 4/5/2021    TTR:  100.0 % (5.3 mo)   Full warfarin instructions:  4/7: Hold; 4/8: Hold; 4/9: Hold; 4/10: Hold; 4/11: Hold; 4/12: 10 mg; 4/13: 10 mg; 4/14: 7.5 mg; Otherwise 5 mg every Mon, Wed, Fri; 7.5 mg all other days               Assessment/Plan:    Continue current regimen as INR remains stable. Sarmad Smalls has surgery for Defibrillator placement next week. Will bridge with Lovenox. Recheck INR 3 days after restarting warfarin. I will give extra warfarin x 2 days after restarting. Next Clinic Appointment:  Return date  As of 4/5/2021    TTR:  100.0 % (5.3 mo)   Next INR check:  4/15/2021             Please call [facility] at [phone] with any questions. Thanks! Ariel Woodruff [facility]  Anticoagulation Service Pharmacist  4/5/2021 9:09 AM     CLINICAL PHARMACY CONSULT: MED RECONCILIATION/REVIEW ADDENDUM    For Pharmacy Admin Tracking Only    PHSO: No  Total # of Interventions Recommended: 0    - Maintenance Safety Lab Monitoring #: 1  Recommended intervention potential cost savings:   Accepted intervention potential cost savings:    Total Interventions Accepted: 0  Time Spent (min): 15    Ariel Woodruff, [address] [place] [address]

## 2021-04-06 NOTE — TELEPHONE ENCOUNTER
Spoke with pt, he states he called and got all instructions from [facility] and has no further questions at this time.

## 2021-04-07 ENCOUNTER — HOSPITAL ENCOUNTER (OUTPATIENT)
Dept: CARDIAC REHAB | Age: 66
Discharge: HOME | End: 2021-04-07

## 2021-04-07 PROCEDURE — 9900000065 HC CARDIAC REHAB PHASE 3 - 1 VISIT

## 2021-04-12 ENCOUNTER — HOSPITAL ENCOUNTER (OUTPATIENT)
Dept: CARDIAC CATH/INVASIVE PROCEDURES | Age: 66
Discharge: HOME | End: 2021-04-12
Payer: MEDICARE

## 2021-04-12 ENCOUNTER — HOSPITAL ENCOUNTER (OUTPATIENT)
Dept: GENERAL RADIOLOGY | Age: 66
Discharge: HOME | End: 2021-04-14
Payer: MEDICARE

## 2021-04-12 VITALS
TEMPERATURE: 97.9 F | RESPIRATION RATE: 16 BRPM | DIASTOLIC BLOOD PRESSURE: 54 MMHG | HEART RATE: 56 BPM | OXYGEN SATURATION: 96 % | SYSTOLIC BLOOD PRESSURE: 101 MMHG

## 2021-04-12 LAB
GFR NON-AFRICAN AMERICAN: >60 ML/MIN
GFR SERPL CREATININE-BSD FRML MDRD: >60 ML/MIN
GFR SERPL CREATININE-BSD FRML MDRD: NORMAL ML/MIN/{1.73_M2}
GLUCOSE BLD-MCNC: 104 MG/DL (ref 74–100)
POC CHLORIDE: 107 MMOL/L (ref 98–107)
POC CREATININE: 1.03 MG/DL (ref 0.51–1.19)
POC HEMATOCRIT: 39 % (ref 41–53)
POC HEMOGLOBIN: 13.4 G/DL (ref 13.5–17.5)
POC INR: 1
POC POTASSIUM: 4.4 MMOL/L (ref 3.5–4.5)
POC SODIUM: 142 MMOL/L (ref 138–146)
PROTHROMBIN TIME, POC: 11.7 SEC (ref 10.4–14.2)

## 2021-04-12 PROCEDURE — 6370000000 HC RX 637 (ALT 250 FOR IP)

## 2021-04-12 PROCEDURE — 2709999900 HC NON-CHARGEABLE SUPPLY

## 2021-04-12 PROCEDURE — 85610 PROTHROMBIN TIME: CPT

## 2021-04-12 PROCEDURE — 7100000001 HC PACU RECOVERY - ADDTL 15 MIN

## 2021-04-12 PROCEDURE — 7100000000 HC PACU RECOVERY - FIRST 15 MIN

## 2021-04-12 PROCEDURE — 84295 ASSAY OF SERUM SODIUM: CPT

## 2021-04-12 PROCEDURE — 82565 ASSAY OF CREATININE: CPT

## 2021-04-12 PROCEDURE — 85014 HEMATOCRIT: CPT

## 2021-04-12 PROCEDURE — 2500000003 HC RX 250 WO HCPCS

## 2021-04-12 PROCEDURE — 82435 ASSAY OF BLOOD CHLORIDE: CPT

## 2021-04-12 PROCEDURE — C1777 LEAD, AICD, ENDO SINGLE COIL: HCPCS

## 2021-04-12 PROCEDURE — 6360000002 HC RX W HCPCS

## 2021-04-12 PROCEDURE — 33249 INSJ/RPLCMT DEFIB W/LEAD(S): CPT | Performed by: INTERNAL MEDICINE

## 2021-04-12 PROCEDURE — 84132 ASSAY OF SERUM POTASSIUM: CPT

## 2021-04-12 PROCEDURE — C1894 INTRO/SHEATH, NON-LASER: HCPCS

## 2021-04-12 PROCEDURE — 82947 ASSAY GLUCOSE BLOOD QUANT: CPT

## 2021-04-12 PROCEDURE — C1722 AICD, SINGLE CHAMBER: HCPCS

## 2021-04-12 PROCEDURE — 71045 X-RAY EXAM CHEST 1 VIEW: CPT

## 2021-04-12 RX ORDER — SODIUM CHLORIDE 0.9 % (FLUSH) 0.9 %
5-40 SYRINGE (ML) INJECTION PRN
Status: DISCONTINUED | OUTPATIENT
Start: 2021-04-12 | End: 2021-04-13 | Stop reason: HOSPADM

## 2021-04-12 RX ORDER — SODIUM CHLORIDE 9 MG/ML
25 INJECTION, SOLUTION INTRAVENOUS PRN
Status: DISCONTINUED | OUTPATIENT
Start: 2021-04-12 | End: 2021-04-13 | Stop reason: HOSPADM

## 2021-04-12 RX ORDER — VANCOMYCIN HYDROCHLORIDE 1 G/200ML
1000 INJECTION, SOLUTION INTRAVENOUS ONCE
Status: DISCONTINUED | OUTPATIENT
Start: 2021-04-12 | End: 2021-04-13 | Stop reason: HOSPADM

## 2021-04-12 RX ORDER — SODIUM CHLORIDE 0.9 % (FLUSH) 0.9 %
5-40 SYRINGE (ML) INJECTION EVERY 12 HOURS SCHEDULED
Status: DISCONTINUED | OUTPATIENT
Start: 2021-04-12 | End: 2021-04-13 | Stop reason: HOSPADM

## 2021-04-12 RX ORDER — SODIUM CHLORIDE 9 MG/ML
INJECTION, SOLUTION INTRAVENOUS CONTINUOUS
Status: DISCONTINUED | OUTPATIENT
Start: 2021-04-12 | End: 2021-04-13 | Stop reason: HOSPADM

## 2021-04-12 RX ORDER — ACETAMINOPHEN 325 MG/1
650 TABLET ORAL ONCE
Status: COMPLETED | OUTPATIENT
Start: 2021-04-12 | End: 2021-04-12

## 2021-04-12 RX ADMIN — ACETAMINOPHEN 650 MG: 325 TABLET ORAL at 11:33

## 2021-04-12 RX ADMIN — SODIUM CHLORIDE: 9 INJECTION, SOLUTION INTRAVENOUS at 08:15

## 2021-04-12 ASSESSMENT — PAIN SCALES - GENERAL: PAINLEVEL_OUTOF10: 1

## 2021-04-12 NOTE — PROGRESS NOTES
Patient admitted, consent signed and questions answered. Patient ready for procedure. Call light to reach with side rails up 2 of 2. Chest hair clipped. Wife at bedside with patient. History and physical needs updated.     2% Chlorhexidine cloths used to prep site

## 2021-04-12 NOTE — H&P
[facility]  Pre- Procedure History and Physical/Update          Patient Name:  Benny Franklin  MRN:    [id number]  YOB: 1955  Date of evaluation:  4/12/2021       Please refer to the consult note / H&P completed by Dr. Kirti Sevilla on 3/26/21 in the medical record and note that:       [x] I have examined the patient and reviewed the H&P/Consult and there are no changes to be made to the assessment or plan. [] I have examined the patient and reviewed the H&P/Consult and have noted the following changes:        Past Medical History:   Diagnosis Date    Abnormal cardiovascular stress test 10/2020     Large inferior and inferoapical infarction, minimal faiza-infarct ischemia / Severely reduced LV systolic function.  Adenocarcinoma ([address]     Lip.  ENAMORADO (dyspnea on exertion)     H/O echocardiogram 10/08/2020    ECHO 10/8/2020: EF 20%, grade I DD, mild TR, RVSP is 50 mmHg, moderate PHTN.  Hyperlipidemia        Past Surgical History:   Procedure Laterality Date    CARDIAC CATHETERIZATION  10/14/2020    CARDIAC DEFIBRILLATOR PLACEMENT Left 04/12/2021    COLONOSCOPY  10/27/2010    Showed multiple internal hemorrhoids.  MOUTH SURGERY  01/2007    Lip surgery. reports that he has never smoked. He has never used smokeless tobacco. He reports that he does not drink alcohol or use drugs. Prior to Admission medications    Medication Sig Start Date End Date Taking?  Authorizing Provider   enoxaparin (LOVENOX) 100 MG/ML injection Inject 100 mg into the skin daily For 9 days stated 4/8/2021   Yes Historical Provider, MD   furosemide (LASIX) 20 MG tablet Take 1 tablet by mouth daily 10/28/20  Yes Wesly Gómez MD   metoprolol succinate (TOPROL XL) 25 MG extended release tablet Take 0.5 tablets by mouth nightly 10/28/20  Yes Wesly Gómez MD   sacubitril-valsartan (ENTRESTO) 24-26 MG per tablet Take 1 tablet by mouth 2 times daily 10/28/20  Yes Wesly Gómez MD   atorvastatin (LIPITOR) 20 MG tablet Take 1 tablet by mouth daily 10/28/20  Yes Autumn Fonseca MD   aspirin 81 MG EC tablet Take 1 tablet by mouth daily 10/17/20  Yes [place] Godfrey, DO   warfarin (COUMADIN) 5 MG tablet Take 1 tablet by mouth daily Take 1.5 tablets (7.5 mg) by mouth on Tues, Thurs, Sat and Sun. Take 1 tablet ( 5 mg) by mouth MWF. Or as directed by INR results. 2/17/21 8/16/21  Angie Pereira MD       Allergies   Allergen Reactions    Penicillins Other (See Comments)     As a child- unsure of reaction         REVIEW OF SYSTEMS:     A detailed review of system was performed as already noted and is otherwise as above. PHYSICAL EXAM:     Vitals:    04/12/21 0758   BP: 100/63   Pulse: 56   Resp: 15   Temp: 97.9 °F (36.6 °C)   SpO2: 98%        Constitutional and General Appearance: Alert. Not in acute stress. Respiratory:  · Clear to auscultation b/l. No wheeze or crackles. Cardiovascular:  · Regular rate and rhythm. No murmurs. · Jugular venous pulsation is normal  Abdomen:  · No masses or tenderness  Extremities:  · Lower extremity edema - No  · Skin: Warm and dry  Neurological:  · Alert and oriented. · Moves all extremities well      Active Problems:    * No active hospital problems. *  Resolved Problems:    * No resolved hospital problems. *      Assessment:  1. Dilated cardiomyopathy  2. HFrEF 15-20% in 10/2020, improved to 30-35% in 3/2021  3. LV clot - on coumadin  4. HLD      Plan:  1. Proceed with planned AICD implantation. 2. Further orders to follow. Pre Procedure Conscious Sedation Data:  ASA Class:                  [] I [x] II [] III [] IV     Mallampati Class:       [] I [x] II [] III [] IV      The risks, benefits, and alternatives of the prcoedure were discussed in detail with the patient. The patient agrees to proceed with the procedure, verbalizes understanding and signed consent.        Cirilo Stewart MD  Fellow, Cardiovascular Diseases    [facility]

## 2021-04-12 NOTE — OP NOTE
[facility]             Procedure Note  Single Chamber ICD         Today's Date:  4/12/2021  Patient name:  Shelia Valle  MRN:   [id number]  YOB: 1955  PCP:    Jameel Lockhart MD      Procedure: Single chamber AICD insertion    Indication: Primary prevention, NICM    Operators:  Primary: Dr. Abhishek Mueller (Attending)    :        Name Model Serial   Medtronic [id number]          Model # Serial #   RV-Lead [id number]           · Sedation monitoring  · Left Subclavian Angiogram   · RV lead placement  · Intra-operative  Lead Testing  · Pocket Creation  · Generator Implant  · Fluoro time: 1 min    Estimated Blood Loss:            [x] Minimal 10-25 cc   [] Minimal < 25 cc      [] Moderate 25-50 cc         [] >50 cc      Procedure  After the usual preparation of the left neck and chest, the patient was draped in the usual sterile manner. Local anesthesia was infused below the left clavicle from the midline laterally. An incision was made inferior and parallel to the clavicle. The incision was carried down to the fascia. A pocket was formed inferior using blunt dissection. A thin walled 18 gauge needle was used to puncture the left subclavian vein using the modified Seldinger technique. A guide wire was passed into the right heart under fluoroscopic control. This was repeated with a second guide wire. RV Lead Implant:  A 9 [place] sheath was then passed over the guide wire and the guide wire removed. The ventricular lead was advanced through the sheath into the right heart. The lead was then positioned in the right ventricle under fluoroscopic control. The acute pacing and sensing thresholds were measured and found to be satisfactory. Generator: The implanted leads were attached to the device using the setscrews. The pocket was irrigated with antibiotic solution.  The pulse generator and leads were coiled and placed in the pocket. Fluoroscopy was used to verify the final placement of the pacemaker and leads. The pocket was closed using multiple layers of suture and a dry sterile dressing was applied. There were no complications, patient tolerated the procedure well. The patient left the EP lab in stable condition. · Impression / Device:    1. Successful Implantation of Single chamber AICD (RV lead)      · Plan:    1. Telemetry monitoring  2. Post-Device protocol  3. Interrogate pacemaker prior to discharge. 4. CXR if needed  5. Incision and Device Check at [facility] in 7 days  6. Discharge if patient remains stable        Patient instructed to no showering or get the wound wet for 1 week, no driving for 1 week, no lifting more than 10 pounds for 4 weeks, no arm raising above the shoulder for 4 weeks. No lovenox or heparin at any dose is to be given. Electronically signed on 04/12/21 at 9:16 AM by:    Lui Isabel MD   Fellow, [address] [facility]       I interviewed the patient personally, and examined by me during the visit. I have reviewed the H&P/Consult / Progress note as completed, and made appropriate changes to the patient exam and treatment plans.       I have reviewed the case with resident / fellow    Patient treatment plan was explained to patient, correction in notes was made as appropriate, and discussed final arrangement based on  my evaluation and exam.    Additional Recommendations:      Nicolle Awan MD  [facility]

## 2021-04-15 ENCOUNTER — HOSPITAL ENCOUNTER (OUTPATIENT)
Dept: PHARMACY | Age: 66
Setting detail: THERAPIES SERIES
Discharge: HOME | End: 2021-04-15
Payer: MEDICARE

## 2021-04-15 DIAGNOSIS — I51.3 LEFT VENTRICULAR THROMBUS: ICD-10-CM

## 2021-04-15 LAB
INR BLD: 1.4
PROTIME: 17.2 SECONDS

## 2021-04-15 PROCEDURE — 85610 PROTHROMBIN TIME: CPT

## 2021-04-15 PROCEDURE — 36416 COLLJ CAPILLARY BLOOD SPEC: CPT

## 2021-04-15 PROCEDURE — 99211 OFF/OP EST MAY X REQ PHY/QHP: CPT

## 2021-04-15 NOTE — PATIENT INSTRUCTIONS
\"On day of next appointment, please screen for temperature and COVID-19 symptoms prior to you clinic appointment. If any symptoms present, please call [phone] to reschedule. \"

## 2021-04-21 ENCOUNTER — NURSE ONLY (OUTPATIENT)
Dept: CARDIOLOGY | Age: 66
End: 2021-04-21
Payer: MEDICARE

## 2021-04-21 DIAGNOSIS — I25.83 CORONARY ARTERY DISEASE DUE TO LIPID RICH PLAQUE: ICD-10-CM

## 2021-04-21 DIAGNOSIS — I50.21 ACUTE SYSTOLIC CHF (CONGESTIVE HEART FAILURE) (HCC): ICD-10-CM

## 2021-04-21 DIAGNOSIS — I50.22 CHRONIC SYSTOLIC CONGESTIVE HEART FAILURE (HCC): ICD-10-CM

## 2021-04-21 DIAGNOSIS — I25.10 CORONARY ARTERY DISEASE DUE TO LIPID RICH PLAQUE: ICD-10-CM

## 2021-04-21 DIAGNOSIS — E78.5 HYPERLIPIDEMIA, UNSPECIFIED HYPERLIPIDEMIA TYPE: ICD-10-CM

## 2021-04-21 DIAGNOSIS — I42.8 NONISCHEMIC CARDIOMYOPATHY (HCC): ICD-10-CM

## 2021-04-21 PROCEDURE — 99212 OFFICE O/P EST SF 10 MIN: CPT

## 2021-04-21 NOTE — PATIENT INSTRUCTIONS
1. Can take shower at 1 week post.  Start with back to water. Let water flow over shoulder down chest.  Don't scrub. 2. Let steri-strips fall off on their own. 3. Do not drive until your follow up appointment with the doctor. 4. Do not lift more than 5 pounds for one month at least.  Then may increase weight slowly. 5. Do not raise arm above head for 4-6 weeks. 6. No swimming or golfing for 4-6 weeks. 7. Use cell phone on opposite side.

## 2021-04-21 NOTE — PROGRESS NOTES
Pt presents for wound check/staple removal post Medtronic device implant. Incision site appears clean and dry, mildly pink around staples, no s/s infection, no drainage or edema. 9 staples removed with no complication. Wound care instructions reviewed with pt and wife and device check appt booked.

## 2021-04-30 ENCOUNTER — HOSPITAL ENCOUNTER (OUTPATIENT)
Dept: PHARMACY | Age: 66
Setting detail: THERAPIES SERIES
Discharge: HOME | End: 2021-04-30
Payer: MEDICARE

## 2021-04-30 ENCOUNTER — OFFICE VISIT (OUTPATIENT)
Dept: CARDIOLOGY | Age: 66
End: 2021-04-30
Payer: MEDICARE

## 2021-04-30 VITALS
DIASTOLIC BLOOD PRESSURE: 59 MMHG | BODY MASS INDEX: 25.8 KG/M2 | HEART RATE: 55 BPM | WEIGHT: 201 LBS | HEIGHT: 74 IN | SYSTOLIC BLOOD PRESSURE: 97 MMHG

## 2021-04-30 DIAGNOSIS — I51.3 LEFT VENTRICULAR THROMBUS: ICD-10-CM

## 2021-04-30 DIAGNOSIS — I50.22 CHRONIC SYSTOLIC CONGESTIVE HEART FAILURE (HCC): ICD-10-CM

## 2021-04-30 DIAGNOSIS — I42.8 NONISCHEMIC CARDIOMYOPATHY (HCC): Primary | ICD-10-CM

## 2021-04-30 LAB
INR BLD: 2.2
PROTIME: 26.9 SECONDS

## 2021-04-30 PROCEDURE — 36416 COLLJ CAPILLARY BLOOD SPEC: CPT

## 2021-04-30 PROCEDURE — 93010 ELECTROCARDIOGRAM REPORT: CPT | Performed by: INTERNAL MEDICINE

## 2021-04-30 PROCEDURE — 3017F COLORECTAL CA SCREEN DOC REV: CPT | Performed by: INTERNAL MEDICINE

## 2021-04-30 PROCEDURE — G8427 DOCREV CUR MEDS BY ELIG CLIN: HCPCS | Performed by: INTERNAL MEDICINE

## 2021-04-30 PROCEDURE — 99214 OFFICE O/P EST MOD 30 MIN: CPT | Performed by: INTERNAL MEDICINE

## 2021-04-30 PROCEDURE — 85610 PROTHROMBIN TIME: CPT

## 2021-04-30 PROCEDURE — 99211 OFF/OP EST MAY X REQ PHY/QHP: CPT

## 2021-04-30 PROCEDURE — 1123F ACP DISCUSS/DSCN MKR DOCD: CPT | Performed by: INTERNAL MEDICINE

## 2021-04-30 PROCEDURE — 1036F TOBACCO NON-USER: CPT | Performed by: INTERNAL MEDICINE

## 2021-04-30 PROCEDURE — 93005 ELECTROCARDIOGRAM TRACING: CPT | Performed by: INTERNAL MEDICINE

## 2021-04-30 PROCEDURE — G8417 CALC BMI ABV UP PARAM F/U: HCPCS | Performed by: INTERNAL MEDICINE

## 2021-04-30 PROCEDURE — 4040F PNEUMOC VAC/ADMIN/RCVD: CPT | Performed by: INTERNAL MEDICINE

## 2021-04-30 ASSESSMENT — ENCOUNTER SYMPTOMS
CHEST TIGHTNESS: 0
EYE DISCHARGE: 0
SHORTNESS OF BREATH: 0
EYE ITCHING: 0
ABDOMINAL PAIN: 0
ABDOMINAL DISTENTION: 0

## 2021-04-30 NOTE — PROGRESS NOTES
Today's Date: 4/30/2021  Patient's Name: Manda Paula  Patient's age: 72 y. o., 1955    Subjective: The patient is a 72y.o. year old, , male is in the office for dilated cardiomyopathy. Denies exertional chest pain or SOB, no dizziness or syncope and no palpitations.       Past Medical History:   has a past medical history of Abnormal cardiovascular stress test, Adenocarcinoma (HCC), ENAMORADO (dyspnea on exertion), H/O echocardiogram, and Hyperlipidemia. Past Surgical History:   has a past surgical history that includes Colonoscopy (10/27/2010); Mouth surgery (01/2007); Cardiac catheterization (10/14/2020); and Cardiac defibrillator placement (Left, 04/12/2021). Home Medications:  Prior to Admission medications    Medication Sig Start Date End Date Taking? Authorizing Provider   warfarin (COUMADIN) 5 MG tablet Take 1 tablet by mouth daily Take 1.5 tablets (7.5 mg) by mouth on Tues, Thurs, Sat and Sun. Take 1 tablet ( 5 mg) by mouth MWF. Or as directed by INR results. 2/17/21 8/16/21 Yes Junie Cuevas MD   furosemide (LASIX) 20 MG tablet Take 1 tablet by mouth daily 10/28/20  Yes Juliet Sanchez MD   metoprolol succinate (TOPROL XL) 25 MG extended release tablet Take 0.5 tablets by mouth nightly 10/28/20  Yes Juliet Sanchez MD   sacubitril-valsartan (ENTRESTO) 24-26 MG per tablet Take 1 tablet by mouth 2 times daily 10/28/20  Yes Juliet Sanchez MD   atorvastatin (LIPITOR) 20 MG tablet Take 1 tablet by mouth daily 10/28/20  Yes Juliet Sanchez MD   aspirin 81 MG EC tablet Take 1 tablet by mouth daily 10/17/20  Yes Alveta Stains, DO       Allergies:  Penicillins    Social History:   reports that he has never smoked. He has never used smokeless tobacco. He reports that he does not drink alcohol or use drugs. Review of Systems:  Review of Systems   Constitutional: Negative for chills, fatigue and fever. HENT: Negative for congestion and dental problem. Eyes: Negative for discharge and itching. Respiratory: Negative for chest tightness and shortness of breath. Cardiovascular: Negative for chest pain and palpitations. Gastrointestinal: Negative for abdominal distention and abdominal pain. Endocrine: Negative for cold intolerance and heat intolerance. Genitourinary: Negative for difficulty urinating and dysuria. Neurological: Negative for dizziness and facial asymmetry. Psychiatric/Behavioral: Negative for agitation and behavioral problems. Physical Exam:  BP (!) 97/59   Pulse 55   Ht 6' 2\" (1.88 m)   Wt 201 lb (91.2 kg)   BMI 25.81 kg/m²    Physical Exam  Constitutional:       Appearance: Normal appearance. HENT:      Head: Normocephalic and atraumatic. Nose: Nose normal.      Mouth/Throat:      Mouth: Mucous membranes are moist.   Eyes:      Pupils: Pupils are equal, round, and reactive to light. Cardiovascular:      Rate and Rhythm: Normal rate and regular rhythm. Pulses: Normal pulses. Heart sounds: Normal heart sounds. No murmur. Pulmonary:      Effort: Pulmonary effort is normal.      Breath sounds: Normal breath sounds. Abdominal:      General: There is no distension. Palpations: There is no mass. Musculoskeletal:         General: No swelling or tenderness. Skin:     Capillary Refill: Capillary refill takes less than 2 seconds. Coloration: Skin is not jaundiced or pale. Neurological:      General: No focal deficit present. Mental Status: He is alert and oriented to person, place, and time. Cranial Nerves: No cranial nerve deficit. Sensory: No sensory deficit. Psychiatric:         Mood and Affect: Mood normal.         Behavior: Behavior normal.         Cardiac Data:      Labs:     CBC: No results for input(s): WBC, HGB, HCT, PLT in the last 72 hours. BMP:No results for input(s): NA, K, CO2, BUN, CREATININE, LABGLOM, GLUCOSE in the last 72 hours.   PT/INR:   Recent Labs 04/30/21   PROTIME 26.9   INR 2.2     FASTING LIPID PANEL:  Lab Results   Component Value Date    HDL 42 09/21/2020    TRIG 145 09/21/2020     LIVER PROFILE:No results for input(s): AST, ALT, LABALBU in the last 72 hours. IMPRESSION:    Patient Active Problem List   Diagnosis    Hyperlipidemia    Lipoma of torso    ENAMORADO (dyspnea on exertion)    Acute systolic CHF (congestive heart failure) (HCC)    Chest pain    Seasonal allergies    Nonischemic cardiomyopathy (HCC)    Congestive heart failure (HCC)    Apical mural thrombus    Left ventricular thrombus    Coronary artery disease due to lipid rich plaque       Assessment/Plan:  Dilated Cardiomyopathy. Cath on 10/2020 mild CAD.  EF 15%. Echo 10/2020 EF 20-25%, LV clot. Ou Coumadin and maximal medical therapy. Repeat Echo on 12/2020 EF 30%. On Life vest, stable, no signs of volume overload, feels very good. Repeat echo 3/2021 EF 30-35%. S/p AICD placement on 4/12/2021. Feels good. Continue current medications. Will repeat echo in future, if resolution of LV clot, will stop Coumadin.   Will follow on Device check.          Alexandria Epstein MD  [facility]           [phone]

## 2021-04-30 NOTE — PROGRESS NOTES
ANTICOAGULATION SERVICE    Date of Clinic Visit:  4/30/2021    Jamie West is a 72 y.o. male who presents to clinic today for anticoagulation monitoring and adjustment. Recent INR Results:  Internal QC passed  Lab Results   Component Value Date    INR 2.2 04/30/2021    INR 1.4 04/15/2021       Current Warfarin Dosage:  Dosing Plan  As of 4/30/2021    TTR:  88.5 % (6.1 mo)   Full warfarin instructions:  5 mg every Mon, Wed, Fri; 7.5 mg all other days               Assessment/Plan:    Continue current regimen as INR remains stable. Next Clinic Appointment:  Return date  As of 4/30/2021    TTR:  88.5 % (6.1 mo)   Next INR check:  5/28/2021             Please call [facility]ticoagulation Clinic at [phone] with any questions. Thanks!   Pepe Ordaz [facility]  Anticoagulation Service Pharmacist  4/30/2021 8:05 AM  For Pharmacy Admin Tracking Only     Intervention Detail:Time Spent (min): 15

## 2021-04-30 NOTE — PATIENT INSTRUCTIONS
\"On day of next appointment, please screen for temperature and COVID-19 symptoms prior to you clinic appointment. If any symptoms present, please call [phone] to reschedule. \"

## 2021-05-05 ENCOUNTER — HOSPITAL ENCOUNTER (OUTPATIENT)
Dept: CARDIAC REHAB | Age: 66
Discharge: HOME | End: 2021-05-05

## 2021-05-05 PROCEDURE — 9900000065 HC CARDIAC REHAB PHASE 3 - 1 VISIT

## 2021-05-19 ENCOUNTER — HOSPITAL ENCOUNTER (OUTPATIENT)
Dept: CARDIAC REHAB | Age: 66
Discharge: HOME | End: 2021-05-19

## 2021-05-19 PROCEDURE — 9900000065 HC CARDIAC REHAB PHASE 3 - 1 VISIT

## 2021-05-26 ENCOUNTER — HOSPITAL ENCOUNTER (OUTPATIENT)
Dept: CARDIAC REHAB | Age: 66
Discharge: HOME | End: 2021-05-26

## 2021-05-26 PROCEDURE — 9900000065 HC CARDIAC REHAB PHASE 3 - 1 VISIT

## 2021-05-27 ENCOUNTER — PROCEDURE VISIT (OUTPATIENT)
Dept: CARDIOLOGY | Age: 66
End: 2021-05-27
Payer: MEDICARE

## 2021-05-27 DIAGNOSIS — I25.10 CORONARY ARTERY DISEASE DUE TO LIPID RICH PLAQUE: ICD-10-CM

## 2021-05-27 DIAGNOSIS — I50.21 ACUTE SYSTOLIC CHF (CONGESTIVE HEART FAILURE) (HCC): ICD-10-CM

## 2021-05-27 DIAGNOSIS — R06.09 DOE (DYSPNEA ON EXERTION): ICD-10-CM

## 2021-05-27 DIAGNOSIS — I25.83 CORONARY ARTERY DISEASE DUE TO LIPID RICH PLAQUE: ICD-10-CM

## 2021-05-27 DIAGNOSIS — Z95.810 AICD (AUTOMATIC CARDIOVERTER/DEFIBRILLATOR) PRESENT: ICD-10-CM

## 2021-05-27 DIAGNOSIS — I50.22 CHRONIC SYSTOLIC CONGESTIVE HEART FAILURE (HCC): ICD-10-CM

## 2021-05-27 DIAGNOSIS — E78.5 HYPERLIPIDEMIA, UNSPECIFIED HYPERLIPIDEMIA TYPE: ICD-10-CM

## 2021-05-27 DIAGNOSIS — I42.8 NONISCHEMIC CARDIOMYOPATHY (HCC): ICD-10-CM

## 2021-05-27 PROCEDURE — 93289 INTERROG DEVICE EVAL HEART: CPT | Performed by: INTERNAL MEDICINE

## 2021-05-28 ENCOUNTER — HOSPITAL ENCOUNTER (OUTPATIENT)
Dept: PHARMACY | Age: 66
Setting detail: THERAPIES SERIES
Discharge: HOME | End: 2021-05-28
Payer: MEDICARE

## 2021-05-28 DIAGNOSIS — I51.3 LEFT VENTRICULAR THROMBUS: Primary | ICD-10-CM

## 2021-05-28 LAB
INR BLD: 2.6
PROTIME: 31.6 SECONDS

## 2021-05-28 PROCEDURE — 85610 PROTHROMBIN TIME: CPT

## 2021-05-28 PROCEDURE — 99211 OFF/OP EST MAY X REQ PHY/QHP: CPT

## 2021-05-28 PROCEDURE — 36416 COLLJ CAPILLARY BLOOD SPEC: CPT

## 2021-05-28 NOTE — PROGRESS NOTES
ANTICOAGULATION SERVICE    Date of Clinic Visit:  5/28/2021    Sabino Benitez is a 72 y.o. male who presents to clinic today for anticoagulation monitoring and adjustment. Recent INR Results:  Internal QC passed  Lab Results   Component Value Date    INR 2.6 05/28/2021    INR 2.2 04/30/2021       Current Warfarin Dosage:  Dosing Plan  As of 5/28/2021    TTR:  90.1 % (7 mo)   Full warfarin instructions:  5 mg every Mon, Wed, Fri; 7.5 mg all other days               Assessment/Plan:    Continue current regimen as INR remains stable. Next Clinic Appointment:  Return date  As of 5/28/2021    TTR:  90.1 % (7 mo)   Next INR check:  6/25/2021             Please call [facility]ticoagulation Clinic at [phone] with any questions. Thanks!   Stanislav Granados, [facility]  Anticoagulation Service Pharmacist  5/28/2021 8:09 AM  For Pharmacy Admin Tracking Only     Intervention Detail: 0    Total # of Interventions Recommended: 0   Total # of Interventions Accepted: 0   Time Spent (min): 15

## 2021-06-02 ENCOUNTER — HOSPITAL ENCOUNTER (OUTPATIENT)
Dept: CARDIAC REHAB | Age: 66
Discharge: HOME | End: 2021-06-02

## 2021-06-02 PROCEDURE — 9900000065 HC CARDIAC REHAB PHASE 3 - 1 VISIT

## 2021-06-09 ENCOUNTER — HOSPITAL ENCOUNTER (OUTPATIENT)
Dept: CARDIAC REHAB | Age: 66
Discharge: HOME | End: 2021-06-09

## 2021-06-09 PROCEDURE — 9900000065 HC CARDIAC REHAB PHASE 3 - 1 VISIT

## 2021-06-16 ENCOUNTER — HOSPITAL ENCOUNTER (OUTPATIENT)
Dept: CARDIAC REHAB | Age: 66
Discharge: HOME | End: 2021-06-16

## 2021-06-16 PROCEDURE — 9900000065 HC CARDIAC REHAB PHASE 3 - 1 VISIT

## 2021-06-23 ENCOUNTER — HOSPITAL ENCOUNTER (OUTPATIENT)
Dept: CARDIAC REHAB | Age: 66
Discharge: HOME | End: 2021-06-23

## 2021-06-23 PROCEDURE — 9900000065 HC CARDIAC REHAB PHASE 3 - 1 VISIT

## 2021-06-25 ENCOUNTER — HOSPITAL ENCOUNTER (OUTPATIENT)
Dept: PHARMACY | Age: 66
Setting detail: THERAPIES SERIES
Discharge: HOME | End: 2021-06-25
Payer: MEDICARE

## 2021-06-25 DIAGNOSIS — I51.3 LEFT VENTRICULAR THROMBUS: Primary | ICD-10-CM

## 2021-06-25 LAB
INR BLD: 2.3
PROTIME: 28.2 SECONDS

## 2021-06-25 PROCEDURE — 85610 PROTHROMBIN TIME: CPT

## 2021-06-25 PROCEDURE — 99211 OFF/OP EST MAY X REQ PHY/QHP: CPT

## 2021-06-25 PROCEDURE — 36416 COLLJ CAPILLARY BLOOD SPEC: CPT

## 2021-06-25 NOTE — PATIENT INSTRUCTIONS
\"On day of next appointment, please screen for temperature and COVID-19 symptoms prior to you clinic appointment. If any symptoms present, please call [phone] to reschedule. \"

## 2021-06-25 NOTE — PROGRESS NOTES
ANTICOAGULATION SERVICE    Date of Clinic Visit:  6/25/2021    Saul Bergre is a 72 y.o. male who presents to clinic today for anticoagulation monitoring and adjustment. Recent INR Results:  Internal QC passed  Lab Results   Component Value Date    INR 2.3 06/25/2021    INR 2.6 05/28/2021       Current Warfarin Dosage:  Dosing Plan  As of 6/25/2021    TTR:  91.2 % (8 mo)   Full warfarin instructions:  5 mg every Mon, Wed, Fri; 7.5 mg all other days               Assessment/Plan:    Continue current regimen as INR remains stable. Next Clinic Appointment:  Return date  As of 6/25/2021    TTR:  91.2 % (8 mo)   Next INR check:  7/25/2021             Please call [facility] at [phone] with any questions. Thanks!   Uriel Parra, [facility]  Anticoagulation Service Pharmacist  6/25/2021 8:11 AM  For Pharmacy Admin Tracking Only        Total # of Interventions Recommended: 0   Total # of Interventions Accepted: 0   Time Spent (min): 15

## 2021-07-07 ENCOUNTER — HOSPITAL ENCOUNTER (OUTPATIENT)
Dept: CARDIAC REHAB | Age: 66
Discharge: HOME | End: 2021-07-07

## 2021-07-07 PROCEDURE — 9900000065 HC CARDIAC REHAB PHASE 3 - 1 VISIT

## 2021-07-08 ENCOUNTER — OFFICE VISIT (OUTPATIENT)
Dept: INTERNAL MEDICINE | Age: 66
End: 2021-07-08
Payer: MEDICARE

## 2021-07-08 VITALS
HEIGHT: 74 IN | WEIGHT: 209 LBS | SYSTOLIC BLOOD PRESSURE: 94 MMHG | RESPIRATION RATE: 16 BRPM | DIASTOLIC BLOOD PRESSURE: 64 MMHG | HEART RATE: 64 BPM | BODY MASS INDEX: 26.82 KG/M2

## 2021-07-08 DIAGNOSIS — Z00.00 MEDICARE ANNUAL WELLNESS VISIT, INITIAL: ICD-10-CM

## 2021-07-08 DIAGNOSIS — Z00.00 ROUTINE GENERAL MEDICAL EXAMINATION AT A HEALTH CARE FACILITY: Primary | ICD-10-CM

## 2021-07-08 DIAGNOSIS — I25.83 CORONARY ARTERY DISEASE DUE TO LIPID RICH PLAQUE: ICD-10-CM

## 2021-07-08 DIAGNOSIS — Z12.5 SPECIAL SCREENING FOR MALIGNANT NEOPLASM OF PROSTATE: ICD-10-CM

## 2021-07-08 DIAGNOSIS — E78.5 HYPERLIPIDEMIA, UNSPECIFIED HYPERLIPIDEMIA TYPE: ICD-10-CM

## 2021-07-08 DIAGNOSIS — I50.22 CHRONIC SYSTOLIC CONGESTIVE HEART FAILURE (HCC): ICD-10-CM

## 2021-07-08 DIAGNOSIS — I25.10 CORONARY ARTERY DISEASE DUE TO LIPID RICH PLAQUE: ICD-10-CM

## 2021-07-08 PROCEDURE — 99397 PER PM REEVAL EST PAT 65+ YR: CPT | Performed by: INTERNAL MEDICINE

## 2021-07-08 PROCEDURE — G0463 HOSPITAL OUTPT CLINIC VISIT: HCPCS | Performed by: INTERNAL MEDICINE

## 2021-07-08 PROCEDURE — 4040F PNEUMOC VAC/ADMIN/RCVD: CPT | Performed by: INTERNAL MEDICINE

## 2021-07-08 PROCEDURE — G0402 INITIAL PREVENTIVE EXAM: HCPCS | Performed by: INTERNAL MEDICINE

## 2021-07-08 PROCEDURE — 1123F ACP DISCUSS/DSCN MKR DOCD: CPT | Performed by: INTERNAL MEDICINE

## 2021-07-08 PROCEDURE — 3017F COLORECTAL CA SCREEN DOC REV: CPT | Performed by: INTERNAL MEDICINE

## 2021-07-08 SDOH — ECONOMIC STABILITY: FOOD INSECURITY: WITHIN THE PAST 12 MONTHS, YOU WORRIED THAT YOUR FOOD WOULD RUN OUT BEFORE YOU GOT MONEY TO BUY MORE.: NEVER TRUE

## 2021-07-08 SDOH — ECONOMIC STABILITY: FOOD INSECURITY: WITHIN THE PAST 12 MONTHS, THE FOOD YOU BOUGHT JUST DIDN'T LAST AND YOU DIDN'T HAVE MONEY TO GET MORE.: NEVER TRUE

## 2021-07-08 ASSESSMENT — ENCOUNTER SYMPTOMS
ABDOMINAL PAIN: 0
BLOOD IN STOOL: 0
EYE PAIN: 0
SHORTNESS OF BREATH: 0
COUGH: 0
VOMITING: 0
CONSTIPATION: 0
DIARRHEA: 0
NAUSEA: 0
BACK PAIN: 0

## 2021-07-08 ASSESSMENT — SOCIAL DETERMINANTS OF HEALTH (SDOH): HOW HARD IS IT FOR YOU TO PAY FOR THE VERY BASICS LIKE FOOD, HOUSING, MEDICAL CARE, AND HEATING?: NOT HARD AT ALL

## 2021-07-08 ASSESSMENT — PATIENT HEALTH QUESTIONNAIRE - PHQ9
SUM OF ALL RESPONSES TO PHQ QUESTIONS 1-9: 0
SUM OF ALL RESPONSES TO PHQ QUESTIONS 1-9: 0
1. LITTLE INTEREST OR PLEASURE IN DOING THINGS: 0
2. FEELING DOWN, DEPRESSED OR HOPELESS: 0
SUM OF ALL RESPONSES TO PHQ9 QUESTIONS 1 & 2: 0
SUM OF ALL RESPONSES TO PHQ QUESTIONS 1-9: 0

## 2021-07-08 ASSESSMENT — LIFESTYLE VARIABLES: HOW OFTEN DO YOU HAVE A DRINK CONTAINING ALCOHOL: 0

## 2021-07-08 NOTE — PATIENT INSTRUCTIONS
Personalized Preventive Plan for Taylor Rubio - 7/8/2021  Medicare offers a range of preventive health benefits. Some of the tests and screenings are paid in full while other may be subject to a deductible, co-insurance, and/or copay. Some of these benefits include a comprehensive review of your medical history including lifestyle, illnesses that may run in your family, and various assessments and screenings as appropriate. After reviewing your medical record and screening and assessments performed today your provider may have ordered immunizations, labs, imaging, and/or referrals for you. A list of these orders (if applicable) as well as your Preventive Care list are included within your After Visit Summary for your review. Other Preventive Recommendations:    · A preventive eye exam performed by an eye specialist is recommended every 1-2 years to screen for glaucoma; cataracts, macular degeneration, and other eye disorders. · A preventive dental visit is recommended every 6 months. · Try to get at least 150 minutes of exercise per week or 10,000 steps per day on a pedometer . · Order or download the FREE \"Exercise & Physical Activity: Your Everyday Guide\" from [employer]. Call 4-790.452.6818 or search [employer] online. · You need 7264-9119 mg of calcium and 8072-9392 IU of vitamin D per day. It is possible to meet your calcium requirement with diet alone, but a vitamin D supplement is usually necessary to meet this goal.  · When exposed to the sun, use a sunscreen that protects against both UVA and UVB radiation with an SPF of 30 or greater. Reapply every 2 to 3 hours or after sweating, drying off with a towel, or swimming. · Always wear a seat belt when traveling in a car. Always wear a helmet when riding a bicycle or motorcycle.

## 2021-07-08 NOTE — PROGRESS NOTES
[place] [id number]  Dept: [phone]  Dept Fax: [phone]  Loc: [phone]     Thalia Soriano is a 72 y.o. male who presents today for his medical conditions/complaintsas noted below. Thalia Soriano is c/o of   Chief Complaint   Patient presents with    Medicare AW     3 month    Congestive Heart Failure    Hyperlipidemia    Coronary Artery Disease         HPI:     Congestive Heart Failure  Presents for follow-up (systolic) visit. Pertinent negatives include no abdominal pain, chest pain, chest pressure, palpitations or shortness of breath. The symptoms have been stable. His past medical history is significant for CAD. Compliance with total regimen is %. Compliance with diet is %. Compliance with exercise is %. Compliance with medications is %. Hyperlipidemia  This is a chronic problem. The current episode started more than 1 year ago. The problem is controlled. Recent lipid tests were reviewed and are variable. Pertinent negatives include no chest pain or shortness of breath. Coronary Artery Disease  Presents for follow-up visit. Pertinent negatives include no chest pain, chest pressure, dizziness, leg swelling, palpitations or shortness of breath. Risk factors include hyperlipidemia. His past medical history is significant for CHF. The symptoms have been stable. Compliance with diet is good. Compliance with exercise is good. Compliance with medications is good.        No results found for: LABA1C         No results found for: LABMICR  LDL Cholesterol (mg/dL)   Date Value   09/21/2020 120   10/28/2019 117   10/24/2018 111         AST (U/L)   Date Value   10/14/2020 14     ALT (U/L)   Date Value   10/14/2020 12     BUN (mg/dL)   Date Value   10/16/2020 34 (H)     BP Readings from Last 3 Encounters:   07/08/21 94/64   04/30/21 (!) 97/59   04/12/21 (!) 101/54              Past Medical History:   Diagnosis Date    Abnormal cardiovascular stress test 10/2020     Large inferior and inferoapical infarction, minimal faiza-infarct ischemia / Severely reduced LV systolic function.  Adenocarcinoma ([address]     Lip.  ENAMORADO (dyspnea on exertion)     H/O echocardiogram 10/08/2020    ECHO 10/8/2020: EF 20%, grade I DD, mild TR, RVSP is 50 mmHg, moderate PHTN.  Hyperlipidemia       Past Surgical History:   Procedure Laterality Date    CARDIAC CATHETERIZATION  10/14/2020    CARDIAC DEFIBRILLATOR PLACEMENT Left 04/12/2021    COLONOSCOPY  10/27/2010    Showed multiple internal hemorrhoids.  MOUTH SURGERY  01/2007    Lip surgery. Family History   Problem Relation Age of Onset    Heart Attack Father         Myocardial infarction in his 76s.  Diabetes Father     Glaucoma Mother           Social History     Tobacco Use    Smoking status: Never Smoker    Smokeless tobacco: Never Used   Substance Use Topics    Alcohol use: No         Current Outpatient Medications   Medication Sig Dispense Refill    warfarin (COUMADIN) 5 MG tablet Take 1 tablet by mouth daily Take 1.5 tablets (7.5 mg) by mouth on Tues, Thurs, Sat and Sun. Take 1 tablet ( 5 mg) by mouth MWF. Or as directed by INR results. 90 tablet 3    furosemide (LASIX) 20 MG tablet Take 1 tablet by mouth daily 90 tablet 3    metoprolol succinate (TOPROL XL) 25 MG extended release tablet Take 0.5 tablets by mouth nightly 45 tablet 3    sacubitril-valsartan (ENTRESTO) 24-26 MG per tablet Take 1 tablet by mouth 2 times daily 180 tablet 3    atorvastatin (LIPITOR) 20 MG tablet Take 1 tablet by mouth daily 90 tablet 3     No current facility-administered medications for this visit.      Allergies   Allergen Reactions    Penicillins Other (See Comments)     As a child- unsure of reaction       Health Maintenance   Topic Date Due    Hepatitis C screen  Never done    HIV screen  Never done    Diabetes screen No wheezing or rales. Abdominal:      General: There is no distension. Palpations: Abdomen is soft. There is no mass. Tenderness: There is no abdominal tenderness. There is no rebound. Musculoskeletal:         General: Normal range of motion. Cervical back: Neck supple. Lymphadenopathy:      Cervical: No cervical adenopathy. Skin:     General: Skin is warm and dry. Findings: No rash. Neurological:      Mental Status: He is alert and oriented to person, place, and time. Cranial Nerves: No cranial nerve deficit (grossly). Psychiatric:         Thought Content: Thought content normal.        BP 94/64 (Site: Left Upper Arm, Position: Sitting, Cuff Size: Medium Adult)   Pulse 64   Resp 16   Ht 6' 2\" (1.88 m)   Wt 209 lb (94.8 kg)   BMI 26.83 kg/m²     Assessment:       Diagnosis Orders   1. Routine general medical examination at a health care facility     2. Chronic systolic congestive heart failure (HCC)  Comprehensive Metabolic Panel, Fasting   3. Coronary artery disease due to lipid rich plaque     4. Hyperlipidemia, unspecified hyperlipidemia type  Lipid Panel   5. Medicare annual wellness visit, initial     6. Special screening for malignant neoplasm of prostate  PSA Screening      Reviewed multiple test results for this appointment. 4/12/2021 normal creatinine 1.03.    4/12/2020 okay hemoglobin at 13.4.    6/25/2021 okay INR at 2.3. Patient told to continue Coumadin       Plan:       Return in about 6 months (around 1/8/2022) for CHF, Hyperlipidemia, CAD.     Orders Placed This Encounter   Procedures    Comprehensive Metabolic Panel, Fasting     Standing Status:   Future     Standing Expiration Date:   7/8/2022    PSA Screening     Standing Status:   Future     Standing Expiration Date:   7/8/2022    Lipid Panel     Standing Status:   Future     Standing Expiration Date:   7/8/2022     Order Specific Question:   Is Patient Fasting?/# of Hours     Answer:   yes     No orders of the defined types were placed in this encounter. Patientgiven educational materials - see patient instructions. Discussed use, benefit,and side effects of prescribed medications. All patient questions answered. Ptvoiced understanding. Reviewed health maintenance. Instructed to continue currentmedications, diet and exercise. Patient agreed with treatment plan. Follow up asdirected.      Electronically signed by Ra Ballard MD on 7/8/2021 at 9:41 AM

## 2021-07-08 NOTE — PROGRESS NOTES
Medicare Annual Wellness Visit  Name: Kalani Minus Date: 2021   MRN: [id number] Sex: Male   Age: 72 y.o. Ethnicity: Non-/Non    : 1955 Race: Shine Monreal is here for Medicare AWV (3 month), Congestive Heart Failure, Hyperlipidemia, and Coronary Artery Disease    Screenings for behavioral, psychosocial and functional/safety risks, and cognitive dysfunction are all negative except as indicated below. These results, as well as other patient data from the [address] form, are documented in Flowsheets linked to this Encounter. Allergies   Allergen Reactions    Penicillins Other (See Comments)     As a child- unsure of reaction         Prior to Visit Medications    Medication Sig Taking? Authorizing Provider   warfarin (COUMADIN) 5 MG tablet Take 1 tablet by mouth daily Take 1.5 tablets (7.5 mg) by mouth on Tues, Th, Sat and Sun. Take 1 tablet ( 5 mg) by mouth MWF. Or as directed by INR results. Yes Maxx Mijares MD   furosemide (LASIX) 20 MG tablet Take 1 tablet by mouth daily Yes Lynn Stock MD   metoprolol succinate (TOPROL XL) 25 MG extended release tablet Take 0.5 tablets by mouth nightly Yes Lynn Stock MD   sacubitril-valsartan (ENTRESTO) 24-26 MG per tablet Take 1 tablet by mouth 2 times daily Yes Lynn Stock MD   atorvastatin (LIPITOR) 20 MG tablet Take 1 tablet by mouth daily Yes Lynn Stock MD         Past Medical History:   Diagnosis Date    Abnormal cardiovascular stress test 10/2020     Large inferior and inferoapical infarction, minimal faiza-infarct ischemia / Severely reduced LV systolic function.  Adenocarcinoma ([address]     Lip.  ENAMORADO (dyspnea on exertion)     H/O echocardiogram 10/08/2020    ECHO 10/8/2020: EF 20%, grade I DD, mild TR, RVSP is 50 mmHg, moderate PHTN.      Hyperlipidemia        Past Surgical History:   Procedure Laterality Date    CARDIAC CATHETERIZATION  10/14/2020  CARDIAC DEFIBRILLATOR PLACEMENT Left 04/12/2021    COLONOSCOPY  10/27/2010    Showed multiple internal hemorrhoids.  MOUTH SURGERY  01/2007    Lip surgery. Family History   Problem Relation Age of Onset    Heart Attack Father         Myocardial infarction in his 76s.  Diabetes Father     Glaucoma Mother        CareTeam (Including outside providers/suppliers regularly involved in providing care):   Patient Care Team:  Samantha Welch MD as PCP - General (Internal Medicine)  Samantha Welch MD as PCP - [facility]    Wt Readings from Last 3 Encounters:   07/08/21 209 lb (94.8 kg)   04/30/21 201 lb (91.2 kg)   03/26/21 201 lb (91.2 kg)     Vitals:    07/08/21 0918   BP: 94/64   Site: Left Upper Arm   Position: Sitting   Cuff Size: Medium Adult   Pulse: 64   Resp: 16   Weight: 209 lb (94.8 kg)   Height: 6' 2\" (1.88 m)     Body mass index is 26.83 kg/m². Based upon direct observation of the patient, evaluation of cognition reveals none. Patient's complete Health Risk Assessment and screening values have been reviewed and are found in Flowsheets. The following problems were reviewed today and where indicated follow up appointments were made and/or referrals ordered. Positive Risk Factor Screenings with Interventions:            General Health and ACP:  General  In general, how would you say your health is?: Very Good  In the past 7 days, have you experienced any of the following? New or Increased Pain, New or Increased Fatigue, Loneliness, Social Isolation, Stress or Anger?: None of These  Do you get the social and emotional support that you need?: Yes  Do you have a Living Will?: (!) No  Advance Directives     Power of [address] Will ACP-Advance Directive ACP-Power of     Not on File Not on File Not on File Not on File      General Health Risk Interventions:  · No Living Will: declines living will at this time  · .         Personalized Preventive Plan   Current Health Maintenance Status  Immunization History   Administered Date(s) Administered    COVID-19, Moderna, PF, 100mcg/0.5mL 03/08/2021, 04/05/2021    Influenza, Quadv, IM, PF (6 mo and older Fluzone, Flulaval, Fluarix, and 3 yrs and older Afluria) 10/28/2019, 09/21/2020    Td, unspecified formulation 03/30/1995    Tdap (Boostrix, Adacel) 05/19/2012        Health Maintenance   Topic Date Due    Hepatitis C screen  Never done    HIV screen  Never done    Diabetes screen  Never done    Shingles Vaccine (1 of 2) Never done    Pneumococcal 65+ years Vaccine (1 of 1 - PPSV23) Never done    Colon cancer screen colonoscopy  10/27/2020    Annual Wellness Visit (AWV)  Never done    Flu vaccine (1) 09/01/2021    Lipid screen  09/21/2021    Potassium monitoring  04/12/2022    Creatinine monitoring  04/12/2022    DTaP/Tdap/Td vaccine (2 - Td or Tdap) 05/19/2022    COVID-19 Vaccine  Completed    Hepatitis A vaccine  Aged Out    Hepatitis B vaccine  Aged Out    Hib vaccine  Aged Out    Meningococcal (ACWY) vaccine  Aged Out     Recommendations for [employer] Due: see orders and patient instructions/AVS.  . Recommended screening schedule for the next 5-10 years is provided to the patient in written form: see Patient Instructions/AVS.    ICarmen LPN, 8/8/1771, performed the documented evaluation under the direct supervision of the attending physician. I, Dr. Vee Hernandez, directly supervised the performance of this Medicare annual wellness visit.

## 2021-07-14 ENCOUNTER — HOSPITAL ENCOUNTER (OUTPATIENT)
Dept: CARDIAC REHAB | Age: 66
Discharge: HOME | End: 2021-07-14

## 2021-07-14 PROCEDURE — 9900000065 HC CARDIAC REHAB PHASE 3 - 1 VISIT

## 2021-07-21 ENCOUNTER — HOSPITAL ENCOUNTER (OUTPATIENT)
Dept: CARDIAC REHAB | Age: 66
Discharge: HOME | End: 2021-07-21

## 2021-07-21 ENCOUNTER — HOSPITAL ENCOUNTER (OUTPATIENT)
Dept: PHARMACY | Age: 66
Setting detail: THERAPIES SERIES
Discharge: HOME | End: 2021-07-21
Payer: MEDICARE

## 2021-07-21 DIAGNOSIS — I51.3 LEFT VENTRICULAR THROMBUS: Primary | ICD-10-CM

## 2021-07-21 LAB
INR BLD: 1.8
PROTIME: 21.7 SECONDS

## 2021-07-21 PROCEDURE — 99211 OFF/OP EST MAY X REQ PHY/QHP: CPT

## 2021-07-21 PROCEDURE — 85610 PROTHROMBIN TIME: CPT

## 2021-07-21 PROCEDURE — 9900000065 HC CARDIAC REHAB PHASE 3 - 1 VISIT

## 2021-07-21 PROCEDURE — 36416 COLLJ CAPILLARY BLOOD SPEC: CPT

## 2021-07-21 NOTE — PATIENT INSTRUCTIONS
\"On day of next appointment, please screen for temperature and COVID-19 symptoms prior to you clinic appointment. If any symptoms present, please call [phone] to reschedule. \"

## 2021-07-21 NOTE — PROGRESS NOTES
ANTICOAGULATION SERVICE    Date of Clinic Visit:  7/21/2021    Lev Camacho is a 72 y.o. male who presents to clinic today for anticoagulation monitoring and adjustment. Recent INR Results:  Internal QC passed  Lab Results   Component Value Date    INR 1.8 07/21/2021    INR 2.3 06/25/2021       Current Warfarin Dosage:  Dosing Plan  As of 7/21/2021    TTR:  88.2 % (8.8 mo)   Full warfarin instructions:  7/21: 7.5 mg; Otherwise 5 mg every Mon, Wed, Fri; 7.5 mg all other days               Assessment/Plan:    Modify warfarin dose as noted above:Patient fell just below target will boost today's dose and re-check an normal.     Next Clinic Appointment:  Return date  As of 7/21/2021    TTR:  88.2 % (8.8 mo)   Next INR check:  8/18/2021             Please call [facility] at [phone] with any questions. Thanks!   Fauzia Mensah [facility]  Anticoagulation Service Pharmacist  7/21/2021 2:03 PM  For Pharmacy Admin Tracking Only     Intervention Detail: Dose Adjustment: 1, reason: Therapy Optimization   Total # of Interventions Recommended: 1   Total # of Interventions Accepted: 1   Time Spent (min): 20

## 2021-07-28 ENCOUNTER — HOSPITAL ENCOUNTER (OUTPATIENT)
Dept: CARDIAC REHAB | Age: 66
Discharge: HOME | End: 2021-07-28

## 2021-07-28 PROCEDURE — 9900000065 HC CARDIAC REHAB PHASE 3 - 1 VISIT

## 2021-07-30 ENCOUNTER — OFFICE VISIT (OUTPATIENT)
Dept: CARDIOLOGY | Age: 66
End: 2021-07-30
Payer: MEDICARE

## 2021-07-30 VITALS
SYSTOLIC BLOOD PRESSURE: 94 MMHG | WEIGHT: 206.8 LBS | DIASTOLIC BLOOD PRESSURE: 63 MMHG | HEIGHT: 74 IN | HEART RATE: 60 BPM | BODY MASS INDEX: 26.54 KG/M2

## 2021-07-30 DIAGNOSIS — E78.5 HYPERLIPIDEMIA, UNSPECIFIED HYPERLIPIDEMIA TYPE: ICD-10-CM

## 2021-07-30 DIAGNOSIS — I50.22 CHRONIC SYSTOLIC CONGESTIVE HEART FAILURE (HCC): ICD-10-CM

## 2021-07-30 DIAGNOSIS — R06.09 DOE (DYSPNEA ON EXERTION): Primary | ICD-10-CM

## 2021-07-30 DIAGNOSIS — I42.0 DILATED CARDIOMYOPATHY (HCC): ICD-10-CM

## 2021-07-30 DIAGNOSIS — Z95.810 AICD (AUTOMATIC CARDIOVERTER/DEFIBRILLATOR) PRESENT: ICD-10-CM

## 2021-07-30 DIAGNOSIS — R94.39 POSITIVE CARDIAC STRESS TEST: ICD-10-CM

## 2021-07-30 DIAGNOSIS — I42.8 NONISCHEMIC CARDIOMYOPATHY (HCC): ICD-10-CM

## 2021-07-30 PROCEDURE — 93010 ELECTROCARDIOGRAM REPORT: CPT | Performed by: INTERNAL MEDICINE

## 2021-07-30 PROCEDURE — 1036F TOBACCO NON-USER: CPT | Performed by: INTERNAL MEDICINE

## 2021-07-30 PROCEDURE — 99214 OFFICE O/P EST MOD 30 MIN: CPT | Performed by: INTERNAL MEDICINE

## 2021-07-30 PROCEDURE — 4040F PNEUMOC VAC/ADMIN/RCVD: CPT | Performed by: INTERNAL MEDICINE

## 2021-07-30 PROCEDURE — 1123F ACP DISCUSS/DSCN MKR DOCD: CPT | Performed by: INTERNAL MEDICINE

## 2021-07-30 PROCEDURE — G8427 DOCREV CUR MEDS BY ELIG CLIN: HCPCS | Performed by: INTERNAL MEDICINE

## 2021-07-30 PROCEDURE — G8417 CALC BMI ABV UP PARAM F/U: HCPCS | Performed by: INTERNAL MEDICINE

## 2021-07-30 PROCEDURE — 93005 ELECTROCARDIOGRAM TRACING: CPT | Performed by: INTERNAL MEDICINE

## 2021-07-30 PROCEDURE — 99213 OFFICE O/P EST LOW 20 MIN: CPT | Performed by: INTERNAL MEDICINE

## 2021-07-30 PROCEDURE — 3017F COLORECTAL CA SCREEN DOC REV: CPT | Performed by: INTERNAL MEDICINE

## 2021-07-30 RX ORDER — FUROSEMIDE 20 MG/1
TABLET ORAL
Qty: 270 TABLET | OUTPATIENT
Start: 2021-07-30

## 2021-07-30 RX ORDER — METOPROLOL SUCCINATE 25 MG/1
12.5 TABLET, EXTENDED RELEASE ORAL NIGHTLY
Qty: 45 TABLET | Refills: 3 | Status: SHIPPED | OUTPATIENT
Start: 2021-07-30 | End: 2022-07-29

## 2021-07-30 RX ORDER — FUROSEMIDE 20 MG/1
20 TABLET ORAL DAILY PRN
Qty: 90 TABLET | Refills: 3 | Status: SHIPPED | OUTPATIENT
Start: 2021-07-30 | End: 2021-10-06

## 2021-07-30 ASSESSMENT — ENCOUNTER SYMPTOMS
EYE ITCHING: 0
ABDOMINAL PAIN: 0
EYE DISCHARGE: 0
CHEST TIGHTNESS: 0
SHORTNESS OF BREATH: 0
ABDOMINAL DISTENTION: 0

## 2021-07-30 NOTE — PROGRESS NOTES
Today's Date: 7/30/2021  Patient's Name: Sade Melo  Patient's age: 72 y. o., 1955    CC: follow up for NICM. HPI:   The patient is a 72y.o. year old, , male is in the office for follow up. Continues to participate in physical therapy, once a week. Continues to work full time. No cp, sob, orthopnea, pnd, le edema, palpitations.      Past Medical History:   has a past medical history of Abnormal cardiovascular stress test, Adenocarcinoma (HCC), ENAMORADO (dyspnea on exertion), H/O echocardiogram, and Hyperlipidemia. Past Surgical History:   has a past surgical history that includes Colonoscopy (10/27/2010); Mouth surgery (01/2007); Cardiac catheterization (10/14/2020); and Cardiac defibrillator placement (Left, 04/12/2021). Home Medications:  Prior to Admission medications    Medication Sig Start Date End Date Taking? Authorizing Provider   warfarin (COUMADIN) 5 MG tablet Take 1 tablet by mouth daily Take 1.5 tablets (7.5 mg) by mouth on Tues, Thurs, Sat and Sun. Take 1 tablet ( 5 mg) by mouth MWF. Or as directed by INR results. 2/17/21 8/16/21 Yes Meryle Geralds, MD   furosemide (LASIX) 20 MG tablet Take 1 tablet by mouth daily 10/28/20  Yes Vonnie Stanley MD   metoprolol succinate (TOPROL XL) 25 MG extended release tablet Take 0.5 tablets by mouth nightly 10/28/20  Yes Vonnie Stanley MD   sacubitril-valsartan (ENTRESTO) 24-26 MG per tablet Take 1 tablet by mouth 2 times daily 10/28/20  Yes Vonnie Stanley MD   atorvastatin (LIPITOR) 20 MG tablet Take 1 tablet by mouth daily 10/28/20  Yes Vonnie Stanley MD       Allergies:  Penicillins    Social History:   reports that he has never smoked. He has never used smokeless tobacco. He reports that he does not drink alcohol and does not use drugs. Review of Systems:  Review of Systems   Constitutional: Negative for chills, fatigue and fever. HENT: Negative for congestion and dental problem.     Eyes: Negative for discharge and itching. Respiratory: Negative for chest tightness and shortness of breath. Cardiovascular: Negative for chest pain and palpitations. Gastrointestinal: Negative for abdominal distention and abdominal pain. Endocrine: Negative for cold intolerance and heat intolerance. Genitourinary: Negative for difficulty urinating and dysuria. Neurological: Negative for dizziness and facial asymmetry. Psychiatric/Behavioral: Negative for agitation and behavioral problems. Physical Exam:  BP 94/63 (Site: Left Upper Arm, Position: Sitting, Cuff Size: Medium Adult)   Pulse 60   Ht 6' 2\" (1.88 m)   Wt 206 lb 12.8 oz (93.8 kg)   BMI 26.55 kg/m²    Physical Exam  Constitutional:       Appearance: Normal appearance. HENT:      Head: Normocephalic and atraumatic. Nose: Nose normal.      Mouth/Throat:      Mouth: Mucous membranes are moist.   Eyes:      Pupils: Pupils are equal, round, and reactive to light. Cardiovascular:      Rate and Rhythm: Normal rate and regular rhythm. Pulses: Normal pulses. Heart sounds: Normal heart sounds. No murmur heard. Pulmonary:      Effort: Pulmonary effort is normal.      Breath sounds: Normal breath sounds. Abdominal:      General: There is no distension. Palpations: There is no mass. Musculoskeletal:         General: No swelling or tenderness. Skin:     Capillary Refill: Capillary refill takes less than 2 seconds. Coloration: Skin is not jaundiced or pale. Neurological:      General: No focal deficit present. Mental Status: He is alert and oriented to person, place, and time. Cranial Nerves: No cranial nerve deficit. Sensory: No sensory deficit. Psychiatric:         Mood and Affect: Mood normal.         Behavior: Behavior normal.         Cardiac Data:  ECG:  Sinus  Rhythm  -With rate variation cv = 14.   Diffuse low voltage.    -Inferior infarct -probably not recent  -Old anterior infarct  -Left axis secondary to infarct -consider anterior fascicular block. Stress 10/20:  1. Large inferior and inferoapical infarction, minimal faiza-infarct  ischemia. 2.  Severely reduced LV systolic function. Cath 10/20:   Mild CAD. 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 Severely reduced LV systolic function. LVEDP 28 mm Hg. Echo 10/20:  Global left ventricular systolic function is severely reduced. Estimated  ejection fraction is 20-25 % . Calculated EF via heart model is 24 %. An echogenic structure measuring approximately 1.75 cm x 2.12 cm is seen in  the apex of the left ventricle and appears like a thrombus formation. Definity was used to optimize images revealing an apical thrombus. Echo 12/20:  Mildly dilated left ventricle with moderate/severely reduced function. Left ventricular ejection fraction 30 %. Mild left ventricular hypertrophy. No evidence of apical thrombus. Left atrium is moderately dilated. Trivial pericardial effusion. Echo 3/21:  Limited 2-D echo for ejection fraction. Dilated left ventricle with global hypokinesis. Ejection fraction is estimated at 30-35%. Labs:     Lab Results   Component Value Date    CHOL 191 09/21/2020    TRIG 145 09/21/2020    HDL 42 09/21/2020    LDLCHOLESTEROL 120 09/21/2020    VLDL NOT REPORTED 09/21/2020    CHOLHDLRATIO 4.5 09/21/2020       Lab Results   Component Value Date     10/16/2020    K 3.7 10/16/2020     10/16/2020    CO2 23 10/16/2020    BUN 34 (H) 10/16/2020    CREATININE 1.03 04/12/2021    GLUCOSE 110 (H) 10/16/2020    CALCIUM 9.1 10/16/2020    PROT 6.9 10/14/2020    LABALBU 3.8 10/16/2020    BILITOT 0.93 10/14/2020    ALKPHOS 73 10/14/2020    AST 14 10/14/2020    ALT 12 10/14/2020    LABGLOM >60 04/12/2021    GFRAA >60 10/16/2020       Assessment/Plan:  1.  NICM, HFrEF, EF 30-35%- s/p AICD on 4/21  - continue entresto  - continue toprol  - continue AICD check every 6 months  - wants to try lasix as needed    2. LV apical thrombus  - on coumadin  - will check 2d Echo on 10/21  - if no thrombus and improved LVEF, will stop coumadin    3. HTN- controlled    4. DL- on statin     The patient is to continue heart healthy diet, weight loss and exercise as tolerated. Patient's medications and side effects were discussed. Medication refills were provided if needed. Follow up appointment timing was discussed. All questions and concerns were addressed to patient's satisfaction. The patient is to follow up in 6 months or sooner if necessary. Thank you for allowing me to participate in the care of this patient, please do not hesitate to call if you have any questions. Nae Cueto DO, [facility], [address], [place] [address] [facility]  [facility]. [facility]  [phone]

## 2021-08-04 ENCOUNTER — HOSPITAL ENCOUNTER (OUTPATIENT)
Dept: CARDIAC REHAB | Age: 66
Discharge: HOME | End: 2021-08-04

## 2021-08-04 PROCEDURE — 9900000065 HC CARDIAC REHAB PHASE 3 - 1 VISIT

## 2021-08-11 ENCOUNTER — HOSPITAL ENCOUNTER (OUTPATIENT)
Dept: CARDIAC REHAB | Age: 66
Discharge: HOME | End: 2021-08-11

## 2021-08-11 PROCEDURE — 9900000065 HC CARDIAC REHAB PHASE 3 - 1 VISIT

## 2021-08-18 ENCOUNTER — HOSPITAL ENCOUNTER (OUTPATIENT)
Dept: CARDIAC REHAB | Age: 66
Discharge: HOME | End: 2021-08-18

## 2021-08-18 ENCOUNTER — HOSPITAL ENCOUNTER (OUTPATIENT)
Dept: PHARMACY | Age: 66
Setting detail: THERAPIES SERIES
Discharge: HOME | End: 2021-08-18
Payer: MEDICARE

## 2021-08-18 DIAGNOSIS — I51.3 LEFT VENTRICULAR THROMBUS: Primary | ICD-10-CM

## 2021-08-18 LAB
INR BLD: 2.1
PROTIME: 24.9 SECONDS

## 2021-08-18 PROCEDURE — 85610 PROTHROMBIN TIME: CPT

## 2021-08-18 PROCEDURE — 36416 COLLJ CAPILLARY BLOOD SPEC: CPT

## 2021-08-18 PROCEDURE — 99211 OFF/OP EST MAY X REQ PHY/QHP: CPT

## 2021-08-18 PROCEDURE — 9900000065 HC CARDIAC REHAB PHASE 3 - 1 VISIT

## 2021-08-18 NOTE — PROGRESS NOTES
ANTICOAGULATION SERVICE    Date of Clinic Visit:  2021    Dago Moe is a 72 y.o. male who presents to clinic today for anticoagulation monitoring and adjustment. Recent INR Results:  Internal QC passed  Lab Results   Component Value Date    INR 2.1 2021    INR 1.8 2021       Current Warfarin Dosage:  Dosing Plan  As of 2021    TTR:  82.9 % (9.8 mo)   Full warfarin instructions:  5 mg every Mon, Wed, Fri; 7.5 mg all other days               Assessment/Plan:    Continue current regimen as INR remains stable. INR is back in range today but lower in range. Will continue same dose. If INR drops low again we will consider increasing weekly dose. Next Clinic Appointment:  Return date  As of 2021    TTR:  82.9 % (9.8 mo)   Next INR check:  9/15/2021             Please call [facility] at [phone] with any questions. Thanks!   Amira Hauser [facility]  Anticoagulation Service Pharmacist  2021 9:00 AM     For Pharmacy Admin Tracking Only     Intervention Detail: Adherence Monitorin   Total # of Interventions Recommended: 0   Total # of Interventions Accepted: 0   Time Spent (min): 15

## 2021-08-25 ENCOUNTER — HOSPITAL ENCOUNTER (OUTPATIENT)
Dept: CARDIAC REHAB | Age: 66
Discharge: HOME | End: 2021-08-25

## 2021-08-25 PROCEDURE — 9900000065 HC CARDIAC REHAB PHASE 3 - 1 VISIT

## 2021-09-01 ENCOUNTER — HOSPITAL ENCOUNTER (OUTPATIENT)
Dept: CARDIAC REHAB | Age: 66
Setting detail: THERAPIES SERIES
Discharge: HOME | End: 2021-09-01
Payer: MEDICARE

## 2021-09-01 PROCEDURE — 9900000065 HC CARDIAC REHAB PHASE 3 - 1 VISIT

## 2021-09-08 ENCOUNTER — HOSPITAL ENCOUNTER (OUTPATIENT)
Dept: CARDIAC REHAB | Age: 66
Discharge: HOME | End: 2021-09-08

## 2021-09-08 PROCEDURE — 9900000065 HC CARDIAC REHAB PHASE 3 - 1 VISIT

## 2021-09-15 ENCOUNTER — HOSPITAL ENCOUNTER (OUTPATIENT)
Dept: PHARMACY | Age: 66
Setting detail: THERAPIES SERIES
Discharge: HOME | End: 2021-09-15
Payer: MEDICARE

## 2021-09-15 ENCOUNTER — HOSPITAL ENCOUNTER (OUTPATIENT)
Dept: CARDIAC REHAB | Age: 66
Discharge: HOME | End: 2021-09-15

## 2021-09-15 DIAGNOSIS — I51.3 LEFT VENTRICULAR THROMBUS: Primary | ICD-10-CM

## 2021-09-15 LAB
INR BLD: 2.6
PROTIME: 21.7 SECONDS

## 2021-09-15 PROCEDURE — 93798 PHYS/QHP OP CAR RHAB W/ECG: CPT

## 2021-09-15 PROCEDURE — 9900000065 HC CARDIAC REHAB PHASE 3 - 1 VISIT

## 2021-09-15 PROCEDURE — 99211 OFF/OP EST MAY X REQ PHY/QHP: CPT

## 2021-09-15 PROCEDURE — 85610 PROTHROMBIN TIME: CPT

## 2021-09-15 PROCEDURE — 36416 COLLJ CAPILLARY BLOOD SPEC: CPT

## 2021-09-20 RX ORDER — ATORVASTATIN CALCIUM 20 MG/1
20 TABLET, FILM COATED ORAL DAILY
Qty: 90 TABLET | Refills: 3 | Status: SHIPPED | OUTPATIENT
Start: 2021-09-20

## 2021-09-22 ENCOUNTER — HOSPITAL ENCOUNTER (OUTPATIENT)
Dept: CARDIAC REHAB | Age: 66
Discharge: HOME | End: 2021-09-22

## 2021-09-22 PROCEDURE — 9900000065 HC CARDIAC REHAB PHASE 3 - 1 VISIT

## 2021-09-29 ENCOUNTER — HOSPITAL ENCOUNTER (OUTPATIENT)
Dept: CARDIAC REHAB | Age: 66
Discharge: HOME | End: 2021-09-29

## 2021-09-29 PROCEDURE — 9900000065 HC CARDIAC REHAB PHASE 3 - 1 VISIT

## 2021-10-04 ENCOUNTER — HOSPITAL ENCOUNTER (OUTPATIENT)
Dept: NON INVASIVE DIAGNOSTICS | Age: 66
Discharge: HOME | End: 2021-10-04
Payer: MEDICARE

## 2021-10-04 ENCOUNTER — TELEPHONE (OUTPATIENT)
Dept: CARDIOLOGY | Age: 66
End: 2021-10-04

## 2021-10-04 DIAGNOSIS — I42.0 DILATED CARDIOMYOPATHY (HCC): ICD-10-CM

## 2021-10-04 DIAGNOSIS — I42.8 NONISCHEMIC CARDIOMYOPATHY (HCC): ICD-10-CM

## 2021-10-04 DIAGNOSIS — R06.09 DOE (DYSPNEA ON EXERTION): ICD-10-CM

## 2021-10-04 DIAGNOSIS — Z95.810 AICD (AUTOMATIC CARDIOVERTER/DEFIBRILLATOR) PRESENT: ICD-10-CM

## 2021-10-04 DIAGNOSIS — R94.39 POSITIVE CARDIAC STRESS TEST: ICD-10-CM

## 2021-10-04 DIAGNOSIS — E78.5 HYPERLIPIDEMIA, UNSPECIFIED HYPERLIPIDEMIA TYPE: ICD-10-CM

## 2021-10-04 DIAGNOSIS — I50.22 CHRONIC SYSTOLIC CONGESTIVE HEART FAILURE (HCC): ICD-10-CM

## 2021-10-04 LAB
LV EF: 30 %
LVEF MODALITY: NORMAL

## 2021-10-04 PROCEDURE — 93306 TTE W/DOPPLER COMPLETE: CPT

## 2021-10-05 ENCOUNTER — TELEPHONE (OUTPATIENT)
Dept: INTERNAL MEDICINE | Age: 66
End: 2021-10-05

## 2021-10-05 DIAGNOSIS — I50.21 ACUTE SYSTOLIC CHF (CONGESTIVE HEART FAILURE) (HCC): Primary | ICD-10-CM

## 2021-10-05 DIAGNOSIS — I42.8 NONISCHEMIC CARDIOMYOPATHY (HCC): ICD-10-CM

## 2021-10-05 DIAGNOSIS — I51.3 LEFT VENTRICULAR THROMBUS: ICD-10-CM

## 2021-10-05 NOTE — TELEPHONE ENCOUNTER
Pt called to ask if the results of the echo are in. Pt called yesterday and asked it be done soon since he needs to look at his options. Pt has LM with PCP for a referral to [facility] and wants second opinion. Please let pt know if he can have results today or not.     [phone]

## 2021-10-05 NOTE — TELEPHONE ENCOUNTER
Patient has a defibrillator and just had an echo this week. He feels that he would like to get a second opinion and would like a referral to [facility].   Can you please send referral to them

## 2021-10-06 ENCOUNTER — OFFICE VISIT (OUTPATIENT)
Dept: CARDIOLOGY | Age: 66
End: 2021-10-06
Payer: MEDICARE

## 2021-10-06 ENCOUNTER — HOSPITAL ENCOUNTER (OUTPATIENT)
Dept: CARDIAC REHAB | Age: 66
Discharge: HOME | End: 2021-10-06

## 2021-10-06 VITALS
HEART RATE: 56 BPM | WEIGHT: 205 LBS | HEIGHT: 74 IN | BODY MASS INDEX: 26.31 KG/M2 | DIASTOLIC BLOOD PRESSURE: 62 MMHG | SYSTOLIC BLOOD PRESSURE: 108 MMHG

## 2021-10-06 DIAGNOSIS — I10 ESSENTIAL HYPERTENSION: ICD-10-CM

## 2021-10-06 DIAGNOSIS — I42.8 NICM (NONISCHEMIC CARDIOMYOPATHY) (HCC): Primary | ICD-10-CM

## 2021-10-06 DIAGNOSIS — E78.5 HYPERLIPIDEMIA, UNSPECIFIED HYPERLIPIDEMIA TYPE: ICD-10-CM

## 2021-10-06 PROCEDURE — 1036F TOBACCO NON-USER: CPT | Performed by: INTERNAL MEDICINE

## 2021-10-06 PROCEDURE — 4040F PNEUMOC VAC/ADMIN/RCVD: CPT | Performed by: INTERNAL MEDICINE

## 2021-10-06 PROCEDURE — 99214 OFFICE O/P EST MOD 30 MIN: CPT | Performed by: INTERNAL MEDICINE

## 2021-10-06 PROCEDURE — G8417 CALC BMI ABV UP PARAM F/U: HCPCS | Performed by: INTERNAL MEDICINE

## 2021-10-06 PROCEDURE — 3017F COLORECTAL CA SCREEN DOC REV: CPT | Performed by: INTERNAL MEDICINE

## 2021-10-06 PROCEDURE — G8484 FLU IMMUNIZE NO ADMIN: HCPCS | Performed by: INTERNAL MEDICINE

## 2021-10-06 PROCEDURE — 1123F ACP DISCUSS/DSCN MKR DOCD: CPT | Performed by: INTERNAL MEDICINE

## 2021-10-06 PROCEDURE — 9900000065 HC CARDIAC REHAB PHASE 3 - 1 VISIT

## 2021-10-06 PROCEDURE — G8427 DOCREV CUR MEDS BY ELIG CLIN: HCPCS | Performed by: INTERNAL MEDICINE

## 2021-10-06 NOTE — PROGRESS NOTES
Today's Date: 10/6/2021  Patient's Name: Crawford Severin  Patient's age: 72 y. o., 1955    Subjective:  Crawford Severin is being seen in clinic today regarding   Chief Complaint   Patient presents with    Other     Wants to see someone today to discuss echo . See telephone encounter          he was seen by Dr. Chloe Marquez recently and echo was ordered. He reports feeling better after initiation of Farxiga and not taking lasix. He denies any chest pain or dyspnea. He enjoys hunting, fishing. He walks on routine basis. Past Medical History:   has a past medical history of Abnormal cardiovascular stress test, Adenocarcinoma ([address] ENAMORADO (dyspnea on exertion), H/O echocardiogram, and Hyperlipidemia. Past Surgical History:   has a past surgical history that includes Colonoscopy (10/27/2010); Mouth surgery (01/2007); Cardiac catheterization (10/14/2020); and Cardiac defibrillator placement (Left, 04/12/2021). Home Medications:  Prior to Admission medications    Medication Sig Start Date End Date Taking? Authorizing Provider   atorvastatin (LIPITOR) 20 MG tablet TAKE 1 TABLET BY MOUTH DAILY 9/20/21  Yes Bassam Gardner DO   metoprolol succinate (TOPROL XL) 25 MG extended release tablet Take 0.5 tablets by mouth nightly 7/30/21  Yes Bassam Gardner DO   sacubitril-valsartan (ENTRESTO) 24-26 MG per tablet Take 1 tablet by mouth 2 times daily 7/30/21  Yes Bassam Gardner DO   dapagliflozin (FARXIGA) 10 MG tablet Take 1 tablet by mouth every morning 7/30/21  Yes Bassam Gardner DO   warfarin (COUMADIN) 5 MG tablet Take 1 tablet by mouth daily Take 1.5 tablets (7.5 mg) by mouth on Tues, Thurs, Sat and Sun. Take 1 tablet ( 5 mg) by mouth MWF. Or as directed by INR results. 2/17/21 10/6/21 Yes Alonso Moise MD       Allergies:  Penicillins    Social History:   reports that he has never smoked. He has never used smokeless tobacco. He reports that he does not drink alcohol and does not use drugs.      Family History: family history includes Diabetes in his father; Glaucoma in his mother; Heart Attack in his father. No h/o sudden cardiac death. No for premature CAD    REVIEW OF SYSTEMS:    · Constitutional: there has been no unanticipated weight loss. There's been No change in energy level, No change in activity level. · Eyes: No visual changes or diplopia. No scleral icterus. · ENT: No Headaches, hearing loss or vertigo. No mouth sores or sore throat. · Cardiovascular: see above  · Respiratory: see above  · Gastrointestinal: No abdominal pain, appetite loss, blood in stools. · Genitourinary: No dysuria, trouble voiding, or hematuria. · Musculoskeletal:  No gait disturbance, No weakness or joint complaints. · Integumentary: No rash or pruritis. · Neurological: No headache or diplopia. No tingling  · Psychiatric: No anxiety, or depression. · Endocrine: No temperature intolerance. · Hematologic/Lymphatic: No abnormal bruising or bleeding, blood clots or swollen lymph nodes. · Allergic/Immunologic: No nasal congestion or hives. PHYSICAL EXAM:      /62   Pulse 56   Ht 6' 2\" (1.88 m)   Wt 205 lb (93 kg)   BMI 26.32 kg/m²    Constitutional and General Appearance: alert, cooperative, no distress and appears stated age  [de-identified]: PERRL, no cervical lymphadenopathy. No masses palpable. Normal oral mucosa  Respiratory:  · Normal excursion and expansion without use of accessory muscles  · Resp Auscultation: Good respiratory effort. No for increased work of breathing. On auscultation: clear to auscultation bilaterally  Cardiovascular:  · Heart tones are crisp and normal. regular S1 and S2.  · Jugular venous pulsation Normal  · The carotid upstroke is normal in amplitude and contour without delay or bruit   Abdomen:   · soft  · Bowel sounds present  Extremities:  ·  No edema  Neurological:  · Alert and oriented. Labs:     CBC: No results for input(s): WBC, HGB, HCT, PLT in the last 72 hours.   BMP: No results for input(s): NA, K, CO2, BUN, CREATININE, LABGLOM, GLUCOSE in the last 72 hours. PT/INR: No results for input(s): PROTIME, INR in the last 72 hours. FASTING LIPID PANEL:  Lab Results   Component Value Date    HDL 42 09/21/2020    TRIG 145 09/21/2020     LIVER PROFILE:No results for input(s): AST, ALT, LABALBU in the last 72 hours. Problem List:  Patient Active Problem List   Diagnosis    Hyperlipidemia    Lipoma of torso    ENAMORADO (dyspnea on exertion)    Acute systolic CHF (congestive heart failure) (HCC)    Chest pain    Seasonal allergies    Nonischemic cardiomyopathy ([address]    Congestive heart failure ([address]    Apical mural thrombus    Left ventricular thrombus    Coronary artery disease due to lipid rich plaque      Stress 10/20:  1.  Large inferior and inferoapical infarction, minimal faiza-infarct  ischemia. 2.  Severely reduced LV systolic function.     Cath 10/20:   Mild CAD. 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   Severely reduced LV systolic function. LVEDP 28 mm Hg.     Echo 10/20:  Global left ventricular systolic function is severely reduced. Estimated  ejection fraction is 20-25 % . Calculated EF via heart model is 24 %. An echogenic structure measuring approximately 1.75 cm x 2.12 cm is seen in  the apex of the left ventricle and appears like a thrombus formation. Definity was used to optimize images revealing an apical thrombus.     Echo 12/20:  Mildly dilated left ventricle with moderate/severely reduced function. Left ventricular ejection fraction 30 %. Mild left ventricular hypertrophy. No evidence of apical thrombus. Left atrium is moderately dilated. Trivial pericardial effusion.     Echo 3/21:  Limited 2-D echo for ejection fraction. Dilated left ventricle with global hypokinesis. Ejection fraction is estimated at 30-35%. TTE 10/4/21  Summary  Left ventricle is severely dilated.   Left ventricular systolic function is severely reduced, globally. Estimated Left ventricular ejection fraction is 30 % with global hypokinesia. Grade II left ventricular diastolic dysfunction. Left atrium is moderately dilated. Mild mitral regurgitation. Mild tricuspid regurgitation. Estimated right ventricular systolic pressure is 33 mmHg. No significant pericardial effusion is seen. Pacemaker / ICD lead seen in right ventricle    Assessment and plan:    -NICM- Continue BB, Entreto, Farxiga. Has AICD. No objection to physical therapy. -H/o LV thrombus and has been on coumadin. Recommend continue coumadin as LV is akinetic/dyskinetic with future risk of LV clot. -HTN- stable. Continue BB and Entresto  -Hyperlipidemia- continue statin. -RTC 6 months.     Trudy Johnson [facility]  [phone]

## 2021-10-20 ENCOUNTER — HOSPITAL ENCOUNTER (OUTPATIENT)
Dept: PHARMACY | Age: 66
Setting detail: THERAPIES SERIES
Discharge: HOME | End: 2021-10-20
Payer: MEDICARE

## 2021-10-20 ENCOUNTER — HOSPITAL ENCOUNTER (OUTPATIENT)
Dept: CARDIAC REHAB | Age: 66
Discharge: HOME | End: 2021-10-20

## 2021-10-20 DIAGNOSIS — I51.3 LEFT VENTRICULAR THROMBUS: Primary | ICD-10-CM

## 2021-10-20 LAB
INR BLD: 2.5
PROTIME: 29.6 SECONDS

## 2021-10-20 PROCEDURE — 99211 OFF/OP EST MAY X REQ PHY/QHP: CPT

## 2021-10-20 PROCEDURE — 9900000065 HC CARDIAC REHAB PHASE 3 - 1 VISIT

## 2021-10-20 PROCEDURE — 85610 PROTHROMBIN TIME: CPT

## 2021-10-20 PROCEDURE — 36416 COLLJ CAPILLARY BLOOD SPEC: CPT

## 2021-10-20 NOTE — PROGRESS NOTES
ANTICOAGULATION SERVICE    Date of Clinic Visit:  10/20/2021    Joellen Babinski is a 77 y.o. male who presents to clinic today for anticoagulation monitoring and adjustment. Recent INR Results:  Internal QC passed  Lab Results   Component Value Date    INR 2.5 10/20/2021    INR 2.6 09/15/2021       Current Warfarin Dosage:  Dosing Plan  As of 10/20/2021    TTR:  85.9 % (11.9 mo)   Full warfarin instructions:  5 mg every Mon, Wed, Fri; 7.5 mg all other days               Assessment/Plan:    Continue current regimen as INR remains stable. Recheck 4 weeks. Next Clinic Appointment:  Return date  As of 10/20/2021    TTR:  85.9 % (11.9 mo)   Next INR check:  2021             Please call [facility] at [phone] with any questions. Thanks!   Zen Haines, [facility]  Anticoagulation Service Pharmacist  10/20/2021 8:53 AM     For Pharmacy Admin Tracking Only     Intervention Detail: Adherence Monitorin   Total # of Interventions Recommended: 0   Total # of Interventions Accepted: 0   Time Spent (min): 15

## 2021-11-03 ENCOUNTER — HOSPITAL ENCOUNTER (OUTPATIENT)
Dept: CARDIAC REHAB | Age: 66
Discharge: HOME | End: 2021-11-03
Payer: MEDICARE

## 2021-11-03 PROCEDURE — 9900000065 HC CARDIAC REHAB PHASE 3 - 1 VISIT

## 2021-11-10 ENCOUNTER — HOSPITAL ENCOUNTER (OUTPATIENT)
Dept: CARDIAC REHAB | Age: 66
Discharge: HOME | End: 2021-11-10
Payer: MEDICARE

## 2021-11-10 PROCEDURE — 9900000065 HC CARDIAC REHAB PHASE 3 - 1 VISIT

## 2021-11-17 ENCOUNTER — HOSPITAL ENCOUNTER (OUTPATIENT)
Dept: PHARMACY | Age: 66
Setting detail: THERAPIES SERIES
Discharge: HOME | End: 2021-11-17
Payer: MEDICARE

## 2021-11-17 ENCOUNTER — HOSPITAL ENCOUNTER (OUTPATIENT)
Dept: CARDIAC REHAB | Age: 66
Discharge: HOME | End: 2021-11-17
Payer: MEDICARE

## 2021-11-17 DIAGNOSIS — I51.3 LEFT VENTRICULAR THROMBUS: Primary | ICD-10-CM

## 2021-11-17 LAB
INR BLD: 2.1
PROTIME: 25.2 SECONDS

## 2021-11-17 PROCEDURE — 9900000065 HC CARDIAC REHAB PHASE 3 - 1 VISIT

## 2021-11-17 PROCEDURE — 85610 PROTHROMBIN TIME: CPT

## 2021-11-17 PROCEDURE — 99211 OFF/OP EST MAY X REQ PHY/QHP: CPT

## 2021-11-17 PROCEDURE — 36416 COLLJ CAPILLARY BLOOD SPEC: CPT

## 2021-11-17 NOTE — PROGRESS NOTES
ANTICOAGULATION SERVICE    Date of Clinic Visit:  11/17/2021    Jody Bullock is a 77 y.o. male who presents to clinic today for anticoagulation monitoring and adjustment. Recent INR Results:  Internal QC passed  Lab Results   Component Value Date    INR 2.1 11/17/2021    INR 2.5 10/20/2021       Current Warfarin Dosage:  Dosing Plan  As of 11/17/2021    TTR:  86.9 % (1.1 y)   Full warfarin instructions:  5 mg every Mon, Wed, Fri; 7.5 mg all other days               Assessment/Plan:    Continue current regimen as INR remains stable. Next Clinic Appointment:  Return date  As of 11/17/2021    TTR:  86.9 % (1.1 y)   Next INR check:  12/15/2021             Please call [facility] at [phone] with any questions. Thanks!   Mabel Morrell [facility]  Anticoagulation Service Pharmacist  11/17/2021 8:47 AM  For Pharmacy Admin Tracking Only        Total # of Interventions Recommended: 0   Total # of Interventions Accepted: 0   Time Spent (min): 20

## 2021-11-17 NOTE — PATIENT INSTRUCTIONS
\"On day of next appointment, please screen for temperature and COVID-19 symptoms prior to you clinic appointment. If any symptoms present, please call [phone] to reschedule. \"

## 2021-11-19 ENCOUNTER — PROCEDURE VISIT (OUTPATIENT)
Dept: CARDIOLOGY | Age: 66
End: 2021-11-19
Payer: MEDICARE

## 2021-11-19 DIAGNOSIS — Z95.810 AICD (AUTOMATIC CARDIOVERTER/DEFIBRILLATOR) PRESENT: ICD-10-CM

## 2021-11-19 DIAGNOSIS — I42.8 NICM (NONISCHEMIC CARDIOMYOPATHY) (HCC): Primary | ICD-10-CM

## 2021-11-19 DIAGNOSIS — I42.0 DILATED CARDIOMYOPATHY (HCC): ICD-10-CM

## 2021-11-19 PROCEDURE — 93289 INTERROG DEVICE EVAL HEART: CPT | Performed by: INTERNAL MEDICINE

## 2021-11-24 ENCOUNTER — HOSPITAL ENCOUNTER (OUTPATIENT)
Dept: CARDIAC REHAB | Age: 66
Discharge: HOME | End: 2021-11-24
Payer: MEDICARE

## 2021-11-24 PROCEDURE — 9900000065 HC CARDIAC REHAB PHASE 3 - 1 VISIT

## 2021-12-01 ENCOUNTER — HOSPITAL ENCOUNTER (OUTPATIENT)
Dept: CARDIAC REHAB | Age: 66
Discharge: HOME | End: 2021-12-01

## 2021-12-01 PROCEDURE — 9900000065 HC CARDIAC REHAB PHASE 3 - 1 VISIT

## 2021-12-08 ENCOUNTER — HOSPITAL ENCOUNTER (OUTPATIENT)
Dept: CARDIAC REHAB | Age: 66
Discharge: HOME | End: 2021-12-08

## 2021-12-08 PROCEDURE — 9900000065 HC CARDIAC REHAB PHASE 3 - 1 VISIT

## 2021-12-14 ENCOUNTER — HOSPITAL ENCOUNTER (OUTPATIENT)
Dept: PHARMACY | Age: 66
Setting detail: THERAPIES SERIES
Discharge: HOME | End: 2021-12-14
Payer: MEDICARE

## 2021-12-14 DIAGNOSIS — I51.3 LEFT VENTRICULAR THROMBUS: Primary | ICD-10-CM

## 2021-12-14 LAB
INR BLD: 2.7
PROTIME: 32.2 SECONDS

## 2021-12-14 PROCEDURE — 85610 PROTHROMBIN TIME: CPT

## 2021-12-14 PROCEDURE — 36416 COLLJ CAPILLARY BLOOD SPEC: CPT

## 2021-12-14 PROCEDURE — 99211 OFF/OP EST MAY X REQ PHY/QHP: CPT

## 2021-12-14 NOTE — PROGRESS NOTES
ANTICOAGULATION SERVICE    Date of Clinic Visit:  12/14/2021    Rosi Stewart is a 77 y.o. male who presents to clinic today for anticoagulation monitoring and adjustment. Recent INR Results:  Internal QC passed  Lab Results   Component Value Date    INR 2.7 12/14/2021    INR 2.1 11/17/2021       Current Warfarin Dosage:  . Dosing Plan  As of 12/14/2021    TTR:  87.8 % (1.1 y)   Full warfarin instructions:  5 mg every Mon, Wed, Fri; 7.5 mg all other days               Assessment/Plan:    Continue current regimen as INR remains stable. Next Clinic Appointment:  Return date  As of 12/14/2021    TTR:  87.8 % (1.1 y)   Next INR check:  1/19/2022             Please call [facility] at [phone] with any questions. Thanks!   Diana Ibarra, [facility]  Anticoagulation Service Pharmacist  12/14/2021 7:57 AM  For Pharmacy Admin Tracking Only        Total # of Interventions Recommended: 0   Total # of Interventions Accepted: 0   Time Spent (min): 15

## 2021-12-14 NOTE — PATIENT INSTRUCTIONS
\"On day of next appointment, please screen for temperature and COVID-19 symptoms prior to you clinic appointment. If any symptoms present, please call [phone] to reschedule. \"

## 2021-12-14 NOTE — TELEPHONE ENCOUNTER
Pt called for refill of his Farxiga sent to [facility] in [place].      Last Appt:  11/19/2021  Next Appt:   1/31/2022  Med verified in [facility]

## 2021-12-15 ENCOUNTER — HOSPITAL ENCOUNTER (OUTPATIENT)
Dept: CARDIAC REHAB | Age: 66
Discharge: HOME | End: 2021-12-15

## 2021-12-15 PROCEDURE — 9900000065 HC CARDIAC REHAB PHASE 3 - 1 VISIT

## 2021-12-15 RX ORDER — WARFARIN SODIUM 5 MG/1
5 TABLET ORAL DAILY
Qty: 100 TABLET | Refills: 3 | Status: SHIPPED | OUTPATIENT
Start: 2021-12-15 | End: 2022-10-31

## 2022-01-19 ENCOUNTER — HOSPITAL ENCOUNTER (OUTPATIENT)
Dept: PHARMACY | Age: 67
Setting detail: THERAPIES SERIES
Discharge: HOME | End: 2022-01-19
Payer: MEDICARE

## 2022-01-19 ENCOUNTER — HOSPITAL ENCOUNTER (OUTPATIENT)
Dept: CARDIAC REHAB | Age: 67
Discharge: HOME | End: 2022-01-19

## 2022-01-19 LAB
INR BLD: 2.1
PROTIME: 25.7 SECONDS

## 2022-01-19 PROCEDURE — 36416 COLLJ CAPILLARY BLOOD SPEC: CPT

## 2022-01-19 PROCEDURE — 9900000065 HC CARDIAC REHAB PHASE 3 - 1 VISIT

## 2022-01-19 PROCEDURE — 99211 OFF/OP EST MAY X REQ PHY/QHP: CPT

## 2022-01-19 PROCEDURE — 85610 PROTHROMBIN TIME: CPT

## 2022-01-19 NOTE — PROGRESS NOTES
ANTICOAGULATION SERVICE    Date of Clinic Visit:  2022    Irma Laboy is a 77 y.o. male who presents to clinic today for anticoagulation monitoring and adjustment. Recent INR Results:  Internal QC passed  Lab Results   Component Value Date    INR 2.1 2022    INR 2.7 2021       Current Warfarin Dosage:  Dosing Plan  As of 2022    TTR:  88.8 % (1.2 y)   Full warfarin instructions:  5 mg every Mon, Wed, Fri; 7.5 mg all other days               Assessment/Plan:    Continue current regimen as INR remains stable. Recheck 4 weeks. Next Clinic Appointment:  Return date  As of 2022    TTR:  88.8 % (1.2 y)   Next INR check:  2022             Please call [facility] at [phone] with any questions. Thanks!   Bridget Rangel [facility]  Anticoagulation Service Pharmacist  2022 8:13 AM     For Pharmacy Admin Tracking Only     Intervention Detail: Adherence Monitorin   Total # of Interventions Recommended: 0   Total # of Interventions Accepted: 0   Time Spent (min): 15

## 2022-01-26 ENCOUNTER — HOSPITAL ENCOUNTER (OUTPATIENT)
Dept: CARDIAC REHAB | Age: 67
Discharge: HOME | End: 2022-01-26

## 2022-01-26 PROCEDURE — 9900000065 HC CARDIAC REHAB PHASE 3 - 1 VISIT

## 2022-02-23 ENCOUNTER — HOSPITAL ENCOUNTER (OUTPATIENT)
Dept: PHARMACY | Age: 67
Setting detail: THERAPIES SERIES
Discharge: HOME | End: 2022-02-23
Payer: MEDICARE

## 2022-02-23 ENCOUNTER — HOSPITAL ENCOUNTER (OUTPATIENT)
Dept: CARDIAC REHAB | Age: 67
Discharge: HOME | End: 2022-02-23

## 2022-02-23 DIAGNOSIS — I51.3 LEFT VENTRICULAR THROMBUS: Primary | ICD-10-CM

## 2022-02-23 LAB
INR BLD: 2.4
PROTIME: 28.5 SECONDS

## 2022-02-23 PROCEDURE — 99211 OFF/OP EST MAY X REQ PHY/QHP: CPT

## 2022-02-23 PROCEDURE — 9900000065 HC CARDIAC REHAB PHASE 3 - 1 VISIT

## 2022-02-23 PROCEDURE — 85610 PROTHROMBIN TIME: CPT

## 2022-02-23 PROCEDURE — 36416 COLLJ CAPILLARY BLOOD SPEC: CPT

## 2022-02-23 NOTE — PATIENT INSTRUCTIONS
\"On day of next appointment, please screen for temperature and COVID-19 symptoms prior to you clinic appointment. If any symptoms present, please call [phone] to reschedule. \"

## 2022-02-23 NOTE — PROGRESS NOTES
ANTICOAGULATION SERVICE    Date of Clinic Visit:  2/23/2022    Nunu Castrejon is a 77 y.o. male who presents to clinic today for anticoagulation monitoring and adjustment. Recent INR Results:  Internal QC passed  Lab Results   Component Value Date    INR 2.4 02/23/2022    INR 2.1 01/19/2022       Current Warfarin Dosage:  Dosing Plan  As of 2/23/2022    TTR:  89.6 % (1.3 y)   Full warfarin instructions:  5 mg every Mon, Wed, Fri; 7.5 mg all other days               Assessment/Plan:    Continue current regimen as INR remains stable. Next Clinic Appointment:  Return date  As of 2/23/2022    TTR:  89.6 % (1.3 y)   Next INR check:  3/30/2022             Please call [facility] at [phone] with any questions. Thanks!   Glenys Brittle, [facility]  Anticoagulation Service Pharmacist  2/23/2022 8:19 AM  For Pharmacy Admin Tracking Only     Total # of Interventions Recommended: 0   Total # of Interventions Accepted: 0   Time Spent (min): 10

## 2022-03-30 ENCOUNTER — HOSPITAL ENCOUNTER (OUTPATIENT)
Dept: CARDIAC REHAB | Age: 67
Discharge: HOME | End: 2022-03-30

## 2022-03-30 ENCOUNTER — HOSPITAL ENCOUNTER (OUTPATIENT)
Dept: PHARMACY | Age: 67
Setting detail: THERAPIES SERIES
Discharge: HOME | End: 2022-03-30
Payer: MEDICARE

## 2022-03-30 DIAGNOSIS — I51.3 LEFT VENTRICULAR THROMBUS: Primary | ICD-10-CM

## 2022-03-30 LAB
INR BLD: 2.3
PROTIME: 27.2 SECONDS

## 2022-03-30 PROCEDURE — 36416 COLLJ CAPILLARY BLOOD SPEC: CPT

## 2022-03-30 PROCEDURE — 99211 OFF/OP EST MAY X REQ PHY/QHP: CPT

## 2022-03-30 PROCEDURE — 85610 PROTHROMBIN TIME: CPT

## 2022-03-30 PROCEDURE — 9900000065 HC CARDIAC REHAB PHASE 3 - 1 VISIT

## 2022-03-30 NOTE — PROGRESS NOTES
ANTICOAGULATION SERVICE    Date of Clinic Visit:  3/30/2022    Reyna Vazquez is a 77 y.o. male who presents to clinic today for anticoagulation monitoring and adjustment. Recent INR Results:  Internal QC passed  Lab Results   Component Value Date    INR 2.3 2022    INR 2.4 2022       Current Warfarin Dosage:  Dosing Plan  As of 3/30/2022    TTR:  90.3 % (1.4 y)   Full warfarin instructions:  5 mg every Mon, Wed, Fri; 7.5 mg all other days               Assessment/Plan:    Continue current regimen as INR remains stable. Recheck 6 weeks. Next Clinic Appointment:  Return date  As of 3/30/2022    TTR:  90.3 % (1.4 y)   Next INR check:  2022             Please call [facility] at [phone] with any questions. Thanks!   Cherise Almendarez [facility]  Anticoagulation Service Pharmacist  3/30/2022 8:10 AM     For Pharmacy Admin Tracking Only     Intervention Detail: Adherence Monitorin   Total # of Interventions Recommended: 0   Total # of Interventions Accepted: 0   Time Spent (min): 15

## 2022-04-13 ENCOUNTER — HOSPITAL ENCOUNTER (OUTPATIENT)
Dept: CARDIAC REHAB | Age: 67
Discharge: HOME | End: 2022-04-13

## 2022-04-13 PROCEDURE — 9900000065 HC CARDIAC REHAB PHASE 3 - 1 VISIT

## 2022-04-18 ENCOUNTER — OFFICE VISIT (OUTPATIENT)
Dept: CARDIOLOGY | Age: 67
End: 2022-04-18
Payer: MEDICARE

## 2022-04-18 ENCOUNTER — TELEPHONE (OUTPATIENT)
Dept: SURGERY | Age: 67
End: 2022-04-18

## 2022-04-18 ENCOUNTER — OFFICE VISIT (OUTPATIENT)
Dept: INTERNAL MEDICINE | Age: 67
End: 2022-04-18
Payer: MEDICARE

## 2022-04-18 ENCOUNTER — HOSPITAL ENCOUNTER (OUTPATIENT)
Dept: LAB | Age: 67
Discharge: HOME | End: 2022-04-18
Payer: MEDICARE

## 2022-04-18 VITALS
BODY MASS INDEX: 26.31 KG/M2 | WEIGHT: 205 LBS | DIASTOLIC BLOOD PRESSURE: 64 MMHG | SYSTOLIC BLOOD PRESSURE: 110 MMHG | HEART RATE: 59 BPM | HEIGHT: 74 IN | RESPIRATION RATE: 16 BRPM

## 2022-04-18 VITALS
SYSTOLIC BLOOD PRESSURE: 110 MMHG | DIASTOLIC BLOOD PRESSURE: 64 MMHG | HEART RATE: 58 BPM | BODY MASS INDEX: 26.31 KG/M2 | WEIGHT: 205 LBS | HEIGHT: 74 IN

## 2022-04-18 DIAGNOSIS — E78.5 HYPERLIPIDEMIA, UNSPECIFIED HYPERLIPIDEMIA TYPE: ICD-10-CM

## 2022-04-18 DIAGNOSIS — I50.22 CHRONIC SYSTOLIC CONGESTIVE HEART FAILURE (HCC): Primary | ICD-10-CM

## 2022-04-18 DIAGNOSIS — Z12.5 SPECIAL SCREENING FOR MALIGNANT NEOPLASM OF PROSTATE: ICD-10-CM

## 2022-04-18 DIAGNOSIS — I25.83 CORONARY ARTERY DISEASE DUE TO LIPID RICH PLAQUE: ICD-10-CM

## 2022-04-18 DIAGNOSIS — I25.10 CORONARY ARTERY DISEASE DUE TO LIPID RICH PLAQUE: ICD-10-CM

## 2022-04-18 DIAGNOSIS — Z12.11 ENCOUNTER FOR SCREENING COLONOSCOPY: ICD-10-CM

## 2022-04-18 DIAGNOSIS — I50.22 CHRONIC SYSTOLIC CONGESTIVE HEART FAILURE (HCC): ICD-10-CM

## 2022-04-18 DIAGNOSIS — I42.8 NICM (NONISCHEMIC CARDIOMYOPATHY) (HCC): Primary | ICD-10-CM

## 2022-04-18 PROBLEM — I50.21 ACUTE SYSTOLIC CHF (CONGESTIVE HEART FAILURE) (HCC): Status: RESOLVED | Noted: 2020-10-13 | Resolved: 2022-04-18

## 2022-04-18 LAB
ALBUMIN SERPL-MCNC: 4.3 G/DL (ref 3.5–5.2)
ALBUMIN/GLOBULIN RATIO: 1.4 (ref 1–2.5)
ALP BLD-CCNC: 78 U/L (ref 40–129)
ALT SERPL-CCNC: 23 U/L (ref 5–41)
ANION GAP SERPL CALCULATED.3IONS-SCNC: 7 MMOL/L (ref 9–17)
AST SERPL-CCNC: 21 U/L
BILIRUB SERPL-MCNC: 0.56 MG/DL (ref 0.3–1.2)
BUN BLDV-MCNC: 24 MG/DL (ref 8–23)
BUN/CREAT BLD: 20 (ref 9–20)
CALCIUM SERPL-MCNC: 9.6 MG/DL (ref 8.6–10.4)
CHLORIDE BLD-SCNC: 103 MMOL/L (ref 98–107)
CHOLESTEROL/HDL RATIO: 3.2
CHOLESTEROL: 141 MG/DL
CO2: 29 MMOL/L (ref 20–31)
CREAT SERPL-MCNC: 1.18 MG/DL (ref 0.7–1.2)
GFR AFRICAN AMERICAN: >60 ML/MIN
GFR NON-AFRICAN AMERICAN: >60 ML/MIN
GFR SERPL CREATININE-BSD FRML MDRD: ABNORMAL ML/MIN/{1.73_M2}
GLUCOSE FASTING: 97 MG/DL (ref 70–99)
HDLC SERPL-MCNC: 44 MG/DL
LDL CHOLESTEROL: 67 MG/DL (ref 0–130)
POTASSIUM SERPL-SCNC: 4.6 MMOL/L (ref 3.7–5.3)
PROSTATE SPECIFIC ANTIGEN: 3.95 UG/L
SODIUM BLD-SCNC: 139 MMOL/L (ref 135–144)
TOTAL PROTEIN: 7.3 G/DL (ref 6.4–8.3)
TRIGL SERPL-MCNC: 152 MG/DL

## 2022-04-18 PROCEDURE — 1123F ACP DISCUSS/DSCN MKR DOCD: CPT | Performed by: INTERNAL MEDICINE

## 2022-04-18 PROCEDURE — 99214 OFFICE O/P EST MOD 30 MIN: CPT | Performed by: INTERNAL MEDICINE

## 2022-04-18 PROCEDURE — G0103 PSA SCREENING: HCPCS

## 2022-04-18 PROCEDURE — 1036F TOBACCO NON-USER: CPT | Performed by: INTERNAL MEDICINE

## 2022-04-18 PROCEDURE — 80053 COMPREHEN METABOLIC PANEL: CPT

## 2022-04-18 PROCEDURE — 3017F COLORECTAL CA SCREEN DOC REV: CPT | Performed by: INTERNAL MEDICINE

## 2022-04-18 PROCEDURE — G8427 DOCREV CUR MEDS BY ELIG CLIN: HCPCS | Performed by: INTERNAL MEDICINE

## 2022-04-18 PROCEDURE — 36415 COLL VENOUS BLD VENIPUNCTURE: CPT

## 2022-04-18 PROCEDURE — 4040F PNEUMOC VAC/ADMIN/RCVD: CPT | Performed by: INTERNAL MEDICINE

## 2022-04-18 PROCEDURE — G8417 CALC BMI ABV UP PARAM F/U: HCPCS | Performed by: INTERNAL MEDICINE

## 2022-04-18 PROCEDURE — 93005 ELECTROCARDIOGRAM TRACING: CPT | Performed by: INTERNAL MEDICINE

## 2022-04-18 PROCEDURE — 80061 LIPID PANEL: CPT

## 2022-04-18 PROCEDURE — 93010 ELECTROCARDIOGRAM REPORT: CPT | Performed by: INTERNAL MEDICINE

## 2022-04-18 ASSESSMENT — ENCOUNTER SYMPTOMS
SHORTNESS OF BREATH: 0
BACK PAIN: 0
CONSTIPATION: 0
NAUSEA: 0
EYE PAIN: 0
VOMITING: 0
DIARRHEA: 0
ABDOMINAL PAIN: 0
BLOOD IN STOOL: 0
COUGH: 0

## 2022-04-18 NOTE — TELEPHONE ENCOUNTER
Writer received a call from Stiven Neal in Internal Medicine. Patient is due for a screening colonoscopy, no symptoms. Requested Dr. Lenora Reina, colonoscopy paperwork mailed.

## 2022-04-18 NOTE — PROGRESS NOTES
[place] [address]  Dept: [phone]  Dept Fax: [phone]  Loc: [phone]     Kina Aguiar is a 77 y.o. male who presents today for his medical conditions/complaintsas noted below. Kina Aguiar is c/o of   Chief Complaint   Patient presents with    Congestive Heart Failure     6 month    Hyperlipidemia    Coronary Artery Disease         HPI:     Congestive Heart Failure  Presents for follow-up (Systolic CHF) visit. Pertinent negatives include no abdominal pain, chest pain, chest pressure, palpitations or shortness of breath. The symptoms have been stable. His past medical history is significant for CAD. Compliance with total regimen is %. Compliance with diet is %. Compliance with exercise is %. Compliance with medications is %. Hyperlipidemia  This is a chronic problem. The current episode started more than 1 year ago. The problem is controlled. Recent lipid tests were reviewed and are variable. Pertinent negatives include no chest pain or shortness of breath. Coronary Artery Disease  Presents for follow-up visit. Pertinent negatives include no chest pain, chest pressure, dizziness, leg swelling, palpitations or shortness of breath. Risk factors include hyperlipidemia. His past medical history is significant for CHF. The symptoms have been stable. Compliance with diet is good. Compliance with exercise is good. Compliance with medications is good.        No results found for: LABA1C         No results found for: LABMICR  LDL Cholesterol (mg/dL)   Date Value   09/21/2020 120   10/28/2019 117   10/24/2018 111         AST (U/L)   Date Value   04/18/2022 21     ALT (U/L)   Date Value   04/18/2022 23     BUN (mg/dL)   Date Value   04/18/2022 24 (H)     BP Readings from Last 3 Encounters:   04/18/22 110/64   04/18/22 110/64   10/06/21 108/62 Past Medical History:   Diagnosis Date    Abnormal cardiovascular stress test 10/2020     Large inferior and inferoapical infarction, minimal faiza-infarct ischemia / Severely reduced LV systolic function.  Adenocarcinoma ([address]     Lip.  ENAMORADO (dyspnea on exertion)     H/O echocardiogram 10/08/2020    ECHO 10/8/2020: EF 20%, grade I DD, mild TR, RVSP is 50 mmHg, moderate PHTN.  Hyperlipidemia       Past Surgical History:   Procedure Laterality Date    CARDIAC CATHETERIZATION  10/14/2020    CARDIAC DEFIBRILLATOR PLACEMENT Left 04/12/2021    COLONOSCOPY  10/27/2010    Showed multiple internal hemorrhoids.  MOUTH SURGERY  01/2007    Lip surgery. Family History   Problem Relation Age of Onset    Heart Attack Father         Myocardial infarction in his 76s.  Diabetes Father     Glaucoma Mother           Social History     Tobacco Use    Smoking status: Never Smoker    Smokeless tobacco: Never Used   Substance Use Topics    Alcohol use: No         Current Outpatient Medications   Medication Sig Dispense Refill    warfarin (COUMADIN) 5 MG tablet Take 1 tablet by mouth daily 5 mg every mon, wed, fri; 7.5 mg all other days; Or as directed by INR results. 100 tablet 3    dapagliflozin (FARXIGA) 10 MG tablet Take 1 tablet by mouth every morning 90 tablet 2    atorvastatin (LIPITOR) 20 MG tablet TAKE 1 TABLET BY MOUTH DAILY 90 tablet 3    metoprolol succinate (TOPROL XL) 25 MG extended release tablet Take 0.5 tablets by mouth nightly 45 tablet 3    sacubitril-valsartan (ENTRESTO) 24-26 MG per tablet Take 1 tablet by mouth 2 times daily 180 tablet 3     No current facility-administered medications for this visit.      Allergies   Allergen Reactions    Penicillins Other (See Comments)     As a child- unsure of reaction       Health Maintenance   Topic Date Due    Hepatitis C screen  Never done    Pneumococcal 65+ years Vaccine (1 - PCV) Never done    Shingles Vaccine (1 of 2) Palpations: Abdomen is soft. There is no mass. Tenderness: There is no abdominal tenderness. There is no rebound. Musculoskeletal:         General: Normal range of motion. Cervical back: Neck supple. Lymphadenopathy:      Cervical: No cervical adenopathy. Skin:     General: Skin is warm and dry. Findings: No rash. Neurological:      Mental Status: He is alert and oriented to person, place, and time. Cranial Nerves: No cranial nerve deficit (grossly). Psychiatric:         Thought Content: Thought content normal.        /64 (Site: Left Upper Arm, Position: Sitting, Cuff Size: Large Adult)   Pulse 59   Resp 16   Ht 6' 2\" (1.88 m)   Wt 205 lb (93 kg)   BMI 26.32 kg/m²     Assessment:       Diagnosis Orders   1. Chronic systolic congestive heart failure ([address]     2. Hyperlipidemia, unspecified hyperlipidemia type     3. Coronary artery disease due to lipid rich plaque     4. Encounter for screening colonoscopy  Mary Lees MD, General Surgery, [place]      Reviewed multiple test results for this appointment. 3/30/2022 okay INR at 2.3.    4/18/2022 normal glucose at 97.    11/8/2021 normal A1c at 5.6. Patient told to continue Coumadin. Patient's PSA and fasting lipid  have been drawn today but are still pending. Plan:       Return in about 6 months (around 10/18/2022) for medicare AWV, Hyperlipidemia, CAD, CHF. Orders Placed This Encounter   Procedures   Carolyn Lowe MD, General Surgery, [place]     Referral Priority:   Routine     Referral Type:   Eval and Treat     Referral Reason:   Specialty Services Required     Referred to Provider:   Brittany Washburn MD     Requested Specialty:   General Surgery     Number of Visits Requested:   1     No orders of the defined types were placed in this encounter. Patientgiven educational materials - see patient instructions. Discussed use, benefit,and side effects of prescribed medications.   All patient questions answered. Ptvoiced understanding. Reviewed health maintenance. Instructed to continue currentmedications, diet and exercise. Patient agreed with treatment plan. Follow up asdirected.      Electronically signed by Davis Ferrer MD on 4/18/2022 at 11:52 AM

## 2022-04-18 NOTE — PROGRESS NOTES
[place] [address]  [address] [facility] [address]  Dept: [phone]    Subjective: The patient is a 77y.o. year old, , male is in the office for a follow up visit. History of nonischemic cardiomyopathy's CHF HFrEF AICD in situ. He lyle any chest pain or discomfort. Patient had been doing everything he wants to without any symptom of lightheaded dizzy short of breath patient denies any weight gain rather has lost weight. No orthopnea or PND. Lyle any palpitation, dizziness or syncope. Denies any shocks from AICD. He is completely asymptomatic from cardiac stand point. Past Medical History:   has a past medical history of Abnormal cardiovascular stress test, Adenocarcinoma ([address] ENAMORADO (dyspnea on exertion), H/O echocardiogram, and Hyperlipidemia. Past Surgical History:   has a past surgical history that includes Colonoscopy (10/27/2010); Mouth surgery (01/2007); Cardiac catheterization (10/14/2020); and Cardiac defibrillator placement (Left, 04/12/2021). Home Medications:  Prior to Admission medications    Medication Sig Start Date End Date Taking? Authorizing Provider   warfarin (COUMADIN) 5 MG tablet Take 1 tablet by mouth daily 5 mg every mon, wed, fri; 7.5 mg all other days; Or as directed by INR results.  12/15/21 6/13/22 Yes ANGY Coburn - CNP   dapagliflozin (FARXIGA) 10 MG tablet Take 1 tablet by mouth every morning 12/14/21  Yes Darylene Console, MD   atorvastatin (LIPITOR) 20 MG tablet TAKE 1 TABLET BY MOUTH DAILY 9/20/21  Yes Bassam Gardner DO   metoprolol succinate (TOPROL XL) 25 MG extended release tablet Take 0.5 tablets by mouth nightly 7/30/21  Yes Bassam Gardner DO   sacubitril-valsartan (ENTRESTO) 24-26 MG per tablet Take 1 tablet by mouth 2 times daily 7/30/21  Yes Bassam Gardner DO       Allergies:  Penicillins    Social History:   reports that he has never smoked. He has never used smokeless tobacco. He reports that he does not drink alcohol and does not use drugs. Review of Systems:  · Constitutional: there has been no unanticipated weight loss. There's been No change in energy level, No change in activity level. · Eyes: No visual changes or diplopia. No scleral icterus. · ENT: No Headaches, hearing loss or vertigo. No mouth sores or sore throat. · Cardiovascular: As above. · Respiratory: No SOB, cough or hemoptysis. · Gastrointestinal: No abdominal pain, appetite loss, blood in stools. No change in bowel or bladder habits. · Genitourinary: No dysuria, trouble voiding, or hematuria. · Musculoskeletal:  No gait disturbance, No weakness or joint complaints. · Integumentary: No rash or pruritis. · Psychiatric: No anxiety, or depression. · Hematologic/Lymphatic: No abnormal bruising or bleeding, blood clots or swollen lymph nodes. · Allergic/Immunologic: No nasal congestion or hives. Physical Exam:  /64   Pulse 58   Ht 6' 2\" (1.88 m)   Wt 205 lb (93 kg)   BMI 26.32 kg/m²     Constitutional and General Appearance: alert, cooperative, no distress and appears stated age  [de-identified]: PERRL, no cervical lymphadenopathy. No masses palpable. Normal oral mucosa  Respiratory:  · Normal excursion and expansion without use of accessory muscles  · Resp Auscultation: Good respiratory effort. No for increased work of breathing.  On auscultation: clear to auscultation bilaterally  Cardiovascular:  · The apical impulse is not displaced  · Heart tones are crisp and normal. regular S1 and S2.  · Jugular venous pulsation Normal  · The carotid upstroke is normal in amplitude and contour without delay or bruit  · Peripheral pulses are symmetrical and full   Abdomen:   · No masses or tenderness  · Bowel sounds present  Extremities:  ·  No Cyanosis or Clubbing  ·  Lower extremity edema: No  ·  Skin: Warm and dry    Cardiac Data:  EKG: nsr     TTE 10/4/21  Summary  Left ventricle is severely dilated. Left ventricular systolic function is severely reduced, globally. Estimated Left ventricular ejection fraction is 30 % with global hypokinesia. Grade II left ventricular diastolic dysfunction. Left atrium is moderately dilated. Mild mitral regurgitation. Mild tricuspid regurgitation. Estimated right ventricular systolic pressure is 33 mmHg. No significant pericardial effusion is seen. Pacemaker / ICD lead seen in right ventricle       Labs:     CBC: No results for input(s): WBC, HGB, HCT, PLT in the last 72 hours. BMP:   Recent Labs     04/18/22  0733      K 4.6   CO2 29   BUN 24*   CREATININE 1.18   LABGLOM >60     PT/INR: No results for input(s): PROTIME, INR in the last 72 hours.   FASTING LIPID PANEL:  Lab Results   Component Value Date    CHOL 191 09/21/2020    HDL 42 09/21/2020    LDLCHOLESTEROL 120 09/21/2020    TRIG 145 09/21/2020    CHOLHDLRATIO 4.5 09/21/2020     LIVER PROFILE:  Recent Labs     04/18/22  0733   AST 21   ALT 23   LABALBU 4.3         IMPRESSION:    Patient Active Problem List   Diagnosis    Hyperlipidemia    Lipoma of torso    ENAMORADO (dyspnea on exertion)    Acute systolic CHF (congestive heart failure) (HCC)    Chest pain    Seasonal allergies    Nonischemic cardiomyopathy [address] [address]    Congestive heart failure (HCC)    Apical mural thrombus    Left ventricular thrombus    Coronary artery disease due to lipid rich plaque       RECOMMENDATIONS:  Nonischemic cardiomyopathy, CHF, HFrEF, AICD in situ very well compensated no signs of any volume overload we will continue beta-blocker and Entresto Farxiga    History of clot in the LV on Coumadin keep the INR between 2-3 watch for bleeding     HYPERTENSION- WELL CONTROLLED, WILL CONTINUE CURRENT MEDICATIONS     HYPERLIPIDEMIA- ON STATIN, NEEDS TO WATCH LIPID PROFILE AND LFT'S      DISCUSSED IN DETAILS ABOUT RISK MODIFICATION    RETURN VISIT IN 6 MONTHS, IF ANY SYMPTOM CHANGE PATIENT ADVISED TO GO TO THE EMERGENCY ROOM.           Haley Holbrook MD, MD  [facility]           [phone]

## 2022-04-20 ENCOUNTER — HOSPITAL ENCOUNTER (OUTPATIENT)
Dept: CARDIAC REHAB | Age: 67
Discharge: HOME | End: 2022-04-20

## 2022-04-20 PROCEDURE — 9900000065 HC CARDIAC REHAB PHASE 3 - 1 VISIT

## 2022-04-27 ENCOUNTER — HOSPITAL ENCOUNTER (OUTPATIENT)
Dept: CARDIAC REHAB | Age: 67
Discharge: HOME | End: 2022-04-27

## 2022-04-27 ENCOUNTER — TELEPHONE (OUTPATIENT)
Dept: SURGERY | Age: 67
End: 2022-04-27

## 2022-04-27 PROCEDURE — 9900000065 HC CARDIAC REHAB PHASE 3 - 1 VISIT

## 2022-04-27 NOTE — TELEPHONE ENCOUNTER
[address]         Patient:Javier Bean           :1955           Surgical/Procedure Planned: Colonoscopy     Date & Location: 2022 at [facility]       Outpatient   Planned Length of OR: 30 min    Sedation: intravenous sedation      Estimated Cardiac Risk for Non-Cardiac Surgery/Procedure     Low______ Moderate__X____ High______    Medication Instructions - Clarification needed by this date:     Coumadin Hold __5_ Days      Resume medications:     Lovenox indicated: _X____Yes _____NO    Provider:Dr. Malka Woods of Provider Giving Orders for Medication holds:    _____________________________________________  Guille Alaniz DO, [facility], [address], [address] [facility]  [facility]. [facility]  [phone]

## 2022-04-27 NOTE — TELEPHONE ENCOUNTER
H &P Colonoscopy  Patient:Javier Bean                 :1955  (no) patient has seen Dr. Ladan Henry prior to procedure  PCP: Dr. Brandon Murdock    CC: screening        HPI:        Colonoscopy  Abd pain: no  Anemia: no  Bloating:no  Diarrhea: no  Constipation: no  Melena: no  Hematochezia:no  Rectal Bleeding:no  Rectal/Anal Pain:no  Pruritus: no  Family history colon Cancer: no  Previous colon cancer: no  Previous Colon Polyp: no  Change in bowels: no  Decrease caliber of stool: no  Change in color of stool: no    Previous work up date: Colonoscopy on 10/27/2010 by Dr. Mary Ann Jacome at [facility]= multiple internal hemorrhoids, otherwise normal       Family History   Problem Relation Age of Onset    Heart Attack Father         Myocardial infarction in his 76s.  Diabetes Father     Glaucoma Mother      Social History     Socioeconomic History    Marital status:      Spouse name: Not on file    Number of children: Not on file    Years of education: Not on file    Highest education level: Not on file   Occupational History    Not on file   Tobacco Use    Smoking status: Never Smoker    Smokeless tobacco: Never Used   Substance and Sexual Activity    Alcohol use: No    Drug use: No    Sexual activity: Not on file   Other Topics Concern    Not on file   Social History Narrative    Not on file     Social Determinants of Health     Financial Resource Strain: Low Risk     Difficulty of Paying Living Expenses: Not hard at all   Food Insecurity: No Food Insecurity    Worried About [address] in the Last Year: Never true    [address]  in the Last Year: Never true   Transportation Needs:     Lack of Transportation (Medical): Not on file    Lack of Transportation (Non-Medical):  Not on file   Physical Activity:     Days of Exercise per Week: Not on file    Minutes of Exercise per Session: Not on file   Stress:     Feeling of Stress : Not on file   Social Connections:     Frequency of Communication with Friends and Family: Not on file    Frequency of Social Gatherings with Friends and Family: Not on file    Attends Confucianism Services: Not on file    Active Member of Clubs or Organizations: Not on file    Attends Club or Organization Meetings: Not on file    Marital Status: Not on file   Intimate Partner Violence:     Fear of Current or Ex-Partner: Not on file    Emotionally Abused: Not on file    Physically Abused: Not on file    Sexually Abused: Not on file   Housing Stability:     Unable to Pay for Housing in the Last Year: Not on file    Number of [place] in the Last Year: Not on file    Unstable Housing in the Last Year: Not on file     Past Surgical History:   Procedure Laterality Date    CARDIAC CATHETERIZATION  10/14/2020   [address] Left 04/12/2021    COLONOSCOPY  10/27/2010    Showed multiple internal hemorrhoids.  MOUTH SURGERY  01/2007    Lip surgery. Past Medical History:   Diagnosis Date    Abnormal cardiovascular stress test 10/2020     Large inferior and inferoapical infarction, minimal faiza-infarct ischemia / Severely reduced LV systolic function.  Adenocarcinoma ([address]     Lip.  ENAMORADO (dyspnea on exertion)     H/O echocardiogram 10/08/2020    ECHO 10/8/2020: EF 20%, grade I DD, mild TR, RVSP is 50 mmHg, moderate PHTN.  Hyperlipidemia      Current Outpatient Medications on File Prior to Visit   Medication Sig Dispense Refill    warfarin (COUMADIN) 5 MG tablet Take 1 tablet by mouth daily 5 mg every mon, wed, fri; 7.5 mg all other days; Or as directed by INR results.  100 tablet 3    dapagliflozin (FARXIGA) 10 MG tablet Take 1 tablet by mouth every morning 90 tablet 2    atorvastatin (LIPITOR) 20 MG tablet TAKE 1 TABLET BY MOUTH DAILY 90 tablet 3    metoprolol succinate (TOPROL XL) 25 MG extended release tablet Take 0.5 tablets by mouth nightly 45 tablet 3    sacubitril-valsartan (ENTRESTO) 24-26 MG per tablet Take 1 tablet by mouth 2 times daily 180 tablet 3     No current facility-administered medications on file prior to visit.      Allergies as of 04/18/2022 - Fully Reviewed 04/18/2022   Allergen Reaction Noted    Penicillins Other (See Comments) 10/30/2013           PEx:                ASSESSMENT:        PLAN: Colonoscopy

## 2022-04-27 NOTE — TELEPHONE ENCOUNTER
Instructions reviewed and mailed to the patient. All questions answered and patient denies any further questions at this time. Patient scheduled for CS on 7/19/2022 at [facility] with Dr. Lele Haskins. Instructed patient he can purchase the Miralax/Gatorade bowel prep over the counter at his pharmacy.

## 2022-05-02 NOTE — TELEPHONE ENCOUNTER
Spoke to david in the coumadin clinic, he states he will put a note in his chart as he will see patient multiple times before his procedure and will order it when the patient is seen. Will mail instructions for when patient to start holding his coumadin in his preop and bowel prep instructions.

## 2022-05-04 ENCOUNTER — HOSPITAL ENCOUNTER (OUTPATIENT)
Dept: CARDIAC REHAB | Age: 67
Discharge: HOME | End: 2022-05-04

## 2022-05-04 PROCEDURE — 9900000065 HC CARDIAC REHAB PHASE 3 - 1 VISIT

## 2022-05-06 ENCOUNTER — APPOINTMENT (OUTPATIENT)
Dept: CT IMAGING | Age: 67
End: 2022-05-06
Payer: OTHER MISCELLANEOUS

## 2022-05-06 ENCOUNTER — HOSPITAL ENCOUNTER (EMERGENCY)
Age: 67
Discharge: HOME | End: 2022-05-06
Attending: EMERGENCY MEDICINE
Payer: OTHER MISCELLANEOUS

## 2022-05-06 VITALS
WEIGHT: 195 LBS | HEIGHT: 74 IN | OXYGEN SATURATION: 98 % | HEART RATE: 64 BPM | BODY MASS INDEX: 25.03 KG/M2 | TEMPERATURE: 97.5 F

## 2022-05-06 DIAGNOSIS — S16.1XXA ACUTE STRAIN OF NECK MUSCLE, INITIAL ENCOUNTER: ICD-10-CM
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
## 2022-05-06 PROCEDURE — 70450 CT HEAD/BRAIN W/O DYE: CPT

## 2022-05-06 PROCEDURE — 99284 EMERGENCY DEPT VISIT MOD MDM: CPT

## 2022-05-06 PROCEDURE — 72125 CT NECK SPINE W/O DYE: CPT

## 2022-05-06 ASSESSMENT — PAIN DESCRIPTION - LOCATION: LOCATION: NECK

## 2022-05-06 ASSESSMENT — PAIN - FUNCTIONAL ASSESSMENT: PAIN_FUNCTIONAL_ASSESSMENT: 0-10

## 2022-05-06 ASSESSMENT — PAIN SCALES - GENERAL: PAINLEVEL_OUTOF10: 1
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
## 2022-05-06 NOTE — ED NOTES
AVS rev'd with pt. And copy given. Pulse regular. Extremities warm. Respirations regular and quiet. Mucous membranes pink & moist. Alert and oriented times 3. No nausea or vomiting. Range of motion within patient's limits. Skin pink, warm and dry. Calm and cooperative. Dr. Dimas Priest into discuss poc.      Tanya Knox RN  05/06/22 6530
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
## 2022-05-07 ASSESSMENT — ENCOUNTER SYMPTOMS
EYES NEGATIVE: 1
GASTROINTESTINAL NEGATIVE: 1
RESPIRATORY NEGATIVE: 1
BACK PAIN: 0

## 2022-05-11 ENCOUNTER — HOSPITAL ENCOUNTER (OUTPATIENT)
Dept: PHARMACY | Age: 67
Setting detail: THERAPIES SERIES
Discharge: HOME | End: 2022-05-11
Payer: MEDICARE

## 2022-05-11 ENCOUNTER — HOSPITAL ENCOUNTER (OUTPATIENT)
Dept: CARDIAC REHAB | Age: 67
Discharge: HOME | End: 2022-05-11

## 2022-05-11 DIAGNOSIS — I51.3 LEFT VENTRICULAR THROMBUS: Primary | ICD-10-CM

## 2022-05-11 LAB
INR BLD: 2.3
PROTIME: 27.8 SECONDS

## 2022-05-11 PROCEDURE — 9900000065 HC CARDIAC REHAB PHASE 3 - 1 VISIT

## 2022-05-11 PROCEDURE — 85610 PROTHROMBIN TIME: CPT

## 2022-05-11 PROCEDURE — 36416 COLLJ CAPILLARY BLOOD SPEC: CPT

## 2022-05-11 PROCEDURE — 99211 OFF/OP EST MAY X REQ PHY/QHP: CPT

## 2022-05-11 NOTE — PROGRESS NOTES
ANTICOAGULATION SERVICE    Date of Clinic Visit:  2022    Phyllis Huertas is a 77 y.o. male who presents to clinic today for anticoagulation monitoring and adjustment. Recent INR Results:  Internal QC passed  Lab Results   Component Value Date    INR 2.3 2022    INR 2.3 2022       Current Warfarin Dosage:  Dosing Plan  As of 2022    TTR:  91.0 % (1.5 y)   Full warfarin instructions:  5 mg every Mon, Wed, Fri; 7.5 mg all other days               Assessment/Plan:    Continue current regimen as INR remains stable. Recheck 6 weeks. Next Clinic Appointment:  Return date  As of 2022    TTR:  91.0 % (1.5 y)   Next INR check:  2022             Please call [facility] at [phone] with any questions. Thanks!   Donna Manzano [facility]  Anticoagulation Service Pharmacist  2022 8:12 AM     For Pharmacy Admin Tracking Only     Intervention Detail: Adherence Monitorin   Total # of Interventions Recommended: 0   Total # of Interventions Accepted: 0   Time Spent (min): 15
no

## 2022-05-18 ENCOUNTER — HOSPITAL ENCOUNTER (OUTPATIENT)
Dept: CARDIAC REHAB | Age: 67
Discharge: HOME | End: 2022-05-18

## 2022-05-18 PROCEDURE — 9900000065 HC CARDIAC REHAB PHASE 3 - 1 VISIT

## 2022-05-25 ENCOUNTER — HOSPITAL ENCOUNTER (OUTPATIENT)
Dept: CARDIAC REHAB | Age: 67
Discharge: HOME | End: 2022-05-25

## 2022-05-27 ENCOUNTER — PROCEDURE VISIT (OUTPATIENT)
Dept: CARDIOLOGY | Age: 67
End: 2022-05-27
Payer: MEDICARE

## 2022-05-27 DIAGNOSIS — I42.8 NONISCHEMIC CARDIOMYOPATHY (HCC): ICD-10-CM

## 2022-05-27 DIAGNOSIS — Z95.810 AICD (AUTOMATIC CARDIOVERTER/DEFIBRILLATOR) PRESENT: ICD-10-CM

## 2022-05-27 PROCEDURE — 93289 INTERROG DEVICE EVAL HEART: CPT | Performed by: INTERNAL MEDICINE

## 2022-06-01 ENCOUNTER — HOSPITAL ENCOUNTER (OUTPATIENT)
Dept: CARDIAC REHAB | Age: 67
Discharge: HOME | End: 2022-06-01

## 2022-06-08 ENCOUNTER — HOSPITAL ENCOUNTER (OUTPATIENT)
Dept: CARDIAC REHAB | Age: 67
Discharge: HOME | End: 2022-06-08

## 2022-06-08 PROCEDURE — 9900000065 HC CARDIAC REHAB PHASE 3 - 1 VISIT

## 2022-06-15 ENCOUNTER — HOSPITAL ENCOUNTER (OUTPATIENT)
Dept: CARDIAC REHAB | Age: 67
Discharge: HOME | End: 2022-06-15

## 2022-06-15 PROCEDURE — 9900000065 HC CARDIAC REHAB PHASE 3 - 1 VISIT

## 2022-06-22 ENCOUNTER — HOSPITAL ENCOUNTER (OUTPATIENT)
Dept: PHARMACY | Age: 67
Setting detail: THERAPIES SERIES
Discharge: HOME | End: 2022-06-22
Payer: MEDICARE

## 2022-06-22 ENCOUNTER — HOSPITAL ENCOUNTER (OUTPATIENT)
Dept: CARDIAC REHAB | Age: 67
Discharge: HOME | End: 2022-06-22

## 2022-06-22 DIAGNOSIS — I51.3 LEFT VENTRICULAR THROMBUS: Primary | ICD-10-CM

## 2022-06-22 LAB
INR BLD: 2.5
PROTIME: 29.8 SECONDS

## 2022-06-22 PROCEDURE — 85610 PROTHROMBIN TIME: CPT

## 2022-06-22 PROCEDURE — 36416 COLLJ CAPILLARY BLOOD SPEC: CPT

## 2022-06-22 PROCEDURE — 9900000065 HC CARDIAC REHAB PHASE 3 - 1 VISIT

## 2022-06-22 PROCEDURE — 99211 OFF/OP EST MAY X REQ PHY/QHP: CPT

## 2022-06-22 NOTE — PROGRESS NOTES
ANTICOAGULATION SERVICE    Date of Clinic Visit:  6/22/2022    Francesca Farmer is a 77 y.o. male who presents to clinic today for anticoagulation monitoring and adjustment. Recent INR Results:  Internal QC passed  Lab Results   Component Value Date    INR 2.5 06/22/2022    INR 2.3 05/11/2022       Current Warfarin Dosage:  Dosing Plan  As of 6/22/2022    TTR:  91.6 % (1.6 y)   Full warfarin instructions:  7/28: Hold; 7/29: Hold; 7/30: Marcene Merry; 7/31: Hold; 8/1: Hold; 8/2: 15 mg; 8/3: 10 mg; Otherwise 5 mg every Mon, Wed, Fri; 7.5 mg all other days               Assessment/Plan:    Continue current regimen as INR remains stable. INR remains in range today. Carlos Sanchez has a colonoscopy scheduled for 08/02. I gave instructions on holding warfarin and bridging with enoxaparin. Recheck INR 3 days after restarting. Next Clinic Appointment:  Return date  As of 6/22/2022    TTR:  91.6 % (1.6 y)   Next INR check:  8/5/2022             Please call [facility] at [phone] with any questions. Thanks!   Yumiko Hutchins, [facility]  Anticoagulation Service Pharmacist  6/22/2022 8:04 AM     For Pharmacy Admin Tracking Only     Intervention Detail: New Rx: 1, reason: Needs Additional Therapy   Total # of Interventions Recommended: 1   Total # of Interventions Accepted: 1   Time Spent (min): 15

## 2022-06-29 ENCOUNTER — HOSPITAL ENCOUNTER (OUTPATIENT)
Dept: CARDIAC REHAB | Age: 67
Discharge: HOME | End: 2022-06-29

## 2022-07-13 ENCOUNTER — HOSPITAL ENCOUNTER (OUTPATIENT)
Dept: CARDIAC REHAB | Age: 67
Discharge: HOME | End: 2022-07-13

## 2022-07-20 ENCOUNTER — HOSPITAL ENCOUNTER (OUTPATIENT)
Dept: CARDIAC REHAB | Age: 67
Discharge: HOME | End: 2022-07-20

## 2022-07-20 PROCEDURE — 9900000065 HC CARDIAC REHAB PHASE 3 - 1 VISIT

## 2022-07-27 ENCOUNTER — HOSPITAL ENCOUNTER (OUTPATIENT)
Dept: CARDIAC REHAB | Age: 67
Discharge: HOME | End: 2022-07-27

## 2022-07-27 PROCEDURE — 9900000065 HC CARDIAC REHAB PHASE 3 - 1 VISIT

## 2022-07-29 RX ORDER — METOPROLOL SUCCINATE 25 MG/1
TABLET, EXTENDED RELEASE ORAL
Qty: 45 TABLET | Refills: 3 | Status: SHIPPED | OUTPATIENT
Start: 2022-07-29

## 2022-08-02 ENCOUNTER — ANESTHESIA (OUTPATIENT)
Dept: OPERATING ROOM | Age: 67
End: 2022-08-02
Payer: MEDICARE

## 2022-08-02 ENCOUNTER — ANESTHESIA EVENT (OUTPATIENT)
Dept: OPERATING ROOM | Age: 67
End: 2022-08-02
Payer: MEDICARE

## 2022-08-02 ENCOUNTER — HOSPITAL ENCOUNTER (OUTPATIENT)
Age: 67
Setting detail: OUTPATIENT SURGERY
Discharge: HOME | End: 2022-08-02
Attending: SURGERY | Admitting: SURGERY
Payer: MEDICARE

## 2022-08-02 VITALS
HEIGHT: 74 IN | TEMPERATURE: 97.2 F | BODY MASS INDEX: 25.23 KG/M2 | HEART RATE: 56 BPM | DIASTOLIC BLOOD PRESSURE: 56 MMHG | OXYGEN SATURATION: 97 % | WEIGHT: 196.6 LBS | RESPIRATION RATE: 16 BRPM | SYSTOLIC BLOOD PRESSURE: 98 MMHG

## 2022-08-02 PROCEDURE — 3700000001 HC ADD 15 MINUTES (ANESTHESIA): Performed by: SURGERY

## 2022-08-02 PROCEDURE — 2709999900 HC NON-CHARGEABLE SUPPLY: Performed by: SURGERY

## 2022-08-02 PROCEDURE — 3700000000 HC ANESTHESIA ATTENDED CARE: Performed by: SURGERY

## 2022-08-02 PROCEDURE — 3609027000 HC COLONOSCOPY: Performed by: SURGERY

## 2022-08-02 PROCEDURE — G0121 COLON CA SCRN NOT HI RSK IND: HCPCS | Performed by: SURGERY

## 2022-08-02 PROCEDURE — 6360000002 HC RX W HCPCS: Performed by: NURSE ANESTHETIST, CERTIFIED REGISTERED

## 2022-08-02 PROCEDURE — 7100000011 HC PHASE II RECOVERY - ADDTL 15 MIN: Performed by: SURGERY

## 2022-08-02 PROCEDURE — 2580000003 HC RX 258: Performed by: SURGERY

## 2022-08-02 PROCEDURE — 7100000010 HC PHASE II RECOVERY - FIRST 15 MIN: Performed by: SURGERY

## 2022-08-02 RX ORDER — SODIUM CHLORIDE, SODIUM LACTATE, POTASSIUM CHLORIDE, CALCIUM CHLORIDE 600; 310; 30; 20 MG/100ML; MG/100ML; MG/100ML; MG/100ML
INJECTION, SOLUTION INTRAVENOUS CONTINUOUS
Status: DISCONTINUED | OUTPATIENT
Start: 2022-08-02 | End: 2022-08-02 | Stop reason: HOSPADM

## 2022-08-02 RX ORDER — PROPOFOL 10 MG/ML
INJECTION, EMULSION INTRAVENOUS PRN
Status: DISCONTINUED | OUTPATIENT
Start: 2022-08-02 | End: 2022-08-02 | Stop reason: SDUPTHER

## 2022-08-02 RX ADMIN — SODIUM CHLORIDE, POTASSIUM CHLORIDE, SODIUM LACTATE AND CALCIUM CHLORIDE: 600; 310; 30; 20 INJECTION, SOLUTION INTRAVENOUS at 10:12

## 2022-08-02 RX ADMIN — PROPOFOL 80 MG: 10 INJECTION, EMULSION INTRAVENOUS at 10:45

## 2022-08-02 RX ADMIN — SODIUM CHLORIDE, POTASSIUM CHLORIDE, SODIUM LACTATE AND CALCIUM CHLORIDE: 600; 310; 30; 20 INJECTION, SOLUTION INTRAVENOUS at 10:43

## 2022-08-02 RX ADMIN — PROPOFOL 220 MG: 10 INJECTION, EMULSION INTRAVENOUS at 10:46

## 2022-08-02 ASSESSMENT — PAIN - FUNCTIONAL ASSESSMENT: PAIN_FUNCTIONAL_ASSESSMENT: 0-10

## 2022-08-02 ASSESSMENT — ENCOUNTER SYMPTOMS
DYSPNEA ACTIVITY LEVEL: NO INTERVAL CHANGE
SHORTNESS OF BREATH: 1

## 2022-08-02 ASSESSMENT — PAIN SCALES - GENERAL: PAINLEVEL_OUTOF10: 0

## 2022-08-02 NOTE — ANESTHESIA PRE PROCEDURE
Department of Anesthesiology  Preprocedure Note       Name:  Akash Jama   Age:  77 y.o.  :  1955                                          MRN:  [id number]         Date:  2022      Surgeon: Luis Chaudhry):  Gerardo Jameson MD    Procedure: Procedure(s):  Colonoscopy    Medications prior to admission:   Prior to Admission medications    Medication Sig Start Date End Date Taking? Authorizing Provider   metoprolol succinate (TOPROL XL) 25 MG extended release tablet TAKE 1/2 TABLET BY MOUTH EVERY NIGHT 22   Deven López MD   warfarin (COUMADIN) 5 MG tablet Take 1 tablet by mouth daily 5 mg every mon, wed, fri; 7.5 mg all other days; Or as directed by INR results. 12/15/21 6/13/22  Sharyle Seats, APRN - CNP   dapagliflozin (FARXIGA) 10 MG tablet Take 1 tablet by mouth every morning 21   Rosa Mcgregor MD   atorvastatin (LIPITOR) 20 MG tablet TAKE 1 TABLET BY MOUTH DAILY 21   Bassam Gardner DO   sacubitril-valsartan (ENTRESTO) 24-26 MG per tablet Take 1 tablet by mouth 2 times daily 21   Bassam Gardner DO       Current medications:    No current facility-administered medications for this encounter. 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  Adenocarcinoma ([facility] [address]     Lip.  ENAMORADO (dyspnea on exertion)     H/O echocardiogram 10/08/2020    ECHO 10/8/2020: EF 20%, grade I DD, mild TR, RVSP is 50 mmHg, moderate PHTN.  Hyperlipidemia        Past Surgical History:        Procedure Laterality Date    CARDIAC CATHETERIZATION  10/14/2020    CARDIAC DEFIBRILLATOR PLACEMENT Left 04/12/2021    COLONOSCOPY  10/27/2010    Showed multiple internal hemorrhoids.  MOUTH SURGERY  01/2007    Lip surgery. Social History:    Social History     Tobacco Use    Smoking status: Never    Smokeless tobacco: Never   Substance Use Topics    Alcohol use: No                                Counseling given: Not Answered      Vital Signs (Current):   Vitals:    08/02/22 0858   BP: 119/68   Pulse: 67   Resp: 18   Temp: 36.4 °C (97.6 °F)   TempSrc: Oral   SpO2: 97%   Weight: 196 lb 9.6 oz (89.2 kg)   Height: 6' 2\" (1.88 m)                                              BP Readings from Last 3 Encounters:   08/02/22 119/68   04/18/22 110/64   04/18/22 110/64       NPO Status: Time of last liquid consumption: 2130                        Time of last solid consumption: 2130                        Date of last liquid consumption: 08/01/22                        Date of last solid food consumption: 07/31/22    BMI:   Wt Readings from Last 3 Encounters:   08/02/22 196 lb 9.6 oz (89.2 kg)   05/06/22 195 lb (88.5 kg)   04/18/22 205 lb (93 kg)     Body mass index is 25.24 kg/m².     CBC:   Lab Results   Component Value Date/Time    WBC 9.3 10/16/2020 06:40 AM    RBC 5.04 10/16/2020 06:40 AM    HGB 14.5 10/16/2020 06:40 AM    HCT 44.7 10/16/2020 06:40 AM    MCV 88.7 10/16/2020 06:40 AM    RDW 12.6 10/16/2020 06:40 AM     10/16/2020 06:40 AM       CMP:   Lab Results   Component Value Date/Time     04/18/2022 07:33 AM    K 4.6 04/18/2022 07:33 AM     04/18/2022 07:33 AM    CO2 29 04/18/2022 07:33 AM    BUN 24 04/18/2022 07:33 AM    CREATININE 1.18 04/18/2022 07:33 AM    GFRAA >60 04/18/2022 07:33 AM    LABGLOM >60 04/18/2022 07:33 AM    GLUCOSE 110 10/16/2020 06:40 AM    PROT 7.3 04/18/2022 07:33 AM    CALCIUM 9.6 04/18/2022 07:33 AM    BILITOT 0.56 04/18/2022 07:33 AM    ALKPHOS 78 04/18/2022 07:33 AM    AST 21 04/18/2022 07:33 AM    ALT 23 04/18/2022 07:33 AM       POC Tests: No results for input(s): POCGLU, POCNA, POCK, POCCL, POCBUN, POCHEMO, POCHCT in the last 72 hours. Coags:   Lab Results   Component Value Date/Time    PROTIME 29.8 06/22/2022 07:59 AM    PROTIME 11.7 04/12/2021 08:03 AM    INR 2.5 06/22/2022 07:58 AM    APTT 54.6 10/16/2020 12:42 PM       HCG (If Applicable): No results found for: PREGTESTUR, PREGSERUM, HCG, HCGQUANT     ABGs: No results found for: PHART, PO2ART, YOA4TOZ, XZX3UAE, BEART, D6AUHHSD     Type & Screen (If Applicable):  No results found for: LABABO, LABRH    Drug/Infectious Status (If Applicable):  No results found for: HIV, HEPCAB    COVID-19 Screening (If Applicable):   Lab Results   Component Value Date/Time    COVID19 NEGATIVE 01/14/2021 10:01 AM    COVID19 Not Detected 10/13/2020 12:42 PM           Anesthesia Evaluation  Patient summary reviewed and Nursing notes reviewed  Airway: Mallampati: II  TM distance: <3 FB   Neck ROM: full  Mouth opening: > = 3 FB   Dental:          Pulmonary:normal exam    (+) shortness of breath: no interval change,                            ROS comment: Long COVID   Cardiovascular:  Exercise tolerance: no interval change,   (+) hypertension:, pacemaker: AICD, pacemaker and no interval change, CAD: no interval change, CHF: no interval change, ENAMORADO: no interval change, hyperlipidemia      ECG reviewed      Echocardiogram reviewed               ROS comment: Left ventricle is severely dilated. Left ventricular systolic function is severely reduced, globally. Estimated Left ventricular ejection fraction is 30 % with global hypokinesia. Grade II left ventricular diastolic dysfunction. Left atrium is moderately dilated.   Mild mitral regurgitation. Mild tricuspid regurgitation. Neuro/Psych:               GI/Hepatic/Renal: Neg GI/Hepatic/Renal ROS            Endo/Other: Negative Endo/Other ROS                    Abdominal:             Vascular: Other Findings:           Anesthesia Plan      general     ASA 3       Induction: intravenous. Anesthetic plan and risks discussed with patient. Use of blood products discussed with patient whom.    Plan discussed with surgical team.                    ANGY Puente - ADAM   8/2/2022

## 2022-08-02 NOTE — H&P
&P Colonoscopy  Patient:Javier Bean                 :1955  (no) patient has seen Dr. Jose F Castro prior to procedure  PCP: Dr. Radha Lunsford     CC: screening           HPI:           Colonoscopy  Abd pain: no  Anemia: no  Bloating:no  Diarrhea: no  Constipation: no  Melena: no  Hematochezia:no  Rectal Bleeding:no  Rectal/Anal Pain:no  Pruritus: no  Family history colon Cancer: no  Previous colon cancer: no  Previous Colon Polyp: no  Change in bowels: no  Decrease caliber of stool: no  Change in color of stool: no     Previous work up date: Colonoscopy on 10/27/2010 by Dr. Sharol Lombard at [facility]= multiple internal hemorrhoids, otherwise normal         Family History         Family History   Problem Relation Age of Onset    Heart Attack Father           Myocardial infarction in his 76s. Diabetes Father      Glaucoma Mother           Social History               Socioeconomic History    Marital status:        Spouse name: Not on file    Number of children: Not on file    Years of education: Not on file    Highest education level: Not on file   Occupational History    Not on file   Tobacco Use    Smoking status: Never Smoker    Smokeless tobacco: Never Used   Substance and Sexual Activity    Alcohol use: No    Drug use: No    Sexual activity: Not on file   Other Topics Concern    Not on file   Social History Narrative    Not on file      Social Determinants of Health          Financial Resource Strain: Low Risk    Difficulty of Paying Living Expenses: Not hard at all   Food Insecurity: No Food Insecurity    Worried About [facility] in the Last Year: Never true    [address]  in the Last Year: Never true   Transportation Needs:    Lack of Transportation (Medical): Not on file    Lack of Transportation (Non-Medical):  Not on file   Physical Activity:    Days of Exercise per Week: Not on file    Minutes of Exercise per Session: Not on file   Stress:    Feeling of Stress : Not on file   Social Connections: Frequency of Communication with Friends and Family: Not on file    Frequency of Social Gatherings with Friends and Family: Not on file    Attends [employer] Services: Not on file    Active Member of Clubs or Organizations: Not on file    Attends Club or Organization Meetings: Not on file    Marital Status: Not on file   Intimate Partner Violence:    Fear of Current or Ex-Partner: Not on file    Emotionally Abused: Not on file    Physically Abused: Not on file    Sexually Abused: Not on file   Housing Stability:    Unable to Pay for Housing in the Last Year: Not on file    Number of [place] in the Last Year: Not on file    Unstable Housing in the Last Year: Not on file         Past Surgical History         Past Surgical History:   Procedure Laterality Date    CARDIAC CATHETERIZATION   10/14/2020    [address] Left 04/12/2021    COLONOSCOPY   10/27/2010     Showed multiple internal hemorrhoids. MOUTH SURGERY   01/2007     Lip surgery. Past Medical History        Past Medical History:   Diagnosis Date    Abnormal cardiovascular stress test 10/2020      Large inferior and inferoapical infarction, minimal faiza-infarct ischemia / Severely reduced LV systolic function. Adenocarcinoma (HCC)       Lip. ENAMORADO (dyspnea on exertion)      H/O echocardiogram 10/08/2020     ECHO 10/8/2020: EF 20%, grade I DD, mild TR, RVSP is 50 mmHg, moderate PHTN. Hyperlipidemia                  Current Outpatient Medications on File Prior to Visit   Medication Sig Dispense Refill    warfarin (COUMADIN) 5 MG tablet Take 1 tablet by mouth daily 5 mg every mon, wed, fri; 7.5 mg all other days; Or as directed by INR results.  100 tablet 3    dapagliflozin (FARXIGA) 10 MG tablet Take 1 tablet by mouth every morning 90 tablet 2    atorvastatin (LIPITOR) 20 MG tablet TAKE 1 TABLET BY MOUTH DAILY 90 tablet 3    metoprolol succinate (TOPROL XL) 25 MG extended release tablet Take 0.5 tablets by mouth nightly 45 tablet 3    sacubitril-valsartan (ENTRESTO) 24-26 MG per tablet Take 1 tablet by mouth 2 times daily 180 tablet 3      No current facility-administered medications on file prior to visit. Allergies as of 04/18/2022 - Fully Reviewed 04/18/2022   Allergen Reaction Noted    Penicillins Other (See Comments) 10/30/2013          PHYSICAL EXAM:    Blood pressure 119/68, pulse 67, temperature 97.6 °F (36.4 °C), temperature source Oral, resp. rate 18, height 6' 2\" (1.88 m), weight 196 lb 9.6 oz (89.2 kg), SpO2 97 %. Gen:  A and O x 3, NAD, well nourished  Eyes:  Sclera non icterus, PERRL  Lungs:  CTA, symmetrical  Chest:  RRR, no murmurs  Abd:  Soft, NT, ND, no HSM, no bruits       ASSESSMENT:   1.   Colon screening, last CS 2010        PLAN: Colonoscopy

## 2022-08-02 NOTE — DISCHARGE INSTRUCTIONS
Repeat CS in 10 years for screening    DISCHARGE INSTRUCTIONS FOLLOWING COLONOSCOPY    Activity  You have received sedation. Your judgement and coordination may be impaired. Do not drive or operate any machinery today. Do not make personal, medical, legal or business decisions today. Do not consume alcohol, tranquilizers or sleeping medications today unless otherwise instructed by your physician. Rest today. You may resume normal activity tomorrow. Because air is put into your colon during this procedure, it is normal to pass large amounts of air from your rectum. Feelings of bloating, gas or cramping may persist for 24 hours. You may not have a bowel movement for 1-3 days after this procedure. Diet  Begin with sips of clear liquids and if no nausea, you may progress to your normal diet. Medications  You may resume your usual medications, unless otherwise instructed. Follow-Up  As instructed. CALL YOUR PHYSICIAN if you experience any of the following:  Bleeding from your rectum (a teaspoonful or more)      - If you had a biopsy taken today, a small amount of bleeding may occur. Severe abdominal pain/cramping and/or distention that is not relieved by passing gas. Persistent nausea or vomiting. Signs of infection including fever, chills, redness or swelling @ the IV site.             If you have questions or problems call [phone] [facility]) or after business hours call [phone] [facility])

## 2022-08-02 NOTE — ANESTHESIA POSTPROCEDURE EVALUATION
Department of Anesthesiology  Postprocedure Note    Patient: Rajan Monge  MRN: [id number]  YOB: 1955  Date of evaluation: 8/2/2022      Procedure Summary     Date: 08/02/22 Room / Location: [address]    Anesthesia Start: 1041 Anesthesia Stop:     Procedure: Colonoscopy Diagnosis:       Colon cancer screening      (Colon cancer screening [Z12.11])    Surgeons: José Miguel Pinon MD Responsible Provider: ANGY Alvares CRNA    Anesthesia Type: general ASA Status: 3          Anesthesia Type: No value filed.     Priti Phase I: Priti Score: 10    Priti Phase II: Priti Score: 8      Anesthesia Post Evaluation    Patient location during evaluation: bedside  Patient participation: complete - patient participated  Level of consciousness: awake and alert  Pain score: 0  Airway patency: patent  Nausea & Vomiting: no vomiting  Complications: no  Cardiovascular status: hemodynamically stable  Respiratory status: acceptable  Hydration status: euvolemic

## 2022-08-03 ENCOUNTER — HOSPITAL ENCOUNTER (OUTPATIENT)
Dept: CARDIAC REHAB | Age: 67
Discharge: HOME | End: 2022-08-03

## 2022-08-03 PROCEDURE — 9900000065 HC CARDIAC REHAB PHASE 3 - 1 VISIT

## 2022-08-05 ENCOUNTER — HOSPITAL ENCOUNTER (OUTPATIENT)
Dept: PHARMACY | Age: 67
Setting detail: THERAPIES SERIES
Discharge: HOME | End: 2022-08-05
Payer: MEDICARE

## 2022-08-05 DIAGNOSIS — I51.3 LEFT VENTRICULAR THROMBUS: Primary | ICD-10-CM

## 2022-08-05 LAB
INR BLD: 1.4
PROTIME: 16.8 SECONDS

## 2022-08-05 PROCEDURE — 99211 OFF/OP EST MAY X REQ PHY/QHP: CPT

## 2022-08-05 PROCEDURE — 85610 PROTHROMBIN TIME: CPT

## 2022-08-05 PROCEDURE — 36416 COLLJ CAPILLARY BLOOD SPEC: CPT

## 2022-08-05 NOTE — PROGRESS NOTES
ANTICOAGULATION SERVICE    Date of Clinic Visit:  2022    Rito García is a 77 y.o. male who presents to clinic today for anticoagulation monitoring and adjustment. Recent INR Results:  Internal QC passed  Lab Results   Component Value Date    INR 1.4 2022    INR 2.5 2022       Dosing Plan  As of 2022      TTR:  88.5 % (1.8 y)   Full warfarin instructions:  : 10 mg; Otherwise 5 mg every Mon, Wed, Fri; 7.5 mg all other days                 Assessment/Plan:    Continue current regimen as INR remains stable. INR is below range today but just restarted 3 days ago after holding for procedure. I will give higher dose again today then back on regular dose. Sammi Phillips is bridging with enoxaparin. I instructed him to continue enoxaparin x 2 more days then discontinue. Next Clinic Appointment:  Return date  As of 2022      TTR:  88.5 % (1.8 y)   Next INR check:  2022               Please call [facility] at [phone] with any questions. Thanks!   Mkaenzie Land [facility]  Anticoagulation Service Pharmacist  2022 7:50 AM    For Pharmacy Admin Tracking Only    Intervention Detail: Adherence Monitorin  Total # of Interventions Recommended: 1  Total # of Interventions Accepted: 1  Time Spent (min): 15

## 2022-08-09 ENCOUNTER — HOSPITAL ENCOUNTER (OUTPATIENT)
Dept: CARDIAC REHAB | Age: 67
Discharge: HOME | End: 2022-08-09

## 2022-08-09 PROCEDURE — 9900000065 HC CARDIAC REHAB PHASE 3 - 1 VISIT

## 2022-08-16 ENCOUNTER — HOSPITAL ENCOUNTER (OUTPATIENT)
Dept: CARDIAC REHAB | Age: 67
Discharge: HOME | End: 2022-08-16

## 2022-08-16 PROCEDURE — 9900000065 HC CARDIAC REHAB PHASE 3 - 1 VISIT

## 2022-08-24 ENCOUNTER — HOSPITAL ENCOUNTER (OUTPATIENT)
Dept: PHARMACY | Age: 67
Setting detail: THERAPIES SERIES
Discharge: HOME | End: 2022-08-24
Payer: MEDICARE

## 2022-08-24 DIAGNOSIS — I51.3 LEFT VENTRICULAR THROMBUS: Primary | ICD-10-CM

## 2022-08-24 LAB
INR BLD: 1.6
PROTIME: 19.4 SECONDS

## 2022-08-24 PROCEDURE — 85610 PROTHROMBIN TIME: CPT

## 2022-08-24 PROCEDURE — 36416 COLLJ CAPILLARY BLOOD SPEC: CPT

## 2022-08-24 PROCEDURE — 99212 OFFICE O/P EST SF 10 MIN: CPT

## 2022-08-24 NOTE — PROGRESS NOTES
ANTICOAGULATION SERVICE    Date of Clinic Visit:  8/24/2022    Anabelle Garnett is a 77 y.o. male who presents to clinic today for anticoagulation monitoring and adjustment. Recent INR Results:  Internal QC passed  Lab Results   Component Value Date    INR 1.6 08/24/2022    INR 1.4 08/05/2022       Current Warfarin Dosage:  Dosing Plan  As of 8/24/2022      TTR:  86.0 % (1.8 y)   Full warfarin instructions:  5 mg every Mon, Fri; 7.5 mg all other days                 Assessment/Plan:    Modify warfarin dose as noted above: INR remains low today. Jamaica Gomez had a procedure about 4 weeks ago and was bridged with enoxaparin. No missed doses. Unsure why INR remains low today. I will increase weekly dose 6% and recheck in two weeks. Next Clinic Appointment:  Return date  As of 8/24/2022      TTR:  86.0 % (1.8 y)   Next INR check:  9/7/2022               Please call [facility] Anticoagulation Clinic at [phone] with any questions. Thanks!   Betty Mullen, [address] [facility]  Anticoagulation Service Pharmacist  8/24/2022 7:59 AM    For Pharmacy Admin Tracking Only    Intervention Detail: Dose Adjustment: 1, reason: Therapy Optimization  Total # of Interventions Recommended: 1  Total # of Interventions Accepted: 1  Time Spent (min): 15

## 2022-08-31 ENCOUNTER — HOSPITAL ENCOUNTER (OUTPATIENT)
Dept: CARDIAC REHAB | Age: 67
Discharge: HOME | End: 2022-08-31

## 2022-08-31 PROCEDURE — 9900000065 HC CARDIAC REHAB PHASE 3 - 1 VISIT

## 2022-09-07 ENCOUNTER — HOSPITAL ENCOUNTER (OUTPATIENT)
Dept: CARDIAC REHAB | Age: 67
Discharge: HOME | End: 2022-09-07

## 2022-09-07 ENCOUNTER — HOSPITAL ENCOUNTER (OUTPATIENT)
Dept: PHARMACY | Age: 67
Setting detail: THERAPIES SERIES
Discharge: HOME | End: 2022-09-07
Payer: MEDICARE

## 2022-09-07 DIAGNOSIS — I51.3 LEFT VENTRICULAR THROMBUS: Primary | ICD-10-CM

## 2022-09-07 LAB
INR BLD: 2.2
PROTIME: 26.3 SECONDS

## 2022-09-07 PROCEDURE — 85610 PROTHROMBIN TIME: CPT

## 2022-09-07 PROCEDURE — 99211 OFF/OP EST MAY X REQ PHY/QHP: CPT

## 2022-09-07 PROCEDURE — 36416 COLLJ CAPILLARY BLOOD SPEC: CPT

## 2022-09-07 PROCEDURE — 9900000065 HC CARDIAC REHAB PHASE 3 - 1 VISIT

## 2022-09-07 NOTE — PROGRESS NOTES
ANTICOAGULATION SERVICE    Date of Clinic Visit:  9/7/2022    Noemi Obrien is a 77 y.o. male who presents to clinic today for anticoagulation monitoring and adjustment. Recent INR Results:  Internal QC passed  Lab Results   Component Value Date    INR 2.2 09/07/2022    INR 1.6 08/24/2022       Current Warfarin Dosage:  Dosing Plan  As of 9/7/2022      TTR:  84.9 % (1.9 y)   Full warfarin instructions:  5 mg every Mon, Fri; 7.5 mg all other days                 Assessment/Plan:    Continue current regimen as INR remains stable. Next Clinic Appointment:  Return date  As of 9/7/2022      TTR:  84.9 % (1.9 y)   Next INR check:  10/5/2022               Please call [facility] Anticoagulation Clinic at [phone] with any questions. Thanks!   Tu Goins [facility]  Anticoagulation Service Pharmacist  9/7/2022 8:06 AM  For Pharmacy Admin Tracking Only    Total # of Interventions Recommended: 0  Total # of Interventions Accepted: 0  Time Spent (min): 15

## 2022-09-07 NOTE — PATIENT INSTRUCTIONS
\"On day of next appointment, please screen for temperature and COVID-19 symptoms prior to you clinic appointment. If any symptoms present, please call [phone] to reschedule. \"

## 2022-09-14 ENCOUNTER — HOSPITAL ENCOUNTER (OUTPATIENT)
Dept: CARDIAC REHAB | Age: 67
Discharge: HOME | End: 2022-09-14

## 2022-09-14 PROCEDURE — 9900000065 HC CARDIAC REHAB PHASE 3 - 1 VISIT

## 2022-09-19 ENCOUNTER — TELEPHONE (OUTPATIENT)
Dept: SURGERY | Age: 67
End: 2022-09-19

## 2022-09-19 NOTE — LETTER
Trudy Espinoza [facility] [address]  [facility]  Phone: [phone]  Fax: [phone]    Esa Rodríguez MD      9/19/2022    Dear Juan David Browning,      Dr. Armando Beltran reviewed the results of your recent colonoscopy. The lower scope showed mild sigmoid diverticulosis  . His recommendations are to be scoped again in 10 years, due to screening for colon cancer. If you have any questions or concerns regarding your test results or our recommendations, please call the office at [phone].       Sincerely,           Fatuma Sheehan LPN

## 2022-09-19 NOTE — TELEPHONE ENCOUNTER
Letter created and mailed to patient with results from recent colonoscopy at [facility] with Dr. Martina Sun on 8-2-22. Updated history, health maintenance, and recall. Forwarded results to PCP.

## 2022-09-20 DIAGNOSIS — I51.3 LEFT VENTRICULAR THROMBUS: Primary | ICD-10-CM

## 2022-09-21 ENCOUNTER — HOSPITAL ENCOUNTER (OUTPATIENT)
Dept: CARDIAC REHAB | Age: 67
Discharge: HOME | End: 2022-09-21

## 2022-09-21 PROCEDURE — 9900000065 HC CARDIAC REHAB PHASE 3 - 1 VISIT

## 2022-09-28 ENCOUNTER — HOSPITAL ENCOUNTER (OUTPATIENT)
Dept: CARDIAC REHAB | Age: 67
Discharge: HOME | End: 2022-09-28

## 2022-10-05 ENCOUNTER — HOSPITAL ENCOUNTER (OUTPATIENT)
Dept: CARDIAC REHAB | Age: 67
Discharge: HOME | End: 2022-10-05

## 2022-10-05 ENCOUNTER — HOSPITAL ENCOUNTER (OUTPATIENT)
Dept: PHARMACY | Age: 67
Setting detail: THERAPIES SERIES
Discharge: HOME | End: 2022-10-05
Payer: MEDICARE

## 2022-10-05 DIAGNOSIS — I51.3 LEFT VENTRICULAR THROMBUS: Primary | ICD-10-CM

## 2022-10-05 LAB
INR BLD: 2.3
PROTIME: 27.8 SECONDS

## 2022-10-05 PROCEDURE — 9900000065 HC CARDIAC REHAB PHASE 3 - 1 VISIT

## 2022-10-05 PROCEDURE — 99211 OFF/OP EST MAY X REQ PHY/QHP: CPT

## 2022-10-05 PROCEDURE — 85610 PROTHROMBIN TIME: CPT

## 2022-10-05 PROCEDURE — 36416 COLLJ CAPILLARY BLOOD SPEC: CPT

## 2022-10-05 NOTE — PATIENT INSTRUCTIONS
\"On day of next appointment, please screen for temperature and COVID-19 symptoms prior to you clinic appointment. If any symptoms present, please call [phone] to reschedule. \"

## 2022-10-05 NOTE — PROGRESS NOTES
ANTICOAGULATION SERVICE    Date of Clinic Visit:  10/5/2022    Zaida Alexis is a 77 y.o. male who presents to clinic today for anticoagulation monitoring and adjustment. Recent INR Results:  Internal QC passed  Lab Results   Component Value Date    INR 2.3 10/05/2022    INR 2.2 09/07/2022       Current Warfarin Dosage:  Dosing Plan  As of 10/5/2022      TTR:  85.5 % (1.9 y)   Full warfarin instructions:  5 mg every Mon, Fri; 7.5 mg all other days                 Assessment/Plan:    Continue current regimen as INR remains stable. Next Clinic Appointment:  Return date  As of 10/5/2022      TTR:  85.5 % (1.9 y)   Next INR check:  11/2/2022               Please call [facility] Anticoagulation Clinic at [phone] with any questions. Thanks!   Alfonso Monteiro, [address] [facility]  Anticoagulation Service Pharmacist  10/5/2022 8:20 AM  For Pharmacy Admin Tracking Only    Intervention Detail: Dose Adjustment: 0, reason: Patient Preference  Total # of Interventions Recommended: 0  Total # of Interventions Accepted: 0  Time Spent (min): 20

## 2022-10-12 ENCOUNTER — HOSPITAL ENCOUNTER (OUTPATIENT)
Dept: CARDIAC REHAB | Age: 67
Discharge: HOME | End: 2022-10-12

## 2022-10-12 PROCEDURE — 93798 PHYS/QHP OP CAR RHAB W/ECG: CPT

## 2022-10-19 ENCOUNTER — HOSPITAL ENCOUNTER (OUTPATIENT)
Dept: CARDIAC REHAB | Age: 67
Discharge: HOME | End: 2022-10-19

## 2022-10-19 PROCEDURE — 9900000065 HC CARDIAC REHAB PHASE 3 - 1 VISIT

## 2022-10-26 ENCOUNTER — HOSPITAL ENCOUNTER (OUTPATIENT)
Dept: CARDIAC REHAB | Age: 67
Discharge: HOME | End: 2022-10-26

## 2022-10-26 PROCEDURE — 9900000065 HC CARDIAC REHAB PHASE 3 - 1 VISIT

## 2022-10-31 ENCOUNTER — OFFICE VISIT (OUTPATIENT)
Dept: INTERNAL MEDICINE | Age: 67
End: 2022-10-31
Payer: MEDICARE

## 2022-10-31 VITALS
SYSTOLIC BLOOD PRESSURE: 96 MMHG | BODY MASS INDEX: 26.18 KG/M2 | DIASTOLIC BLOOD PRESSURE: 68 MMHG | WEIGHT: 204 LBS | HEIGHT: 74 IN | RESPIRATION RATE: 16 BRPM | OXYGEN SATURATION: 97 % | HEART RATE: 58 BPM

## 2022-10-31 DIAGNOSIS — E78.49 OTHER HYPERLIPIDEMIA: ICD-10-CM

## 2022-10-31 DIAGNOSIS — Z00.00 GENERAL MEDICAL EXAM: Primary | ICD-10-CM

## 2022-10-31 DIAGNOSIS — I50.22 CHRONIC SYSTOLIC (CONGESTIVE) HEART FAILURE (HCC): ICD-10-CM

## 2022-10-31 DIAGNOSIS — I25.83 CORONARY ARTERY DISEASE DUE TO LIPID RICH PLAQUE: ICD-10-CM

## 2022-10-31 DIAGNOSIS — Z00.00 MEDICARE ANNUAL WELLNESS VISIT, SUBSEQUENT: ICD-10-CM

## 2022-10-31 DIAGNOSIS — I25.10 CORONARY ARTERY DISEASE DUE TO LIPID RICH PLAQUE: ICD-10-CM

## 2022-10-31 DIAGNOSIS — Z12.5 SPECIAL SCREENING FOR MALIGNANT NEOPLASM OF PROSTATE: ICD-10-CM

## 2022-10-31 PROCEDURE — 99214 OFFICE O/P EST MOD 30 MIN: CPT | Performed by: INTERNAL MEDICINE

## 2022-10-31 PROCEDURE — 1123F ACP DISCUSS/DSCN MKR DOCD: CPT | Performed by: INTERNAL MEDICINE

## 2022-10-31 PROCEDURE — G0439 PPPS, SUBSEQ VISIT: HCPCS | Performed by: INTERNAL MEDICINE

## 2022-10-31 PROCEDURE — G8427 DOCREV CUR MEDS BY ELIG CLIN: HCPCS | Performed by: INTERNAL MEDICINE

## 2022-10-31 PROCEDURE — G8417 CALC BMI ABV UP PARAM F/U: HCPCS | Performed by: INTERNAL MEDICINE

## 2022-10-31 PROCEDURE — G8484 FLU IMMUNIZE NO ADMIN: HCPCS | Performed by: INTERNAL MEDICINE

## 2022-10-31 PROCEDURE — 1036F TOBACCO NON-USER: CPT | Performed by: INTERNAL MEDICINE

## 2022-10-31 PROCEDURE — 3017F COLORECTAL CA SCREEN DOC REV: CPT | Performed by: INTERNAL MEDICINE

## 2022-10-31 SDOH — ECONOMIC STABILITY: FOOD INSECURITY: WITHIN THE PAST 12 MONTHS, THE FOOD YOU BOUGHT JUST DIDN'T LAST AND YOU DIDN'T HAVE MONEY TO GET MORE.: NEVER TRUE

## 2022-10-31 SDOH — ECONOMIC STABILITY: FOOD INSECURITY: WITHIN THE PAST 12 MONTHS, YOU WORRIED THAT YOUR FOOD WOULD RUN OUT BEFORE YOU GOT MONEY TO BUY MORE.: NEVER TRUE

## 2022-10-31 ASSESSMENT — PATIENT HEALTH QUESTIONNAIRE - PHQ9
1. LITTLE INTEREST OR PLEASURE IN DOING THINGS: 0
SUM OF ALL RESPONSES TO PHQ QUESTIONS 1-9: 0
SUM OF ALL RESPONSES TO PHQ QUESTIONS 1-9: 0
SUM OF ALL RESPONSES TO PHQ9 QUESTIONS 1 & 2: 0
2. FEELING DOWN, DEPRESSED OR HOPELESS: 0
SUM OF ALL RESPONSES TO PHQ QUESTIONS 1-9: 0
SUM OF ALL RESPONSES TO PHQ QUESTIONS 1-9: 0

## 2022-10-31 ASSESSMENT — ENCOUNTER SYMPTOMS
NAUSEA: 0
BACK PAIN: 0
VOMITING: 0
DIARRHEA: 0
BLOOD IN STOOL: 0
EYE PAIN: 0
ABDOMINAL PAIN: 0
SHORTNESS OF BREATH: 0
CONSTIPATION: 0
COUGH: 0

## 2022-10-31 ASSESSMENT — SOCIAL DETERMINANTS OF HEALTH (SDOH): HOW HARD IS IT FOR YOU TO PAY FOR THE VERY BASICS LIKE FOOD, HOUSING, MEDICAL CARE, AND HEATING?: NOT HARD AT ALL

## 2022-10-31 ASSESSMENT — LIFESTYLE VARIABLES
HOW OFTEN DO YOU HAVE A DRINK CONTAINING ALCOHOL: NEVER
HOW MANY STANDARD DRINKS CONTAINING ALCOHOL DO YOU HAVE ON A TYPICAL DAY: PATIENT DOES NOT DRINK

## 2022-10-31 NOTE — PATIENT INSTRUCTIONS
Personalized Preventive Plan for Jeanne Wells - 10/31/2022  Medicare offers a range of preventive health benefits. Some of the tests and screenings are paid in full while other may be subject to a deductible, co-insurance, and/or copay. Some of these benefits include a comprehensive review of your medical history including lifestyle, illnesses that may run in your family, and various assessments and screenings as appropriate. After reviewing your medical record and screening and assessments performed today your provider may have ordered immunizations, labs, imaging, and/or referrals for you. A list of these orders (if applicable) as well as your Preventive Care list are included within your After Visit Summary for your review. Other Preventive Recommendations:    A preventive eye exam performed by an eye specialist is recommended every 1-2 years to screen for glaucoma; cataracts, macular degeneration, and other eye disorders. A preventive dental visit is recommended every 6 months. Try to get at least 150 minutes of exercise per week or 10,000 steps per day on a pedometer . Order or download the FREE \"Exercise & Physical Activity: Your Everyday Guide\" from [employer]. Call 8-598.240.3112 or search [employer] online. You need 6968-6017 mg of calcium and 7012-2069 IU of vitamin D per day. It is possible to meet your calcium requirement with diet alone, but a vitamin D supplement is usually necessary to meet this goal.  When exposed to the sun, use a sunscreen that protects against both UVA and UVB radiation with an SPF of 30 or greater. Reapply every 2 to 3 hours or after sweating, drying off with a towel, or swimming. Always wear a seat belt when traveling in a car. Always wear a helmet when riding a bicycle or motorcycle.

## 2022-10-31 NOTE — PROGRESS NOTES
Medicare Annual Wellness Visit    Luna Schrader is here for Medicare AWV (6 month), Coronary Artery Disease, Hyperlipidemia, and Congestive Heart Failure (systolic)    Assessment & Plan    Recommendations for Preventive Services Due: see orders and patient instructions/AVS.  Recommended screening schedule for the next 5-10 years is provided to the patient in written form: see Patient Instructions/AVS.     Return in about 27 weeks (around 5/8/2023) for Hyperlipidemia, CAD, CHF. Subjective       Patient's complete Health Risk Assessment and screening values have been reviewed and are found in Flowsheets. The following problems were reviewed today and where indicated follow up appointments were made and/or referrals ordered. Positive Risk Factor Screenings with Interventions:             General Health and ACP:  General  In general, how would you say your health is?: Excellent  In the past 7 days, have you experienced any of the following: New or Increased Pain, New or Increased Fatigue, Loneliness, Social Isolation, Stress or Anger?: No  Do you get the social and emotional support that you need?: Yes  Do you have a Living Will?: (!) No    Advance Directives       Power of  Living Will ACP-Advance Directive ACP-Power of     Not on File Not on File Not on File Not on File        General Health Risk Interventions:  No Living Will: Patient declines ACP discussion/assistance  . Objective   Vitals:    10/31/22 0948   BP: 96/68   Site: Left Upper Arm   Position: Sitting   Cuff Size: Large Adult   Pulse: 58   Resp: 16   SpO2: 97%   Weight: 204 lb (92.5 kg)   Height: 6' 2\" (1.88 m)      Body mass index is 26.19 kg/m². Allergies   Allergen Reactions    Penicillins Other (See Comments)     As a child- unsure of reaction     Prior to Visit Medications    Medication Sig Taking?  Authorizing Provider   sacubitril-valsartan (ENTRESTO) 24-26 MG per tablet Take 1 tablet by mouth in the morning and 1 tablet before bedtime. Yes Kirby Owens MD   metoprolol succinate (TOPROL XL) 25 MG extended release tablet TAKE 1/2 TABLET BY MOUTH EVERY NIGHT Yes Toño Michel MD   warfarin (COUMADIN) 5 MG tablet Take 1 tablet by mouth daily 5 mg every mon, wed, fri; 7.5 mg all other days; Or as directed by INR results. Yes Leigh Shelley, APRN - CNP   dapagliflozin (FARXIGA) 10 MG tablet Take 1 tablet by mouth every morning Yes Niranjan Estrada MD   atorvastatin (LIPITOR) 20 MG tablet TAKE 1 TABLET BY MOUTH DAILY Yes DO David ThomasTe (Including outside providers/suppliers regularly involved in providing care):   Patient Care Team:  Kirby Owens MD as PCP - General (Internal Medicine)  Kirby Owens [facility]     Reviewed and updated this visit:  Tobacco  Allergies  Meds  Problems  Med Hx  Surg Hx  Soc Hx  Fam Hx                 I, Dr. Del Brennan, directly supervised the performance of this Medicare annual wellness visit.

## 2022-11-01 ENCOUNTER — HOSPITAL ENCOUNTER (OUTPATIENT)
Dept: CARDIAC REHAB | Age: 67
Discharge: HOME | End: 2022-11-01

## 2022-11-02 ENCOUNTER — HOSPITAL ENCOUNTER (OUTPATIENT)
Dept: PHARMACY | Age: 67
Setting detail: THERAPIES SERIES
Discharge: HOME | End: 2022-11-02
Payer: MEDICARE

## 2022-11-02 DIAGNOSIS — I51.3 LEFT VENTRICULAR THROMBUS: Primary | ICD-10-CM

## 2022-11-02 LAB
INR BLD: 2
PROTIME: 24.5 SECONDS

## 2022-11-02 PROCEDURE — 99211 OFF/OP EST MAY X REQ PHY/QHP: CPT

## 2022-11-02 PROCEDURE — 36416 COLLJ CAPILLARY BLOOD SPEC: CPT

## 2022-11-02 PROCEDURE — 85610 PROTHROMBIN TIME: CPT

## 2022-11-02 NOTE — PROGRESS NOTES
ANTICOAGULATION SERVICE    Date of Clinic Visit:  11/2/2022    Chester Crocker is a 79 y.o. male who presents to clinic today for anticoagulation monitoring and adjustment. Recent INR Results:  Internal QC passed  Lab Results   Component Value Date    INR 2 11/02/2022    INR 2.3 10/05/2022       Current Warfarin Dosage:  *  Dosing Plan  As of 11/2/2022      TTR:  86.1 % (2 y)   Full warfarin instructions:  5 mg every Mon; 7.5 mg all other days; Starting 11/2/2022                 Assessment/Plan:    Modify warfarin dose as noted above: Patient is in range but has been trending at bottom we will add another 7.5 mg day and re-check as normal.    Next Clinic Appointment:  Return date  As of 11/2/2022      TTR:  86.1 % (2 y)   Next INR check:  11/30/2022               Please call [facility] Anticoagulation Clinic at [phone] with any questions. Thanks!   Katrina Angelo, [address]  Anticoagulation Service Pharmacist  11/2/2022 8:24 AM  For Pharmacy Admin Tracking Only    Intervention Detail: Dose Adjustment: 1, reason: Therapy Optimization  Total # of Interventions Recommended: 1  Total # of Interventions Accepted: 1  Time Spent (min): 20

## 2022-11-15 RX ORDER — FUROSEMIDE 20 MG/1
TABLET ORAL
Qty: 90 TABLET | Refills: 3 | OUTPATIENT
Start: 2022-11-15

## 2022-11-23 ENCOUNTER — HOSPITAL ENCOUNTER (OUTPATIENT)
Dept: CARDIAC REHAB | Age: 67
Discharge: HOME | End: 2022-11-23

## 2022-11-23 PROCEDURE — 9900000065 HC CARDIAC REHAB PHASE 3 - 1 VISIT

## 2022-11-30 ENCOUNTER — HOSPITAL ENCOUNTER (OUTPATIENT)
Dept: PHARMACY | Age: 67
Setting detail: THERAPIES SERIES
Discharge: HOME | End: 2022-11-30
Payer: MEDICARE

## 2022-11-30 ENCOUNTER — HOSPITAL ENCOUNTER (OUTPATIENT)
Dept: CARDIAC REHAB | Age: 67
Discharge: HOME | End: 2022-11-30

## 2022-11-30 DIAGNOSIS — I51.3 LEFT VENTRICULAR THROMBUS: Primary | ICD-10-CM

## 2022-11-30 LAB
INR BLD: 1.8
PROTIME: 22.2 SECONDS

## 2022-11-30 PROCEDURE — 85610 PROTHROMBIN TIME: CPT

## 2022-11-30 PROCEDURE — 36416 COLLJ CAPILLARY BLOOD SPEC: CPT

## 2022-11-30 NOTE — PROGRESS NOTES
ANTICOAGULATION SERVICE    Date of Clinic Visit:  11/30/2022    Zaida Alexis is a 79 y.o. male who presents to clinic today for anticoagulation monitoring and adjustment. Recent INR Results:  Internal QC passed  Lab Results   Component Value Date    INR 1.8 11/30/2022    INR 2 11/02/2022       Current Warfarin Dosage:  Dosing Plan  As of 11/30/2022      TTR:  82.9 % (2.1 y)   Full warfarin instructions:  11/30: 10 mg; Otherwise 5 mg every Mon; 7.5 mg all other days; Starting 11/30/2022                 Assessment/Plan:    Modify warfarin dose as noted above: Patient is just below target. Missed dose last week likely cause of low INR. Will boost today's dose and re-check as normal.     Next Clinic Appointment:  Return date  As of 11/30/2022      TTR:  82.9 % (2.1 y)   Next INR check:  12/21/2022               Please call [facility] Anticoagulation Clinic at [phone] with any questions. Thanks!   Alfonso Monteiro [facility]  Anticoagulation Service Pharmacist  11/30/2022 8:16 AM  For Pharmacy Admin Tracking Only    Intervention Detail: Dose Adjustment: 1, reason: Therapy Optimization  Total # of Interventions Recommended: 1  Total # of Interventions Accepted: 1  Time Spent (min): 15

## 2022-12-07 ENCOUNTER — HOSPITAL ENCOUNTER (OUTPATIENT)
Dept: CARDIAC REHAB | Age: 67
Discharge: HOME | End: 2022-12-07

## 2022-12-14 ENCOUNTER — HOSPITAL ENCOUNTER (OUTPATIENT)
Dept: CARDIAC REHAB | Age: 67
Discharge: HOME | End: 2022-12-14

## 2022-12-14 PROCEDURE — 9900000065 HC CARDIAC REHAB PHASE 3 - 1 VISIT

## 2022-12-16 ENCOUNTER — PROCEDURE VISIT (OUTPATIENT)
Dept: CARDIOLOGY | Age: 67
End: 2022-12-16
Payer: MEDICARE

## 2022-12-16 DIAGNOSIS — Z95.810 AICD (AUTOMATIC CARDIOVERTER/DEFIBRILLATOR) PRESENT: ICD-10-CM

## 2022-12-16 DIAGNOSIS — I42.8 NONISCHEMIC CARDIOMYOPATHY (HCC): Primary | ICD-10-CM

## 2022-12-16 PROCEDURE — 93289 INTERROG DEVICE EVAL HEART: CPT | Performed by: INTERNAL MEDICINE

## 2022-12-21 ENCOUNTER — HOSPITAL ENCOUNTER (OUTPATIENT)
Dept: PHARMACY | Age: 67
Setting detail: THERAPIES SERIES
Discharge: HOME | End: 2022-12-21
Payer: MEDICARE

## 2022-12-21 ENCOUNTER — HOSPITAL ENCOUNTER (OUTPATIENT)
Dept: CARDIAC REHAB | Age: 67
Discharge: HOME | End: 2022-12-21

## 2022-12-21 DIAGNOSIS — I51.3 LEFT VENTRICULAR THROMBUS: Primary | ICD-10-CM

## 2022-12-21 LAB
INR BLD: 3
INR BLD: 3
PROTIME: 36.4 SECONDS

## 2022-12-21 PROCEDURE — 9900000065 HC CARDIAC REHAB PHASE 3 - 1 VISIT

## 2022-12-21 PROCEDURE — 99211 OFF/OP EST MAY X REQ PHY/QHP: CPT

## 2022-12-21 PROCEDURE — 36416 COLLJ CAPILLARY BLOOD SPEC: CPT

## 2022-12-21 PROCEDURE — 85610 PROTHROMBIN TIME: CPT

## 2022-12-21 NOTE — PROGRESS NOTES
ANTICOAGULATION SERVICE    Date of Clinic Visit:  12/21/2022    Luis Cortez is a 79 y.o. male who presents to clinic today for anticoagulation monitoring and adjustment. Recent INR Results:  Internal QC passed  Lab Results   Component Value Date    INR 3 12/21/2022    INR 3 12/21/2022       Current Warfarin Dosage:  Dosing Plan  As of 12/21/2022      TTR:  82.9 % (2.1 y)   Full warfarin instructions:  5 mg every Mon; 7.5 mg all other days; Starting 12/21/2022                 Assessment/Plan:    Continue current regimen as INR remains stable. Next Clinic Appointment:  Return date  As of 12/21/2022      TTR:  82.9 % (2.1 y)   Next INR check:  1/18/2023               Please call [facility] Anticoagulation Clinic at [phone] with any questions. Thanks!   Javier Whittington, [facility]  Anticoagulation Service Pharmacist  12/21/2022 8:17 AM  For Pharmacy Admin Tracking Only    Intervention Detail: Dose Adjustment: 0, reason: Patient Preference  Total # of Interventions Recommended: 0  Total # of Interventions Accepted: 0  Time Spent (min): 15

## 2023-01-18 ENCOUNTER — HOSPITAL ENCOUNTER (OUTPATIENT)
Dept: PHARMACY | Age: 68
Setting detail: THERAPIES SERIES
Discharge: HOME | End: 2023-01-18
Payer: MEDICARE

## 2023-01-18 ENCOUNTER — HOSPITAL ENCOUNTER (OUTPATIENT)
Dept: CARDIAC REHAB | Age: 68
Discharge: HOME | End: 2023-01-18

## 2023-01-18 DIAGNOSIS — I51.3 LEFT VENTRICULAR THROMBUS: Primary | ICD-10-CM

## 2023-01-18 LAB
INR BLD: 2.4
PROTIME: 28.3 SECONDS

## 2023-01-18 PROCEDURE — 9900000065 HC CARDIAC REHAB PHASE 3 - 1 VISIT

## 2023-01-18 PROCEDURE — 99211 OFF/OP EST MAY X REQ PHY/QHP: CPT

## 2023-01-18 PROCEDURE — 85610 PROTHROMBIN TIME: CPT

## 2023-01-18 PROCEDURE — 36416 COLLJ CAPILLARY BLOOD SPEC: CPT

## 2023-01-18 NOTE — PROGRESS NOTES
ANTICOAGULATION SERVICE    Date of Clinic Visit:  2023    Kay Chang is a 79 y.o. male who presents to clinic today for anticoagulation monitoring and adjustment. Recent INR Results:  Internal QC passed  Lab Results   Component Value Date    INR 2.4 2023    INR 3 2022    INR 3 2022       Current Warfarin Dosage:  Dosing Plan  As of 2023      TTR:  83.5 % (2.2 y)   Full warfarin instructions:  5 mg every Mon; 7.5 mg all other days; Starting 2023                 Assessment/Plan:    Continue current regimen as INR remains stable. Recheck 4 weeks. Next Clinic Appointment:  Return date  As of 2023      TTR:  83.5 % (2.2 y)   Next INR check:  2/15/2023               Please call [facility] Anticoagulation Clinic at [phone] with any questions. Thanks!   Moody Bond [facility]  Anticoagulation Service Pharmacist  2023 8:06 AM    For Pharmacy Admin Tracking Only    Intervention Detail: Adherence Monitorin  Total # of Interventions Recommended: 0  Total # of Interventions Accepted: 0  Time Spent (min): 15

## 2023-01-25 ENCOUNTER — HOSPITAL ENCOUNTER (OUTPATIENT)
Dept: CARDIAC REHAB | Age: 68
Discharge: HOME | End: 2023-01-25

## 2023-02-08 ENCOUNTER — HOSPITAL ENCOUNTER (OUTPATIENT)
Dept: CARDIAC REHAB | Age: 68
Discharge: HOME | End: 2023-02-08

## 2023-02-14 ENCOUNTER — HOSPITAL ENCOUNTER (OUTPATIENT)
Dept: PHARMACY | Age: 68
Setting detail: THERAPIES SERIES
Discharge: HOME | End: 2023-02-14
Payer: MEDICARE

## 2023-02-14 ENCOUNTER — HOSPITAL ENCOUNTER (OUTPATIENT)
Dept: CARDIAC REHAB | Age: 68
Discharge: HOME | End: 2023-02-14

## 2023-02-14 DIAGNOSIS — I51.3 LEFT VENTRICULAR THROMBUS: Primary | ICD-10-CM

## 2023-02-14 LAB
INR BLD: 3
PROTIME: 35.5 SECONDS

## 2023-02-14 PROCEDURE — 36416 COLLJ CAPILLARY BLOOD SPEC: CPT

## 2023-02-14 PROCEDURE — 99211 OFF/OP EST MAY X REQ PHY/QHP: CPT

## 2023-02-14 PROCEDURE — 85610 PROTHROMBIN TIME: CPT

## 2023-02-14 PROCEDURE — 9900000065 HC CARDIAC REHAB PHASE 3 - 1 VISIT

## 2023-02-14 NOTE — PROGRESS NOTES
ANTICOAGULATION SERVICE    Date of Clinic Visit:  2023    Rizwan Heath is a 79 y.o. male who presents to clinic today for anticoagulation monitoring and adjustment. Recent INR Results:  Internal QC passed  Lab Results   Component Value Date    INR 3 2023    INR 2.4 2023       Current Warfarin Dosage:  Dosing Plan  As of 2023      TTR:  84.1 % (2.3 y)   Full warfarin instructions:  : 5 mg; Otherwise 5 mg every Mon; 7.5 mg all other days; Starting 2023                 Assessment/Plan:    Continue current regimen as INR remains stable. Kelly Wing will be gone until . Recheck 6 weeks    Next Clinic Appointment:  Return date  As of 2023      TTR:  84.1 % (2.3 y)   Next INR check:  3/29/2023               Please call [facility] Anticoagulation Clinic at [phone] with any questions. Thanks!   Isabel Davis [facility]  Anticoagulation Service Pharmacist  2023 8:07 AM    For Pharmacy Admin Tracking Only    Intervention Detail: Adherence Monitorin  Total # of Interventions Recommended: 0  Total # of Interventions Accepted: 0  Time Spent (min): 15

## 2023-02-15 ENCOUNTER — APPOINTMENT (OUTPATIENT)
Dept: PHARMACY | Age: 68
End: 2023-02-15
Payer: MEDICARE

## 2023-03-29 ENCOUNTER — HOSPITAL ENCOUNTER (OUTPATIENT)
Dept: CARDIAC REHAB | Age: 68
Discharge: HOME | End: 2023-03-29

## 2023-03-29 ENCOUNTER — HOSPITAL ENCOUNTER (OUTPATIENT)
Dept: PHARMACY | Age: 68
Setting detail: THERAPIES SERIES
Discharge: HOME | End: 2023-03-29
Payer: MEDICARE

## 2023-03-29 DIAGNOSIS — I51.3 LEFT VENTRICULAR THROMBUS: Primary | ICD-10-CM

## 2023-03-29 LAB
INR BLD: 2.5
PROTIME: 30.6 SECONDS

## 2023-03-29 PROCEDURE — 99211 OFF/OP EST MAY X REQ PHY/QHP: CPT

## 2023-03-29 PROCEDURE — 36416 COLLJ CAPILLARY BLOOD SPEC: CPT

## 2023-03-29 PROCEDURE — 85610 PROTHROMBIN TIME: CPT

## 2023-03-29 PROCEDURE — 9900000065 HC CARDIAC REHAB PHASE 3 - 1 VISIT

## 2023-03-29 NOTE — PROGRESS NOTES
ANTICOAGULATION SERVICE    Date of Clinic Visit:  3/29/2023    Jody Bullock is a 79 y.o. male who presents to clinic today for anticoagulation monitoring and adjustment. Recent INR Results:  Internal QC passed  Lab Results   Component Value Date    INR 2.5 2023    INR 3 2023       Current Warfarin Dosage:  Dosing Plan  As of 3/29/2023      TTR:  84.8 % (2.4 y)   Full warfarin instructions:  5 mg every Mon; 7.5 mg all other days; Starting 3/29/2023                 Assessment/Plan:    Continue current regimen as INR remains stable. Next Clinic Appointment:  Return date  As of 3/29/2023      TTR:  84.8 % (2.4 y)   Next INR check:  2023               Please call [facility] Anticoagulation Clinic at [phone] with any questions. Thanks!   Erinn Garcia [facility]  Anticoagulation Service Pharmacist  3/29/2023 8:06 AM    For Pharmacy Admin Tracking Only    Intervention Detail: Adherence Monitorin  Total # of Interventions Recommended: 0  Total # of Interventions Accepted: 0  Time Spent (min): 15

## 2023-04-05 ENCOUNTER — HOSPITAL ENCOUNTER (OUTPATIENT)
Dept: CARDIAC REHAB | Age: 68
Discharge: HOME | End: 2023-04-05

## 2023-04-05 PROCEDURE — 9900000065 HC CARDIAC REHAB PHASE 3 - 1 VISIT

## 2023-04-19 ENCOUNTER — HOSPITAL ENCOUNTER (OUTPATIENT)
Dept: CARDIAC REHAB | Age: 68
Discharge: HOME | End: 2023-04-19

## 2023-04-19 PROCEDURE — 9900000065 HC CARDIAC REHAB PHASE 3 - 1 VISIT

## 2023-04-26 ENCOUNTER — HOSPITAL ENCOUNTER (OUTPATIENT)
Dept: PHARMACY | Age: 68
Setting detail: THERAPIES SERIES
Discharge: HOME | End: 2023-04-26
Payer: MEDICARE

## 2023-04-26 ENCOUNTER — HOSPITAL ENCOUNTER (OUTPATIENT)
Dept: CARDIAC REHAB | Age: 68
Discharge: HOME | End: 2023-04-26

## 2023-04-26 DIAGNOSIS — I51.3 LEFT VENTRICULAR THROMBUS: Primary | ICD-10-CM

## 2023-04-26 LAB — INR BLD: 3.2

## 2023-04-26 PROCEDURE — 85610 PROTHROMBIN TIME: CPT

## 2023-04-26 PROCEDURE — 36416 COLLJ CAPILLARY BLOOD SPEC: CPT

## 2023-04-26 PROCEDURE — 99211 OFF/OP EST MAY X REQ PHY/QHP: CPT

## 2023-04-26 NOTE — PROGRESS NOTES
ANTICOAGULATION SERVICE    Date of Clinic Visit:  2023    Zaida Alexis is a 79 y.o. male who presents to clinic today for anticoagulation monitoring and adjustment. Recent INR Results:  Internal QC passed  Lab Results   Component Value Date    INR 3.2 2023    INR 2.5 2023       Current Warfarin Dosage:  Dosing Plan  As of 2023      TTR:  84.4 % (2.5 y)   Full warfarin instructions:  : 5 mg; Otherwise 5 mg every Mon; 7.5 mg all other days                 Assessment/Plan:    Continue current regimen as INR remains stable. Recheck 4 weeks. Next Clinic Appointment:  Return date  As of 2023      TTR:  84.4 % (2.5 y)   Next INR check:  2023               Please call [facility] Anticoagulation Clinic at [phone] with any questions. Thanks!   Tegan Silver [facility]  Anticoagulation Service Pharmacist  2023 8:13 AM    For Pharmacy Admin Tracking Only    Intervention Detail: Adherence Monitorin  Total # of Interventions Recommended: 0  Total # of Interventions Accepted: 0  Time Spent (min): 15

## 2023-05-01 ENCOUNTER — HOSPITAL ENCOUNTER (OUTPATIENT)
Age: 68
Discharge: HOME | End: 2023-05-01
Payer: MEDICARE

## 2023-05-01 DIAGNOSIS — E78.49 OTHER HYPERLIPIDEMIA: ICD-10-CM

## 2023-05-01 DIAGNOSIS — Z12.5 SPECIAL SCREENING FOR MALIGNANT NEOPLASM OF PROSTATE: ICD-10-CM

## 2023-05-01 DIAGNOSIS — E78.49 OTHER HYPERLIPIDEMIA: Primary | ICD-10-CM

## 2023-05-01 LAB
ALBUMIN SERPL-MCNC: 4 G/DL (ref 3.5–5.2)
ALBUMIN/GLOBULIN RATIO: 1.5 (ref 1–2.5)
ALP SERPL-CCNC: 91 U/L (ref 40–129)
ALT SERPL-CCNC: 23 U/L (ref 5–41)
ANION GAP SERPL CALCULATED.3IONS-SCNC: 8 MMOL/L (ref 9–17)
AST SERPL-CCNC: 23 U/L
BILIRUB SERPL-MCNC: 0.5 MG/DL (ref 0.3–1.2)
BUN SERPL-MCNC: 22 MG/DL (ref 8–23)
BUN/CREAT BLD: 20 (ref 9–20)
CALCIUM SERPL-MCNC: 9.5 MG/DL (ref 8.6–10.4)
CHLORIDE SERPL-SCNC: 103 MMOL/L (ref 98–107)
CHOLEST SERPL-MCNC: 133 MG/DL
CHOLESTEROL/HDL RATIO: 3.5
CO2 SERPL-SCNC: 30 MMOL/L (ref 20–31)
CREAT SERPL-MCNC: 1.12 MG/DL (ref 0.7–1.2)
GFR SERPL CREATININE-BSD FRML MDRD: >60 ML/MIN/1.73M2
GLUCOSE SERPL-MCNC: 93 MG/DL (ref 70–99)
HDLC SERPL-MCNC: 38 MG/DL
LDLC SERPL CALC-MCNC: 70 MG/DL (ref 0–130)
POTASSIUM SERPL-SCNC: 4.8 MMOL/L (ref 3.7–5.3)
PROSTATE SPECIFIC ANTIGEN: 2.42 NG/ML
PROT SERPL-MCNC: 6.7 G/DL (ref 6.4–8.3)
SODIUM SERPL-SCNC: 141 MMOL/L (ref 135–144)
TRIGL SERPL-MCNC: 127 MG/DL

## 2023-05-01 PROCEDURE — 80053 COMPREHEN METABOLIC PANEL: CPT

## 2023-05-01 PROCEDURE — 80061 LIPID PANEL: CPT

## 2023-05-01 PROCEDURE — G0103 PSA SCREENING: HCPCS

## 2023-05-01 PROCEDURE — 36415 COLL VENOUS BLD VENIPUNCTURE: CPT

## 2023-05-02 ENCOUNTER — OFFICE VISIT (OUTPATIENT)
Dept: INTERNAL MEDICINE | Age: 68
End: 2023-05-02
Payer: MEDICARE

## 2023-05-02 VITALS
OXYGEN SATURATION: 93 % | HEART RATE: 64 BPM | BODY MASS INDEX: 26.31 KG/M2 | WEIGHT: 205 LBS | HEIGHT: 74 IN | SYSTOLIC BLOOD PRESSURE: 94 MMHG | DIASTOLIC BLOOD PRESSURE: 64 MMHG | RESPIRATION RATE: 16 BRPM

## 2023-05-02 DIAGNOSIS — I25.83 CORONARY ARTERY DISEASE DUE TO LIPID RICH PLAQUE: ICD-10-CM

## 2023-05-02 DIAGNOSIS — I50.22 CHRONIC SYSTOLIC (CONGESTIVE) HEART FAILURE (HCC): Primary | ICD-10-CM

## 2023-05-02 DIAGNOSIS — E78.49 OTHER HYPERLIPIDEMIA: ICD-10-CM

## 2023-05-02 DIAGNOSIS — I25.10 CORONARY ARTERY DISEASE DUE TO LIPID RICH PLAQUE: ICD-10-CM

## 2023-05-02 PROCEDURE — 3017F COLORECTAL CA SCREEN DOC REV: CPT | Performed by: INTERNAL MEDICINE

## 2023-05-02 PROCEDURE — 99212 OFFICE O/P EST SF 10 MIN: CPT | Performed by: INTERNAL MEDICINE

## 2023-05-02 PROCEDURE — 1123F ACP DISCUSS/DSCN MKR DOCD: CPT | Performed by: INTERNAL MEDICINE

## 2023-05-02 PROCEDURE — G8427 DOCREV CUR MEDS BY ELIG CLIN: HCPCS | Performed by: INTERNAL MEDICINE

## 2023-05-02 PROCEDURE — 99214 OFFICE O/P EST MOD 30 MIN: CPT | Performed by: INTERNAL MEDICINE

## 2023-05-02 PROCEDURE — 1036F TOBACCO NON-USER: CPT | Performed by: INTERNAL MEDICINE

## 2023-05-02 PROCEDURE — G8417 CALC BMI ABV UP PARAM F/U: HCPCS | Performed by: INTERNAL MEDICINE

## 2023-05-02 SDOH — ECONOMIC STABILITY: INCOME INSECURITY: HOW HARD IS IT FOR YOU TO PAY FOR THE VERY BASICS LIKE FOOD, HOUSING, MEDICAL CARE, AND HEATING?: NOT HARD AT ALL

## 2023-05-02 SDOH — ECONOMIC STABILITY: HOUSING INSECURITY
IN THE LAST 12 MONTHS, WAS THERE A TIME WHEN YOU DID NOT HAVE A STEADY PLACE TO SLEEP OR SLEPT IN A SHELTER (INCLUDING NOW)?: NO

## 2023-05-02 SDOH — ECONOMIC STABILITY: FOOD INSECURITY: WITHIN THE PAST 12 MONTHS, THE FOOD YOU BOUGHT JUST DIDN'T LAST AND YOU DIDN'T HAVE MONEY TO GET MORE.: NEVER TRUE

## 2023-05-02 SDOH — ECONOMIC STABILITY: FOOD INSECURITY: WITHIN THE PAST 12 MONTHS, YOU WORRIED THAT YOUR FOOD WOULD RUN OUT BEFORE YOU GOT MONEY TO BUY MORE.: NEVER TRUE

## 2023-05-02 ASSESSMENT — PATIENT HEALTH QUESTIONNAIRE - PHQ9
SUM OF ALL RESPONSES TO PHQ QUESTIONS 1-9: 0
SUM OF ALL RESPONSES TO PHQ QUESTIONS 1-9: 0
2. FEELING DOWN, DEPRESSED OR HOPELESS: 0
SUM OF ALL RESPONSES TO PHQ QUESTIONS 1-9: 0
SUM OF ALL RESPONSES TO PHQ QUESTIONS 1-9: 0
1. LITTLE INTEREST OR PLEASURE IN DOING THINGS: 0
SUM OF ALL RESPONSES TO PHQ9 QUESTIONS 1 & 2: 0

## 2023-05-02 ASSESSMENT — ENCOUNTER SYMPTOMS
SHORTNESS OF BREATH: 0
VOMITING: 0
NAUSEA: 0
BACK PAIN: 0
CONSTIPATION: 0
EYE PAIN: 0
DIARRHEA: 0
ABDOMINAL PAIN: 0
COUGH: 0
BLOOD IN STOOL: 0

## 2023-05-02 NOTE — PROGRESS NOTES
[place] [address]  Dept: [phone]  Dept Fax: [phone]  Loc: [phone]     Radha Dumas is a 79 y.o. male who presents today for his medical conditions/complaintsas noted below. Radha Dumas is c/o of   Chief Complaint   Patient presents with    Coronary Artery Disease     6 month    Hyperlipidemia    Congestive Heart Failure         HPI:     Coronary Artery Disease  Presents for follow-up visit. Pertinent negatives include no chest pain, chest pressure, dizziness, leg swelling, palpitations or shortness of breath. Risk factors include hyperlipidemia. His past medical history is significant for CHF. The symptoms have been stable. Compliance with diet is good. Compliance with exercise is good. Compliance with medications is good. Hyperlipidemia  This is a chronic problem. The current episode started more than 1 year ago. The problem is controlled. Recent lipid tests were reviewed and are variable. Pertinent negatives include no chest pain or shortness of breath. Congestive Heart Failure  Presents for follow-up visit. Pertinent negatives include no abdominal pain, chest pain, chest pressure, palpitations or shortness of breath. The symptoms have been stable. His past medical history is significant for CAD. Compliance with total regimen is %. Compliance with diet is %. Compliance with exercise is %. Compliance with medications is %.      No results found for: LABA1C         No results found for: LABMICR  LDL Cholesterol (mg/dL)   Date Value   05/01/2023 70   04/18/2022 67   09/21/2020 120         AST (U/L)   Date Value   05/01/2023 23     ALT (U/L)   Date Value   05/01/2023 23     BUN (mg/dL)   Date Value   05/01/2023 22     BP Readings from Last 3 Encounters:   05/02/23 94/64   10/31/22 96/68   08/02/22 (!) 98/56              Past Medical History:

## 2023-05-03 ENCOUNTER — HOSPITAL ENCOUNTER (OUTPATIENT)
Dept: CARDIAC REHAB | Age: 68
Discharge: HOME | End: 2023-05-03

## 2023-05-10 ENCOUNTER — HOSPITAL ENCOUNTER (OUTPATIENT)
Dept: CARDIAC REHAB | Age: 68
Discharge: HOME | End: 2023-05-10

## 2023-05-10 PROCEDURE — 9900000065 HC CARDIAC REHAB PHASE 3 - 1 VISIT

## 2023-05-17 ENCOUNTER — HOSPITAL ENCOUNTER (OUTPATIENT)
Dept: CARDIAC REHAB | Age: 68
Discharge: HOME | End: 2023-05-17

## 2023-05-17 PROCEDURE — 9900000065 HC CARDIAC REHAB PHASE 3 - 1 VISIT

## 2023-05-24 ENCOUNTER — HOSPITAL ENCOUNTER (OUTPATIENT)
Dept: PHARMACY | Age: 68
Setting detail: THERAPIES SERIES
Discharge: HOME | End: 2023-05-24
Payer: MEDICARE

## 2023-05-24 ENCOUNTER — HOSPITAL ENCOUNTER (OUTPATIENT)
Dept: CARDIAC REHAB | Age: 68
Discharge: HOME | End: 2023-05-24

## 2023-05-24 DIAGNOSIS — I51.3 LEFT VENTRICULAR THROMBUS: Primary | ICD-10-CM

## 2023-05-24 LAB — INR BLD: 2.5

## 2023-05-24 PROCEDURE — 99211 OFF/OP EST MAY X REQ PHY/QHP: CPT

## 2023-05-24 PROCEDURE — 36416 COLLJ CAPILLARY BLOOD SPEC: CPT

## 2023-05-24 PROCEDURE — 85610 PROTHROMBIN TIME: CPT

## 2023-05-24 NOTE — PROGRESS NOTES
ANTICOAGULATION SERVICE    Date of Clinic Visit:  2023    Sukumar Dooley is a 79 y.o. male who presents to clinic today for anticoagulation monitoring and adjustment. Recent INR Results:  Internal QC passed  Lab Results   Component Value Date    INR 2.5 2023    INR 3.2 2023       Current Warfarin Dosage:  Dosing Plan  As of 2023      TTR:  84.0 % (2.6 y)   Full warfarin instructions:  5 mg every Mon; 7.5 mg all other days                 Assessment/Plan:    Continue current regimen as INR remains stable. Recheck 4 weeks. Next Clinic Appointment:  Return date  As of 2023      TTR:  84.0 % (2.6 y)   Next INR check:  2023               Please call [facility] Anticoagulation Clinic at [phone] with any questions. Thanks!   Marilyn Cohen [facility]  Anticoagulation Service Pharmacist  2023 8:16 AM    For Pharmacy Admin Tracking Only    Intervention Detail: Adherence Monitorin  Total # of Interventions Recommended: 0  Total # of Interventions Accepted: 0  Time Spent (min): 15

## 2023-05-31 ENCOUNTER — HOSPITAL ENCOUNTER (OUTPATIENT)
Dept: CARDIAC REHAB | Age: 68
Discharge: HOME | End: 2023-05-31

## 2023-06-07 ENCOUNTER — HOSPITAL ENCOUNTER (OUTPATIENT)
Dept: CARDIAC REHAB | Age: 68
Discharge: HOME | End: 2023-06-07

## 2023-06-07 PROCEDURE — 9900000065 HC CARDIAC REHAB PHASE 3 - 1 VISIT

## 2023-06-21 ENCOUNTER — HOSPITAL ENCOUNTER (OUTPATIENT)
Dept: PHARMACY | Age: 68
Setting detail: THERAPIES SERIES
Discharge: HOME | End: 2023-06-21
Payer: MEDICARE

## 2023-06-21 ENCOUNTER — HOSPITAL ENCOUNTER (OUTPATIENT)
Dept: CARDIAC REHAB | Age: 68
Discharge: HOME | End: 2023-06-21

## 2023-06-21 DIAGNOSIS — I51.3 LEFT VENTRICULAR THROMBUS: Primary | ICD-10-CM

## 2023-06-21 LAB
INR BLD: 2.9
PROTIME: 34.9 SECONDS

## 2023-06-21 PROCEDURE — 85610 PROTHROMBIN TIME: CPT

## 2023-06-21 PROCEDURE — 99211 OFF/OP EST MAY X REQ PHY/QHP: CPT

## 2023-06-21 PROCEDURE — 9900000065 HC CARDIAC REHAB PHASE 3 - 1 VISIT

## 2023-06-21 PROCEDURE — 36416 COLLJ CAPILLARY BLOOD SPEC: CPT

## 2023-06-21 NOTE — PROGRESS NOTES
ANTICOAGULATION SERVICE    Date of Clinic Visit:  6/21/2023    Jeff Olivares is a 79 y.o. male who presents to clinic today for anticoagulation monitoring and adjustment. Recent INR Results:  Internal QC passed  Lab Results   Component Value Date    INR 2.9 06/21/2023    INR 2.5 05/24/2023       Current Warfarin Dosage:  Dosing Plan  As of 6/21/2023      TTR:  84.5 % (2.6 y)   Full warfarin instructions:  5 mg every Mon; 7.5 mg all other days                 Assessment/Plan:    Continue current regimen as INR remains stable. Next Clinic Appointment:  Return date  As of 6/21/2023      TTR:  84.5 % (2.6 y)   Next INR check:  7/19/2023               Please call [facility] Anticoagulation Clinic at [phone] with any questions. Thanks!   Francia De La Rosa [facility]  Anticoagulation Service Pharmacist  6/21/2023 8:18 AM  For Pharmacy Admin Tracking Only    Intervention Detail: Dose Adjustment: 0, reason: Patient Preference  Total # of Interventions Recommended: 0  Total # of Interventions Accepted: 0  Time Spent (min): 15

## 2023-07-12 ENCOUNTER — HOSPITAL ENCOUNTER (OUTPATIENT)
Dept: CARDIAC REHAB | Age: 68
Discharge: HOME | End: 2023-07-12

## 2023-07-19 ENCOUNTER — HOSPITAL ENCOUNTER (OUTPATIENT)
Dept: CARDIAC REHAB | Age: 68
Discharge: HOME | End: 2023-07-19

## 2023-07-19 ENCOUNTER — HOSPITAL ENCOUNTER (OUTPATIENT)
Dept: PHARMACY | Age: 68
Setting detail: THERAPIES SERIES
Discharge: HOME | End: 2023-07-19
Payer: MEDICARE

## 2023-07-19 DIAGNOSIS — I51.3 LEFT VENTRICULAR THROMBUS: Primary | ICD-10-CM

## 2023-07-19 LAB
INR BLD: 3.4
PROTIME: 40.2 SECONDS

## 2023-07-19 PROCEDURE — 85610 PROTHROMBIN TIME: CPT

## 2023-07-19 PROCEDURE — 99212 OFFICE O/P EST SF 10 MIN: CPT

## 2023-07-19 PROCEDURE — 36416 COLLJ CAPILLARY BLOOD SPEC: CPT

## 2023-07-19 NOTE — PROGRESS NOTES
ANTICOAGULATION SERVICE    Date of Clinic Visit:  7/19/2023    Bella Steevnson is a 79 y.o. male who presents to clinic today for anticoagulation monitoring and adjustment. Recent INR Results:  Internal QC passed  Lab Results   Component Value Date    INR 3.4 07/19/2023    INR 2.9 06/21/2023       Dosing Plan  As of 7/19/2023      TTR:  82.7 % (2.7 y)   Full warfarin instructions:  7/19: 5 mg; 7/20: 5 mg; Otherwise 5 mg every Mon; 7.5 mg all other days                 Assessment/Plan:    Modify warfarin dose as noted above: Patient is above target will reduce dose down next 2 days and re-check in 1-2 weeks depending on appt schedule. Next Clinic Appointment:  Return date  As of 7/19/2023      TTR:  82.7 % (2.7 y)   Next INR check:  8/2/2023               Please call [facility] Anticoagulation Clinic at [phone] with any questions. Thanks!   Luke Silva [facility]  Anticoagulation Service Pharmacist  7/19/2023 8:10 AM  For Pharmacy Admin Tracking Only    Intervention Detail: Dose Adjustment: 1, reason: Therapy De-escalation  Total # of Interventions Recommended: 1  Total # of Interventions Accepted: 1  Time Spent (min): 15

## 2023-07-26 ENCOUNTER — HOSPITAL ENCOUNTER (OUTPATIENT)
Dept: CARDIAC REHAB | Age: 68
Discharge: HOME | End: 2023-07-26

## 2023-07-26 ENCOUNTER — HOSPITAL ENCOUNTER (OUTPATIENT)
Dept: PHARMACY | Age: 68
Setting detail: THERAPIES SERIES
Discharge: HOME | End: 2023-07-26
Payer: MEDICARE

## 2023-07-26 DIAGNOSIS — I51.3 LEFT VENTRICULAR THROMBUS: Primary | ICD-10-CM

## 2023-07-26 LAB
INR BLD: 1.7
PROTIME: 20.7 SECONDS

## 2023-07-26 PROCEDURE — 36416 COLLJ CAPILLARY BLOOD SPEC: CPT

## 2023-07-26 PROCEDURE — 99211 OFF/OP EST MAY X REQ PHY/QHP: CPT

## 2023-07-26 PROCEDURE — 9900000065 HC CARDIAC REHAB PHASE 3 - 1 VISIT

## 2023-07-26 PROCEDURE — 85610 PROTHROMBIN TIME: CPT

## 2023-07-26 NOTE — PROGRESS NOTES
ANTICOAGULATION SERVICE    Date of Clinic Visit:  7/26/2023    Judy Riojas is a 79 y.o. male who presents to clinic today for anticoagulation monitoring and adjustment. Recent INR Results:  Internal QC passed  Lab Results   Component Value Date    INR 1.7 07/26/2023    INR 3.4 07/19/2023       Current Warfarin Dosage:  Dosing Plan  As of 7/26/2023      TTR:  82.5 % (2.7 y)   Full warfarin instructions:  7/26: 10 mg; Otherwise 5 mg every Mon; 7.5 mg all other days                 Assessment/Plan:    Modify warfarin dose as noted above: Patient is now below target will return to normal regimen and re-check 3 weeks. Next Clinic Appointment:  Return date  As of 7/26/2023      TTR:  82.5 % (2.7 y)   Next INR check:  8/16/2023               Please call [address] [place] [address] Anticoagulation Clinic at [phone] with any questions. Thanks!   Renu Mathias, [address] [place] [address]  Anticoagulation Service Pharmacist  7/26/2023 9:18 AM  For Pharmacy Admin Tracking Only    Intervention Detail: Dose Adjustment: 1, reason: Therapy Optimization  Total # of Interventions Recommended: 1  Total # of Interventions Accepted: 1  Time Spent (min): 15

## 2023-07-28 ENCOUNTER — PROCEDURE VISIT (OUTPATIENT)
Dept: CARDIOLOGY | Age: 68
End: 2023-07-28
Payer: MEDICARE

## 2023-07-28 DIAGNOSIS — I42.8 NONISCHEMIC CARDIOMYOPATHY (HCC): Primary | ICD-10-CM

## 2023-07-28 DIAGNOSIS — Z95.810 AICD (AUTOMATIC CARDIOVERTER/DEFIBRILLATOR) PRESENT: ICD-10-CM

## 2023-07-28 PROCEDURE — 93289 INTERROG DEVICE EVAL HEART: CPT | Performed by: INTERNAL MEDICINE

## 2023-08-09 ENCOUNTER — HOSPITAL ENCOUNTER (OUTPATIENT)
Dept: CARDIAC REHAB | Age: 68
Discharge: HOME | End: 2023-08-09

## 2023-08-16 ENCOUNTER — HOSPITAL ENCOUNTER (OUTPATIENT)
Dept: PHARMACY | Age: 68
Setting detail: THERAPIES SERIES
Discharge: HOME | End: 2023-08-16
Payer: MEDICARE

## 2023-08-16 ENCOUNTER — HOSPITAL ENCOUNTER (OUTPATIENT)
Dept: CARDIAC REHAB | Age: 68
Discharge: HOME | End: 2023-08-16

## 2023-08-16 DIAGNOSIS — I51.3 LEFT VENTRICULAR THROMBUS: Primary | ICD-10-CM

## 2023-08-16 LAB — INR BLD: 2.9

## 2023-08-16 PROCEDURE — 99211 OFF/OP EST MAY X REQ PHY/QHP: CPT

## 2023-08-16 PROCEDURE — 9900000065 HC CARDIAC REHAB PHASE 3 - 1 VISIT

## 2023-08-16 PROCEDURE — 36416 COLLJ CAPILLARY BLOOD SPEC: CPT

## 2023-08-16 PROCEDURE — 85610 PROTHROMBIN TIME: CPT

## 2023-08-16 NOTE — PROGRESS NOTES
ANTICOAGULATION SERVICE    Date of Clinic Visit:  8/16/2023    Stu Ly is a 79 y.o. male who presents to clinic today for anticoagulation monitoring and adjustment. Recent INR Results:  Internal QC passed  Lab Results   Component Value Date    INR 2.9 08/16/2023    INR 1.7 07/26/2023       Current Warfarin Dosage:  Dosing Plan  As of 8/16/2023      TTR:  82.3 % (2.8 y)   Full warfarin instructions:  5 mg every Mon; 7.5 mg all other days                 Assessment/Plan:    Continue current regimen as INR remains stable. INR is back in range today. Recheck 4 weeks. Next Clinic Appointment:  Return date  As of 8/16/2023      TTR:  82.3 % (2.8 y)   Next INR check:  9/13/2023               Please call [facility] Anticoagulation Clinic at [phone] with any questions. Thanks!   Bg Collazo, [address] [place] [address]  Anticoagulation Service Pharmacist  8/16/2023 8:10 AM

## 2023-08-23 ENCOUNTER — HOSPITAL ENCOUNTER (OUTPATIENT)
Dept: CARDIAC REHAB | Age: 68
Discharge: HOME | End: 2023-08-23

## 2023-08-30 ENCOUNTER — HOSPITAL ENCOUNTER (OUTPATIENT)
Dept: CARDIAC REHAB | Age: 68
Discharge: HOME | End: 2023-08-30

## 2023-09-13 ENCOUNTER — APPOINTMENT (OUTPATIENT)
Dept: PHARMACY | Age: 68
End: 2023-09-13
Payer: MEDICARE

## 2023-09-20 ENCOUNTER — HOSPITAL ENCOUNTER (OUTPATIENT)
Dept: PHARMACY | Age: 68
Setting detail: THERAPIES SERIES
Discharge: HOME | End: 2023-09-20
Payer: MEDICARE

## 2023-09-20 ENCOUNTER — HOSPITAL ENCOUNTER (OUTPATIENT)
Dept: CARDIAC REHAB | Age: 68
Discharge: HOME | End: 2023-09-20

## 2023-09-20 DIAGNOSIS — I51.3 LEFT VENTRICULAR THROMBUS: Primary | ICD-10-CM

## 2023-09-20 LAB
INR BLD: 4.2
PROTIME: 50.6 SECONDS

## 2023-09-20 PROCEDURE — 99212 OFFICE O/P EST SF 10 MIN: CPT

## 2023-09-20 PROCEDURE — 9900000065 HC CARDIAC REHAB PHASE 3 - 1 VISIT

## 2023-09-20 PROCEDURE — 85610 PROTHROMBIN TIME: CPT

## 2023-09-20 PROCEDURE — 36416 COLLJ CAPILLARY BLOOD SPEC: CPT

## 2023-09-20 NOTE — PROGRESS NOTES
ANTICOAGULATION SERVICE    Date of Clinic Visit:  9/20/2023    Mary Grace Rucker is a 79 y.o. male who presents to clinic today for anticoagulation monitoring and adjustment. Recent INR Results:  Internal QC passed  Lab Results   Component Value Date    INR 4.2 09/20/2023    INR 2.9 08/16/2023       Current Warfarin Dosage:  Dosing Plan  As of 9/20/2023      TTR:  79.9 % (2.9 y)   Full warfarin instructions:  9/20: Hold; 9/21: 5 mg; Otherwise 5 mg every Mon; 7.5 mg all other days                 Assessment/Plan:    Modify warfarin dose as noted above: Patient is well above target. Will hold x 1, reduce dose down and re-check 1 week. Next Clinic Appointment:  Return date  As of 9/20/2023      TTR:  79.9 % (2.9 y)   Next INR check:  9/27/2023               Please call [facility] Anticoagulation Clinic at [phone] with any questions. Thanks!   MILLIE Cooper [facility]  Anticoagulation Service Pharmacist  9/20/2023 8:07 AM  For Pharmacy Admin Tracking Only    Intervention Detail: Dose Adjustment: 1, reason: Therapy De-escalation  Total # of Interventions Recommended: 1  Total # of Interventions Accepted: 1  Time Spent (min): 20

## 2023-09-27 ENCOUNTER — APPOINTMENT (OUTPATIENT)
Dept: PHARMACY | Age: 68
End: 2023-09-27
Payer: MEDICARE

## 2023-09-27 DIAGNOSIS — I51.3 LEFT VENTRICULAR THROMBUS: Primary | ICD-10-CM

## 2023-09-27 LAB — INR BLD: 3.3

## 2023-09-27 PROCEDURE — 99212 OFFICE O/P EST SF 10 MIN: CPT

## 2023-09-27 PROCEDURE — 85610 PROTHROMBIN TIME: CPT

## 2023-09-27 PROCEDURE — 36416 COLLJ CAPILLARY BLOOD SPEC: CPT

## 2023-09-27 NOTE — PROGRESS NOTES
ANTICOAGULATION SERVICE    Date of Clinic Visit:  9/27/2023    Missy Waldrop is a 79 y.o. male who presents to clinic today for anticoagulation monitoring and adjustment. Recent INR Results:  Internal QC passed  Lab Results   Component Value Date    INR 3.3 09/27/2023    INR 4.2 09/20/2023       Current Warfarin Dosage:  Dosing Plan  As of 9/27/2023      TTR:  79.3 % (2.9 y)   Full warfarin instructions:  5 mg every Mon, Wed, Fri; 7.5 mg all other days                 Assessment/Plan:    Modify warfarin dose as noted above: INR remains elevated today after holding x 1 dose and decreasing one day. I will decrease weekly dose 10% today and recheck in two weeks. Unsure why increase in INR but I believe if Marli Kay continues on current dose, his INR will increase back up. Next Clinic Appointment:  Return date  As of 9/27/2023      TTR:  79.3 % (2.9 y)   Next INR check:  10/11/2023               Please call [facility] Anticoagulation Clinic at [phone] with any questions. Thanks!   MILLIE Banda [facility]  Anticoagulation Service Pharmacist  9/27/2023 8:05 AM    For Pharmacy Admin Tracking Only    Intervention Detail: Dose Adjustment: 1, reason: Therapy Optimization  Total # of Interventions Recommended: 1  Total # of Interventions Accepted: 1  Time Spent (min): 15

## 2023-10-04 ENCOUNTER — HOSPITAL ENCOUNTER (OUTPATIENT)
Dept: CARDIAC REHAB | Age: 68
Discharge: HOME | End: 2023-10-04

## 2023-10-04 PROCEDURE — 9900000065 HC CARDIAC REHAB PHASE 3 - 1 VISIT

## 2023-10-18 ENCOUNTER — HOSPITAL ENCOUNTER (OUTPATIENT)
Dept: PHARMACY | Age: 68
Setting detail: THERAPIES SERIES
Discharge: HOME | End: 2023-10-18
Payer: MEDICARE

## 2023-10-18 ENCOUNTER — HOSPITAL ENCOUNTER (OUTPATIENT)
Dept: CARDIAC REHAB | Age: 68
Discharge: HOME | End: 2023-10-18

## 2023-10-18 DIAGNOSIS — I51.3 LEFT VENTRICULAR THROMBUS: Primary | ICD-10-CM

## 2023-10-18 LAB
INR BLD: 3
PROTIME: 35.7 SECONDS

## 2023-10-18 PROCEDURE — 36416 COLLJ CAPILLARY BLOOD SPEC: CPT

## 2023-10-18 PROCEDURE — 9900000065 HC CARDIAC REHAB PHASE 3 - 1 VISIT

## 2023-10-18 PROCEDURE — 85610 PROTHROMBIN TIME: CPT

## 2023-10-18 PROCEDURE — 99211 OFF/OP EST MAY X REQ PHY/QHP: CPT

## 2023-10-18 NOTE — PROGRESS NOTES
ANTICOAGULATION SERVICE    Date of Clinic Visit:  10/18/2023    Shaun Veras is a 79 y.o. male who presents to clinic today for anticoagulation monitoring and adjustment. Recent INR Results:  Internal QC passed  Lab Results   Component Value Date    INR 3 10/18/2023    INR 3.3 09/27/2023       Current Warfarin Dosage:  Dosing Plan  As of 10/18/2023      TTR:  77.8 % (3 y)   Full warfarin instructions:  7.5 mg every Tue, Thu, Sat; 5 mg all other days                 Assessment/Plan:    Modify warfarin dose as noted above: Patient is at top of the range. Will reduce dose down 5.6% and recheck as normal.    Next Clinic Appointment:  Return date  As of 10/18/2023      TTR:  77.8 % (3 y)   Next INR check:  11/15/2023               Please call [facility] Anticoagulation Clinic at [phone] with any questions. Thanks!   Nir Terrazas, [facility]  Anticoagulation Service Pharmacist  10/18/2023 8:07 AM  For Pharmacy Admin Tracking Only    Intervention Detail: Dose Adjustment: 1, reason: Therapy De-escalation  Total # of Interventions Recommended: 1  Total # of Interventions Accepted: 1  Time Spent (min): 20

## 2023-10-19 RX ORDER — SACUBITRIL AND VALSARTAN 24; 26 MG/1; MG/1
1 TABLET, FILM COATED ORAL 2 TIMES DAILY
Qty: 180 TABLET | Refills: 3 | Status: SHIPPED | OUTPATIENT
Start: 2023-10-19

## 2023-10-25 ENCOUNTER — HOSPITAL ENCOUNTER (OUTPATIENT)
Dept: CARDIAC REHAB | Age: 68
Discharge: HOME | End: 2023-10-25

## 2023-10-25 PROCEDURE — 9900000065 HC CARDIAC REHAB PHASE 3 - 1 VISIT

## 2023-11-01 ENCOUNTER — HOSPITAL ENCOUNTER (OUTPATIENT)
Dept: CARDIAC REHAB | Age: 68
Discharge: HOME | End: 2023-11-01

## 2023-11-03 ENCOUNTER — OFFICE VISIT (OUTPATIENT)
Dept: INTERNAL MEDICINE | Age: 68
End: 2023-11-03
Payer: MEDICARE

## 2023-11-03 VITALS
HEIGHT: 74 IN | SYSTOLIC BLOOD PRESSURE: 98 MMHG | DIASTOLIC BLOOD PRESSURE: 60 MMHG | RESPIRATION RATE: 16 BRPM | BODY MASS INDEX: 26.31 KG/M2 | WEIGHT: 205 LBS | HEART RATE: 95 BPM | OXYGEN SATURATION: 94 %

## 2023-11-03 DIAGNOSIS — I25.83 CORONARY ARTERY DISEASE DUE TO LIPID RICH PLAQUE: ICD-10-CM

## 2023-11-03 DIAGNOSIS — I50.22 CHRONIC SYSTOLIC CONGESTIVE HEART FAILURE (HCC): ICD-10-CM

## 2023-11-03 DIAGNOSIS — Z00.00 GENERAL MEDICAL EXAM: Primary | ICD-10-CM

## 2023-11-03 DIAGNOSIS — Z12.5 SPECIAL SCREENING FOR MALIGNANT NEOPLASM OF PROSTATE: ICD-10-CM

## 2023-11-03 DIAGNOSIS — E78.49 OTHER HYPERLIPIDEMIA: ICD-10-CM

## 2023-11-03 DIAGNOSIS — Z00.00 MEDICARE ANNUAL WELLNESS VISIT, SUBSEQUENT: ICD-10-CM

## 2023-11-03 DIAGNOSIS — I25.10 CORONARY ARTERY DISEASE DUE TO LIPID RICH PLAQUE: ICD-10-CM

## 2023-11-03 PROCEDURE — 1036F TOBACCO NON-USER: CPT | Performed by: INTERNAL MEDICINE

## 2023-11-03 PROCEDURE — 1123F ACP DISCUSS/DSCN MKR DOCD: CPT | Performed by: INTERNAL MEDICINE

## 2023-11-03 PROCEDURE — 3017F COLORECTAL CA SCREEN DOC REV: CPT | Performed by: INTERNAL MEDICINE

## 2023-11-03 PROCEDURE — G8484 FLU IMMUNIZE NO ADMIN: HCPCS | Performed by: INTERNAL MEDICINE

## 2023-11-03 PROCEDURE — G8417 CALC BMI ABV UP PARAM F/U: HCPCS | Performed by: INTERNAL MEDICINE

## 2023-11-03 PROCEDURE — G8427 DOCREV CUR MEDS BY ELIG CLIN: HCPCS | Performed by: INTERNAL MEDICINE

## 2023-11-03 PROCEDURE — 99214 OFFICE O/P EST MOD 30 MIN: CPT | Performed by: INTERNAL MEDICINE

## 2023-11-03 PROCEDURE — G0439 PPPS, SUBSEQ VISIT: HCPCS | Performed by: INTERNAL MEDICINE

## 2023-11-03 PROCEDURE — 99212 OFFICE O/P EST SF 10 MIN: CPT | Performed by: INTERNAL MEDICINE

## 2023-11-03 ASSESSMENT — ENCOUNTER SYMPTOMS
DIARRHEA: 0
NAUSEA: 0
VOMITING: 0
CONSTIPATION: 0
BLOOD IN STOOL: 0
SHORTNESS OF BREATH: 0
ABDOMINAL PAIN: 0
EYE PAIN: 0
COUGH: 0
BACK PAIN: 0

## 2023-11-03 ASSESSMENT — PATIENT HEALTH QUESTIONNAIRE - PHQ9
2. FEELING DOWN, DEPRESSED OR HOPELESS: 0
SUM OF ALL RESPONSES TO PHQ9 QUESTIONS 1 & 2: 0
SUM OF ALL RESPONSES TO PHQ QUESTIONS 1-9: 0
1. LITTLE INTEREST OR PLEASURE IN DOING THINGS: 0
SUM OF ALL RESPONSES TO PHQ QUESTIONS 1-9: 0

## 2023-11-03 NOTE — PROGRESS NOTES
[address] [address]  Dept: [phone]  Dept Fax: [phone]  Loc: [phone]     Reema Sorto is a 76 y.o. male who presents today for his medical conditions/complaintsas noted below. Reema Sorto is c/o of   Chief Complaint   Patient presents with    Medicare AWV     6 month    Coronary Artery Disease    Hyperlipidemia    Congestive Heart Failure         HPI:     Coronary Artery Disease  Presents for follow-up visit. Pertinent negatives include no chest pain, chest pressure, dizziness, leg swelling, palpitations or shortness of breath. Risk factors include hyperlipidemia. His past medical history is significant for CHF. The symptoms have been stable. Compliance with diet is good. Compliance with exercise is good. Compliance with medications is good. Hyperlipidemia  This is a chronic problem. The current episode started more than 1 year ago. The problem is controlled. Recent lipid tests were reviewed and are variable. Pertinent negatives include no chest pain or shortness of breath. Congestive Heart Failure  Presents for follow-up visit. Pertinent negatives include no abdominal pain, chest pain, chest pressure, palpitations or shortness of breath. The symptoms have been stable. His past medical history is significant for CAD. Compliance with total regimen is %. Compliance with diet is %. Compliance with exercise is %. Compliance with medications is %. Other  This is a recurrent (4-General medical exam) problem. The current episode started today. The problem occurs intermittently. The problem has been waxing and waning. Pertinent negatives include no abdominal pain, arthralgias, chest pain, chills, coughing, fever, headaches, nausea, neck pain, numbness, rash, vomiting or weakness.        No results found for: \"LABA1C\"         No components found for:

## 2023-11-08 ENCOUNTER — HOSPITAL ENCOUNTER (OUTPATIENT)
Dept: CARDIAC REHAB | Age: 68
Setting detail: THERAPIES SERIES
Discharge: HOME | End: 2023-11-08
Payer: MEDICARE

## 2023-11-08 PROCEDURE — 9900000065 HC CARDIAC REHAB PHASE 3 - 1 VISIT

## 2023-11-15 ENCOUNTER — HOSPITAL ENCOUNTER (OUTPATIENT)
Dept: CARDIAC REHAB | Age: 68
Discharge: HOME | End: 2023-11-15

## 2023-11-15 ENCOUNTER — HOSPITAL ENCOUNTER (OUTPATIENT)
Dept: PHARMACY | Age: 68
Setting detail: THERAPIES SERIES
Discharge: HOME | End: 2023-11-15
Payer: MEDICARE

## 2023-11-15 DIAGNOSIS — I51.3 LEFT VENTRICULAR THROMBUS: Primary | ICD-10-CM

## 2023-11-15 LAB
INR BLD: 3.7
PROTIME: 44.8 SECONDS

## 2023-11-15 PROCEDURE — 36416 COLLJ CAPILLARY BLOOD SPEC: CPT

## 2023-11-15 PROCEDURE — 99212 OFFICE O/P EST SF 10 MIN: CPT

## 2023-11-15 PROCEDURE — 85610 PROTHROMBIN TIME: CPT

## 2023-11-15 NOTE — PROGRESS NOTES
ANTICOAGULATION SERVICE    Date of Clinic Visit:  11/15/2023    Jeffery Hunt is a 76 y.o. male who presents to clinic today for anticoagulation monitoring and adjustment. Recent INR Results:  Internal QC passed  Lab Results   Component Value Date    INR 3.7 11/15/2023    INR 3 10/18/2023       Dosing Plan  As of 11/15/2023      TTR:  75.9 % (3 y)   Full warfarin instructions:  7.5 mg every Sat; 5 mg all other days                 Assessment/Plan:    Modify warfarin dose as noted above:     Next Clinic Appointment:  Return date  As of 11/15/2023      TTR:  75.9 % (3 y)   Next INR check:  11/22/2023               Please call [facility]ticoagulation Clinic at [phone] with any questions. Thanks!   Jose Damon [facility]  Anticoagulation Service Pharmacist  11/15/2023 8:12 AM  For Pharmacy Admin Tracking Only    Intervention Detail: Dose Adjustment: 1, reason: Therapy De-escalation  Total # of Interventions Recommended: 1  Total # of Interventions Accepted: 1  Time Spent (min): 15

## 2023-11-22 ENCOUNTER — APPOINTMENT (OUTPATIENT)
Dept: PHARMACY | Age: 68
End: 2023-11-22
Payer: MEDICARE

## 2023-11-22 ENCOUNTER — HOSPITAL ENCOUNTER (OUTPATIENT)
Dept: CARDIAC REHAB | Age: 68
Discharge: HOME | End: 2023-11-22

## 2023-11-22 DIAGNOSIS — I51.3 LEFT VENTRICULAR THROMBUS: Primary | ICD-10-CM

## 2023-11-22 LAB — INR BLD: 3.3

## 2023-11-22 PROCEDURE — 99211 OFF/OP EST MAY X REQ PHY/QHP: CPT

## 2023-11-22 PROCEDURE — 9900000065 HC CARDIAC REHAB PHASE 3 - 1 VISIT

## 2023-11-22 PROCEDURE — 85610 PROTHROMBIN TIME: CPT

## 2023-11-22 PROCEDURE — 36416 COLLJ CAPILLARY BLOOD SPEC: CPT

## 2023-11-22 NOTE — PROGRESS NOTES
ANTICOAGULATION SERVICE    Date of Clinic Visit:  2023    Marcy Paul is a 76 y.o. male who presents to clinic today for anticoagulation monitoring and adjustment. Recent INR Results:  Internal QC passed  Lab Results   Component Value Date    INR 3.3 2023    INR 3.7 11/15/2023       Current Warfarin Dosage:  Dosing Plan  As of 2023      TTR:  75.4 % (3.1 y)   Full warfarin instructions:  7.5 mg every Sat; 5 mg all other days                 Assessment/Plan:    Continue current regimen as INR remains stable. INR is above range today but has decreased from last week after dose reduction. Even though INR is above range I will not adjust weekly dose at this time. INR should continue to decrease. Recheck two weeks. Next Clinic Appointment:  Return date  As of 2023      TTR:  75.4 % (3.1 y)   Next INR check:  2023               Please call [facility] Anticoagulation Clinic at [phone] with any questions. Thanks!   Evelyne [facility]  Anticoagulation Service Pharmacist  2023 8:22 AM    For Pharmacy Admin Tracking Only    Intervention Detail: Adherence Monitorin  Total # of Interventions Recommended: 0  Total # of Interventions Accepted: 0  Time Spent (min): 15

## 2023-11-29 ENCOUNTER — HOSPITAL ENCOUNTER (OUTPATIENT)
Dept: CARDIAC REHAB | Age: 68
Discharge: HOME | End: 2023-11-29

## 2023-12-06 ENCOUNTER — HOSPITAL ENCOUNTER (OUTPATIENT)
Dept: CARDIAC REHAB | Age: 68
Discharge: HOME | End: 2023-12-06

## 2023-12-06 ENCOUNTER — HOSPITAL ENCOUNTER (OUTPATIENT)
Dept: PHARMACY | Age: 68
Setting detail: THERAPIES SERIES
Discharge: HOME | End: 2023-12-06
Payer: MEDICARE

## 2023-12-06 DIAGNOSIS — I51.3 LEFT VENTRICULAR THROMBUS: Primary | ICD-10-CM

## 2023-12-06 LAB — INR BLD: 2.4

## 2023-12-06 PROCEDURE — 99211 OFF/OP EST MAY X REQ PHY/QHP: CPT

## 2023-12-06 PROCEDURE — 85610 PROTHROMBIN TIME: CPT

## 2023-12-06 PROCEDURE — 9900000065 HC CARDIAC REHAB PHASE 3 - 1 VISIT

## 2023-12-06 PROCEDURE — 36416 COLLJ CAPILLARY BLOOD SPEC: CPT

## 2023-12-06 NOTE — PROGRESS NOTES
ANTICOAGULATION SERVICE    Date of Clinic Visit:  2023    Debbie Vincent is a 76 y.o. male who presents to clinic today for anticoagulation monitoring and adjustment. Recent INR Results:  Internal QC passed  Lab Results   Component Value Date    INR 2.4 2023    INR 3.3 2023       Current Warfarin Dosage:  Dosing Plan  As of 2023      TTR:  75.2 % (3.1 y)   Full warfarin instructions:  7.5 mg every Sat; 5 mg all other days                 Assessment/Plan:    Continue current regimen as INR remains stable. INR is back in range today. Has been on new dose for 3 weeks and should be evened out. Recheck two weeks. Next Clinic Appointment:  Return date  As of 2023      TTR:  75.2 % (3.1 y)   Next INR check:  2023               Please call [facility] Anticoagulation Clinic at [phone] with any questions. Thanks!   Edita Rees [facility]  Anticoagulation Service Pharmacist  2023 8:12 AM    For Pharmacy Admin Tracking Only    Intervention Detail: Adherence Monitorin  Total # of Interventions Recommended: 0  Total # of Interventions Accepted: 0  Time Spent (min): 15

## 2023-12-13 ENCOUNTER — HOSPITAL ENCOUNTER (OUTPATIENT)
Dept: CARDIAC REHAB | Age: 68
Discharge: HOME | End: 2023-12-13

## 2023-12-15 ENCOUNTER — PROCEDURE VISIT (OUTPATIENT)
Dept: CARDIOLOGY | Age: 68
End: 2023-12-15
Payer: MEDICARE

## 2023-12-15 DIAGNOSIS — Z95.810 AICD (AUTOMATIC CARDIOVERTER/DEFIBRILLATOR) PRESENT: ICD-10-CM

## 2023-12-15 DIAGNOSIS — I42.8 NONISCHEMIC CARDIOMYOPATHY (HCC): Primary | ICD-10-CM

## 2023-12-15 PROCEDURE — 93289 INTERROG DEVICE EVAL HEART: CPT | Performed by: INTERNAL MEDICINE

## 2024-01-10 ENCOUNTER — HOSPITAL ENCOUNTER (OUTPATIENT)
Dept: CARDIAC REHAB | Age: 69
Discharge: HOME | End: 2024-01-10

## 2024-01-10 ENCOUNTER — HOSPITAL ENCOUNTER (OUTPATIENT)
Dept: PHARMACY | Age: 69
Setting detail: THERAPIES SERIES
Discharge: HOME | End: 2024-01-10
Payer: MEDICARE

## 2024-01-10 DIAGNOSIS — I51.3 LEFT VENTRICULAR THROMBUS: Primary | ICD-10-CM

## 2024-01-10 LAB
INR BLD: 3.2
PROTIME: 38.8 SECONDS

## 2024-01-10 PROCEDURE — 36416 COLLJ CAPILLARY BLOOD SPEC: CPT

## 2024-01-10 PROCEDURE — 85610 PROTHROMBIN TIME: CPT

## 2024-01-10 PROCEDURE — 9900000065 HC CARDIAC REHAB PHASE 3 - 1 VISIT

## 2024-01-10 PROCEDURE — 99211 OFF/OP EST MAY X REQ PHY/QHP: CPT

## 2024-01-10 NOTE — PROGRESS NOTES
ANTICOAGULATION SERVICE    Date of Clinic Visit:  1/10/2024    Javier Bean is a 68 y.o. male who presents to clinic today for anticoagulation monitoring and adjustment.    Recent INR Results:  Internal QC passed  Lab Results   Component Value Date    INR 3.2 01/10/2024    INR 2.1 12/20/2023       Current Warfarin Dosage:  Dosing Plan  As of 1/10/2024      TTR:  75.7 % (3.2 y)   Full warfarin instructions:  1/10: 2.5 mg; Otherwise 7.5 mg every Sat; 5 mg all other days                 Assessment/Plan:    Modify warfarin dose as noted above: Patient INR is just above target.  Will reduce today's dose and re-check 3 weeks.  Patient was concerned that over holidays he had a bout of SOB took diuretic and it resolved. Told patient to contact physician to notify.     Next Clinic Appointment:  Return date  As of 1/10/2024      TTR:  75.7 % (3.2 y)   Next INR check:  1/31/2024               Please call [facility] Anticoagulation Clinic at [phone] with any questions.     Thanks!  Sari Moise, [facility]  Anticoagulation Service Pharmacist  1/10/2024 9:21 AM  For Pharmacy Admin Tracking Only    Intervention Detail: Dose Adjustment: 1, reason: Therapy De-escalation  Total # of Interventions Recommended: 1  Total # of Interventions Accepted: 1  Time Spent (min): 30

## 2024-01-17 ENCOUNTER — HOSPITAL ENCOUNTER (OUTPATIENT)
Dept: CARDIAC REHAB | Age: 69
Discharge: HOME | End: 2024-01-17

## 2024-01-17 PROCEDURE — 9900000065 HC CARDIAC REHAB PHASE 3 - 1 VISIT

## 2024-01-31 ENCOUNTER — APPOINTMENT (OUTPATIENT)
Dept: PHARMACY | Age: 69
End: 2024-01-31
Payer: MEDICARE

## 2024-01-31 ENCOUNTER — HOSPITAL ENCOUNTER (OUTPATIENT)
Dept: CARDIAC REHAB | Age: 69
Discharge: HOME | End: 2024-01-31

## 2024-01-31 DIAGNOSIS — I51.3 LEFT VENTRICULAR THROMBUS: Primary | ICD-10-CM

## 2024-01-31 LAB
INR BLD: 2.5
PROTIME: 29.8 SECONDS

## 2024-01-31 PROCEDURE — 9900000065 HC CARDIAC REHAB PHASE 3 - 1 VISIT

## 2024-01-31 PROCEDURE — 36416 COLLJ CAPILLARY BLOOD SPEC: CPT

## 2024-01-31 PROCEDURE — 85610 PROTHROMBIN TIME: CPT

## 2024-01-31 PROCEDURE — 99211 OFF/OP EST MAY X REQ PHY/QHP: CPT

## 2024-02-05 ENCOUNTER — OFFICE VISIT (OUTPATIENT)
Dept: INTERNAL MEDICINE | Age: 69
End: 2024-02-05
Payer: MEDICARE

## 2024-02-05 VITALS
RESPIRATION RATE: 16 BRPM | WEIGHT: 204 LBS | SYSTOLIC BLOOD PRESSURE: 110 MMHG | HEART RATE: 91 BPM | OXYGEN SATURATION: 97 % | DIASTOLIC BLOOD PRESSURE: 64 MMHG | HEIGHT: 74 IN | BODY MASS INDEX: 26.18 KG/M2

## 2024-02-05 DIAGNOSIS — E78.49 OTHER HYPERLIPIDEMIA: ICD-10-CM

## 2024-02-05 DIAGNOSIS — I50.22 CHRONIC SYSTOLIC CONGESTIVE HEART FAILURE (HCC): Primary | ICD-10-CM

## 2024-02-05 DIAGNOSIS — I25.10 CORONARY ARTERY DISEASE DUE TO LIPID RICH PLAQUE: ICD-10-CM

## 2024-02-05 DIAGNOSIS — I25.83 CORONARY ARTERY DISEASE DUE TO LIPID RICH PLAQUE: ICD-10-CM

## 2024-02-05 PROCEDURE — 3017F COLORECTAL CA SCREEN DOC REV: CPT | Performed by: INTERNAL MEDICINE

## 2024-02-05 PROCEDURE — 1123F ACP DISCUSS/DSCN MKR DOCD: CPT | Performed by: INTERNAL MEDICINE

## 2024-02-05 PROCEDURE — 1036F TOBACCO NON-USER: CPT | Performed by: INTERNAL MEDICINE

## 2024-02-05 PROCEDURE — G8417 CALC BMI ABV UP PARAM F/U: HCPCS | Performed by: INTERNAL MEDICINE

## 2024-02-05 PROCEDURE — G8427 DOCREV CUR MEDS BY ELIG CLIN: HCPCS | Performed by: INTERNAL MEDICINE

## 2024-02-05 PROCEDURE — G8484 FLU IMMUNIZE NO ADMIN: HCPCS | Performed by: INTERNAL MEDICINE

## 2024-02-05 PROCEDURE — 99214 OFFICE O/P EST MOD 30 MIN: CPT | Performed by: INTERNAL MEDICINE

## 2024-02-05 PROCEDURE — 99212 OFFICE O/P EST SF 10 MIN: CPT | Performed by: INTERNAL MEDICINE

## 2024-02-05 RX ORDER — FUROSEMIDE 20 MG/1
20 TABLET ORAL DAILY
Qty: 90 TABLET | Refills: 3 | Status: SHIPPED | OUTPATIENT
Start: 2024-02-05

## 2024-02-05 ASSESSMENT — ENCOUNTER SYMPTOMS
VOMITING: 0
CONSTIPATION: 0
DIARRHEA: 0
COUGH: 0
SHORTNESS OF BREATH: 0
BLOOD IN STOOL: 0
BACK PAIN: 0
NAUSEA: 0
EYE PAIN: 0
ABDOMINAL PAIN: 0

## 2024-02-05 ASSESSMENT — PATIENT HEALTH QUESTIONNAIRE - PHQ9
2. FEELING DOWN, DEPRESSED OR HOPELESS: 0
SUM OF ALL RESPONSES TO PHQ QUESTIONS 1-9: 0
SUM OF ALL RESPONSES TO PHQ9 QUESTIONS 1 & 2: 0

## 2024-02-05 NOTE — PROGRESS NOTES
Readings from Last 3 Encounters:   02/05/24 110/64   11/03/23 98/60   05/02/23 94/64              Past Medical History:   Diagnosis Date    Abnormal cardiovascular stress test 10/2020     Large inferior and inferoapical infarction, minimal faiza-infarct ischemia / Severely reduced LV systolic function.    Adenocarcinoma (HCC)     Lip.    ENAMORADO (dyspnea on exertion)     H/O echocardiogram 10/08/2020    ECHO 10/8/2020: EF 20%, grade I DD, mild TR, RVSP is 50 mmHg, moderate PHTN.     Hyperlipidemia       Past Surgical History:   Procedure Laterality Date    CARDIAC CATHETERIZATION  10/14/2020    CARDIAC DEFIBRILLATOR PLACEMENT Left 04/12/2021    COLONOSCOPY  10/27/2010    Showed multiple internal hemorrhoids.    COLONOSCOPY N/A 08/02/2022    mild sigmoid diverticulosis by Dr. Plummer at [facility]    MOUTH SURGERY  01/2007    Lip surgery.       Family History   Problem Relation Age of Onset    Heart Attack Father         Myocardial infarction in his 70s.    Diabetes Father     Glaucoma Mother           Social History     Tobacco Use    Smoking status: Never    Smokeless tobacco: Never   Substance Use Topics    Alcohol use: No         Current Outpatient Medications   Medication Sig Dispense Refill    furosemide (LASIX) 20 MG tablet Take 1 tablet by mouth daily 90 tablet 3    ENTRESTO 24-26 MG per tablet TAKE 1 TABLET BY MOUTH IN THE MORNING AND AT BEDTIME 180 tablet 3    metoprolol succinate (TOPROL XL) 25 MG extended release tablet TAKE 1/2 TABLET BY MOUTH EVERY NIGHT 45 tablet 3    warfarin (COUMADIN) 5 MG tablet Take 1 tablet by mouth daily 5 mg every mon, wed, fri; 7.5 mg all other days; Or as directed by INR results. 100 tablet 3    dapagliflozin (FARXIGA) 10 MG tablet Take 1 tablet by mouth every morning 90 tablet 2    atorvastatin (LIPITOR) 20 MG tablet TAKE 1 TABLET BY MOUTH DAILY 90 tablet 3     No current facility-administered medications for this visit.     Allergies   Allergen Reactions    Penicillins Other (See

## 2024-02-07 ENCOUNTER — HOSPITAL ENCOUNTER (OUTPATIENT)
Dept: CARDIAC REHAB | Age: 69
Discharge: HOME | End: 2024-02-07

## 2024-02-13 ENCOUNTER — HOSPITAL ENCOUNTER (OUTPATIENT)
Age: 69
Discharge: HOME | End: 2024-02-13
Payer: MEDICARE

## 2024-02-13 LAB
ALBUMIN SERPL-MCNC: 4.3 G/DL (ref 3.5–5.2)
ALBUMIN/GLOB SERPL: 1.3 {RATIO} (ref 1–2.5)
ALP SERPL-CCNC: 94 U/L (ref 40–129)
ALT SERPL-CCNC: 17 U/L (ref 5–41)
ANION GAP SERPL CALCULATED.3IONS-SCNC: 10 MMOL/L (ref 9–17)
AST SERPL-CCNC: 17 U/L
BILIRUB SERPL-MCNC: 0.8 MG/DL (ref 0.3–1.2)
BNP SERPL-MCNC: 9410 PG/ML
BUN SERPL-MCNC: 34 MG/DL (ref 8–23)
BUN/CREAT SERPL: 24 (ref 9–20)
CALCIUM SERPL-MCNC: 9.8 MG/DL (ref 8.6–10.4)
CHLORIDE SERPL-SCNC: 103 MMOL/L (ref 98–107)
CO2 SERPL-SCNC: 27 MMOL/L (ref 20–31)
CREAT SERPL-MCNC: 1.4 MG/DL (ref 0.7–1.2)
ERYTHROCYTE [DISTWIDTH] IN BLOOD BY AUTOMATED COUNT: 14 % (ref 11.8–14.4)
GFR SERPL CREATININE-BSD FRML MDRD: 55 ML/MIN/1.73M2
GLUCOSE SERPL-MCNC: 111 MG/DL (ref 70–99)
HCT VFR BLD AUTO: 46.6 % (ref 40.7–50.3)
HGB BLD-MCNC: 15.1 G/DL (ref 13–17)
INR PPP: 2.1
MCH RBC QN AUTO: 27.5 PG (ref 25.2–33.5)
MCHC RBC AUTO-ENTMCNC: 32.4 G/DL (ref 25.2–33.5)
MCV RBC AUTO: 84.9 FL (ref 82.6–102.9)
NRBC BLD-RTO: 0 PER 100 WBC
PLATELET # BLD AUTO: 268 K/UL (ref 138–453)
PMV BLD AUTO: 10.5 FL (ref 8.1–13.5)
POTASSIUM SERPL-SCNC: 5 MMOL/L (ref 3.7–5.3)
PROT SERPL-MCNC: 7.6 G/DL (ref 6.4–8.3)
PROTHROMBIN TIME: 23 SEC (ref 11.5–14.2)
RBC # BLD AUTO: 5.49 M/UL (ref 4.21–5.77)
SODIUM SERPL-SCNC: 140 MMOL/L (ref 135–144)
WBC OTHER # BLD: 8.3 K/UL (ref 3.5–11.3)

## 2024-02-13 PROCEDURE — 85610 PROTHROMBIN TIME: CPT

## 2024-02-13 PROCEDURE — 80053 COMPREHEN METABOLIC PANEL: CPT

## 2024-02-13 PROCEDURE — 85027 COMPLETE CBC AUTOMATED: CPT

## 2024-02-13 PROCEDURE — 83880 ASSAY OF NATRIURETIC PEPTIDE: CPT

## 2024-02-13 PROCEDURE — 36415 COLL VENOUS BLD VENIPUNCTURE: CPT

## 2024-02-14 ENCOUNTER — HOSPITAL ENCOUNTER (OUTPATIENT)
Dept: CARDIAC REHAB | Age: 69
Discharge: HOME | End: 2024-02-14

## 2024-02-14 ENCOUNTER — HOSPITAL ENCOUNTER (OUTPATIENT)
Dept: PHARMACY | Age: 69
Setting detail: THERAPIES SERIES
Discharge: HOME | End: 2024-02-14
Payer: MEDICARE

## 2024-02-14 DIAGNOSIS — I51.3 LEFT VENTRICULAR THROMBUS: Primary | ICD-10-CM

## 2024-02-14 LAB — INR BLD: 2.3

## 2024-02-14 PROCEDURE — 85610 PROTHROMBIN TIME: CPT

## 2024-02-14 PROCEDURE — 9900000065 HC CARDIAC REHAB PHASE 3 - 1 VISIT

## 2024-02-14 PROCEDURE — 36416 COLLJ CAPILLARY BLOOD SPEC: CPT

## 2024-02-14 PROCEDURE — 99211 OFF/OP EST MAY X REQ PHY/QHP: CPT

## 2024-02-14 NOTE — PROGRESS NOTES
ANTICOAGULATION SERVICE    Date of Clinic Visit:  2024    Javier Bean is a 68 y.o. male who presents to clinic today for anticoagulation monitoring and adjustment.    Recent INR Results:  Internal QC passed  Lab Results   Component Value Date    INR 2.3 2024    INR 2.1 2024       Current Warfarin Dosage:  Dosing Plan  As of 2024      TTR:  75.9 % (3.3 y)   Full warfarin instructions:  7.5 mg every Sat; 5 mg all other days                 Assessment/Plan:    Continue current regimen as INR remains stable.  INR remains in range today, down a little from last visit. Salvador has been on current dose for 8 weeks. I believe he should be at steady state by now and change very little. Salvador will be in [place] for 6 weeks. Will recheck when he returns.     Next Clinic Appointment:  Return date  As of 2024      TTR:  75.9 % (3.3 y)   Next INR check:  3/27/2024               Please call [facility] Anticoagulation Clinic at [phone] with any questions.     Thanks!  Kamaljit Flannery [facility]  Anticoagulation Service Pharmacist  2024 8:16 AM    For Pharmacy Admin Tracking Only    Intervention Detail: Adherence Monitorin  Total # of Interventions Recommended: 0  Total # of Interventions Accepted: 0  Time Spent (min): 15

## 2024-05-28 NOTE — PROGRESS NOTES
[place] [address]  Dept: [phone]  Dept Fax: [phone]  Loc: [phone]     Luis Cortez is a 79 y.o. male who presents today for his medical conditions/complaintsas noted below. Luis Cortez is c/o of   Chief Complaint   Patient presents with    Medicare AWV     6 month    Coronary Artery Disease    Hyperlipidemia    Congestive Heart Failure     systolic         HPI:     Coronary Artery Disease  Presents for follow-up visit. Pertinent negatives include no chest pain, chest pressure, dizziness, leg swelling, palpitations or shortness of breath. Risk factors include hyperlipidemia. His past medical history is significant for CHF. The symptoms have been stable. Compliance with diet is good. Compliance with exercise is good. Compliance with medications is good. Hyperlipidemia  This is a chronic problem. The current episode started more than 1 year ago. The problem is controlled. Recent lipid tests were reviewed and are variable. Pertinent negatives include no chest pain or shortness of breath. Congestive Heart Failure  Presents for follow-up visit. Pertinent negatives include no abdominal pain, chest pain, chest pressure, palpitations or shortness of breath. The symptoms have been stable. His past medical history is significant for CAD. Compliance with total regimen is %. Compliance with diet is %. Compliance with exercise is %. Compliance with medications is %. Other  This is a recurrent (4- general medical exam) problem. The current episode started today. The problem occurs intermittently. The problem has been waxing and waning. Pertinent negatives include no abdominal pain, arthralgias, chest pain, chills, coughing, fever, headaches, nausea, neck pain, numbness, rash, vomiting or weakness.      No results found for: LABA1C         No results found for: LABMICR  LDL Cholesterol (mg/dL)   Date Value   04/18/2022 67   09/21/2020 120   10/28/2019 117         AST (U/L)   Date Value   04/18/2022 21     ALT (U/L)   Date Value   04/18/2022 23     BUN (mg/dL)   Date Value   04/18/2022 24 (H)     BP Readings from Last 3 Encounters:   10/31/22 96/68   08/02/22 (!) 98/56   04/18/22 110/64              Past Medical History:   Diagnosis Date    Abnormal cardiovascular stress test 10/2020     Large inferior and inferoapical infarction, minimal faiza-infarct ischemia / Severely reduced LV systolic function. Adenocarcinoma (HCC)     Lip. ENAMORADO (dyspnea on exertion)     H/O echocardiogram 10/08/2020    ECHO 10/8/2020: EF 20%, grade I DD, mild TR, RVSP is 50 mmHg, moderate PHTN. Hyperlipidemia       Past Surgical History:   Procedure Laterality Date    CARDIAC CATHETERIZATION  10/14/2020    CARDIAC DEFIBRILLATOR PLACEMENT Left 04/12/2021    COLONOSCOPY  10/27/2010    Showed multiple internal hemorrhoids. COLONOSCOPY N/A 08/02/2022    mild sigmoid diverticulosis by Dr. Mayito Mendosa at [address]  01/2007    Lip surgery. Family History   Problem Relation Age of Onset    Heart Attack Father         Myocardial infarction in his 76s. Diabetes Father     Glaucoma Mother           Social History     Tobacco Use    Smoking status: Never    Smokeless tobacco: Never   Substance Use Topics    Alcohol use: No         Current Outpatient Medications   Medication Sig Dispense Refill    sacubitril-valsartan (ENTRESTO) 24-26 MG per tablet Take 1 tablet by mouth in the morning and 1 tablet before bedtime. 180 tablet 3    metoprolol succinate (TOPROL XL) 25 MG extended release tablet TAKE 1/2 TABLET BY MOUTH EVERY NIGHT 45 tablet 3    warfarin (COUMADIN) 5 MG tablet Take 1 tablet by mouth daily 5 mg every mon, wed, fri; 7.5 mg all other days; Or as directed by INR results.  100 tablet 3    dapagliflozin (FARXIGA) 10 MG tablet Take 1 tablet by mouth every morning 90 tablet 2    atorvastatin (LIPITOR) 20 MG tablet TAKE 1 TABLET BY MOUTH DAILY 90 tablet 3     No current facility-administered medications for this visit. Allergies   Allergen Reactions    Penicillins Other (See Comments)     As a child- unsure of reaction       Health Maintenance   Topic Date Due    Pneumococcal 65+ years Vaccine (1 - PCV) Never done    Hepatitis C screen  Never done    Shingles vaccine (1 of 2) Never done    COVID-19 Vaccine (3 - Booster for Moderna series) 05/31/2021    DTaP/Tdap/Td vaccine (2 - Td or Tdap) 05/19/2022    Depression Screen  07/08/2022    Flu vaccine (1) 08/01/2022    Lipids  04/18/2023    Annual Wellness Visit (AWV)  11/01/2023    Diabetes screen  04/18/2025    Colorectal Cancer Screen  08/02/2032    Hepatitis A vaccine  Aged Out    Hib vaccine  Aged Out    Meningococcal (ACWY) vaccine  Aged Out       Subjective:      Review of Systems   Constitutional:  Negative for chills and fever. HENT:  Negative for hearing loss. Eyes:  Negative for pain and visual disturbance. Respiratory:  Negative for cough and shortness of breath. Cardiovascular:  Negative for chest pain, palpitations and leg swelling. Gastrointestinal:  Negative for abdominal pain, blood in stool, constipation, diarrhea, nausea and vomiting. Endocrine: Negative for cold intolerance, polydipsia and polyuria. Genitourinary:  Negative for difficulty urinating, dysuria and hematuria. Musculoskeletal:  Negative for arthralgias, back pain, gait problem and neck pain. Skin:  Negative for pallor and rash. Neurological:  Negative for dizziness, weakness, numbness and headaches. Hematological:  Negative for adenopathy. Does not bruise/bleed easily. Psychiatric/Behavioral:  Negative for confusion. The patient is not nervous/anxious. Objective:     Physical Exam  Vitals reviewed. Constitutional:       Appearance: He is well-developed. HENT:      Head: Normocephalic and atraumatic.    Eyes: Pupils: Pupils are equal, round, and reactive to light. Cardiovascular:      Rate and Rhythm: Normal rate and regular rhythm. Heart sounds: No murmur heard. No friction rub. No gallop. Pulmonary:      Effort: Pulmonary effort is normal.      Breath sounds: Normal breath sounds. No wheezing or rales. Abdominal:      General: There is no distension. Palpations: Abdomen is soft. There is no mass. Tenderness: There is no abdominal tenderness. There is no rebound. Musculoskeletal:         General: Normal range of motion. Cervical back: Neck supple. Lymphadenopathy:      Cervical: No cervical adenopathy. Skin:     General: Skin is warm and dry. Findings: No rash. Neurological:      Mental Status: He is alert and oriented to person, place, and time. Cranial Nerves: No cranial nerve deficit (grossly). Psychiatric:         Thought Content: Thought content normal.      BP 96/68 (Site: Left Upper Arm, Position: Sitting, Cuff Size: Large Adult)   Pulse 58   Resp 16   Ht 6' 2\" (1.88 m)   Wt 204 lb (92.5 kg)   SpO2 97%   BMI 26.19 kg/m²     Assessment:       Diagnosis Orders   1. General medical exam  Comprehensive Metabolic Panel      2. Medicare annual wellness visit, subsequent        3. Other hyperlipidemia  Lipid Panel      4. Chronic systolic (congestive) heart failure        5. Coronary artery disease due to lipid rich plaque        6. Special screening for malignant neoplasm of prostate  PSA Screening      Reviewed multiple test results    4/18/2022 normal glucose 97    4/18/2022 normal total cholesterol 141    10/5/2022 okay INR 2.3    Patient told to continue Coumadin. Plan:       Return in about 27 weeks (around 5/8/2023) for Hyperlipidemia, CAD, CHF.     Orders Placed This Encounter   Procedures    Comprehensive Metabolic Panel     Standing Status:   Future     Standing Expiration Date:   10/31/2023    PSA Screening     Standing Status:   Future Standing Expiration Date:   10/31/2023    Lipid Panel     Standing Status:   Future     Standing Expiration Date:   10/31/2023     Order Specific Question:   Is Patient Fasting?/# of Hours     Answer:   15     Order Specific Question:   Has the patient fasted? Answer:   Yes       No orders of the defined types were placed in this encounter. Patientgiven educational materials - see patient instructions. Discussed use, benefit,and side effects of prescribed medications. All patient questions answered. Ptvoiced understanding. Reviewed health maintenance. Instructed to continue currentmedications, diet and exercise. Patient agreed with treatment plan. Follow up asdirected.      Electronically signed by Con Oh MD on 10/31/2022 at 10:09 AM 15

## 2024-07-09 ENCOUNTER — HOSPITAL ENCOUNTER (OUTPATIENT)
Dept: CARDIAC REHAB | Age: 69
Setting detail: THERAPIES SERIES
Discharge: HOME | End: 2024-07-09
Payer: MEDICARE

## 2024-07-09 VITALS — WEIGHT: 211 LBS | BODY MASS INDEX: 27.09 KG/M2

## 2024-07-09 PROCEDURE — G0422 INTENS CARDIAC REHAB W/EXERC: HCPCS

## 2024-07-09 ASSESSMENT — EXERCISE STRESS TEST
PEAK_HR: 87
PEAK_RPE: 12
PEAK_METS: 3.6

## 2024-07-10 ENCOUNTER — HOSPITAL ENCOUNTER (OUTPATIENT)
Dept: PHARMACY | Age: 69
Setting detail: THERAPIES SERIES
Discharge: HOME | End: 2024-07-10
Payer: MEDICARE

## 2024-07-10 ENCOUNTER — HOSPITAL ENCOUNTER (OUTPATIENT)
Dept: CARDIAC REHAB | Age: 69
Setting detail: THERAPIES SERIES
Discharge: HOME | End: 2024-07-10
Payer: MEDICARE

## 2024-07-10 VITALS — BODY MASS INDEX: 27.04 KG/M2 | WEIGHT: 210.6 LBS

## 2024-07-10 DIAGNOSIS — I51.3 LEFT VENTRICULAR THROMBUS: Primary | ICD-10-CM

## 2024-07-10 DIAGNOSIS — Z95.811 LEFT VENTRICULAR ASSIST DEVICE PRESENT (HCC): ICD-10-CM

## 2024-07-10 LAB — INR BLD: 2.6

## 2024-07-10 PROCEDURE — 85610 PROTHROMBIN TIME: CPT

## 2024-07-10 PROCEDURE — 36416 COLLJ CAPILLARY BLOOD SPEC: CPT

## 2024-07-10 PROCEDURE — 99211 OFF/OP EST MAY X REQ PHY/QHP: CPT

## 2024-07-10 PROCEDURE — G0422 INTENS CARDIAC REHAB W/EXERC: HCPCS

## 2024-07-10 RX ORDER — TAMSULOSIN HYDROCHLORIDE 0.4 MG/1
0.4 CAPSULE ORAL DAILY
COMMUNITY
Start: 2024-06-20

## 2024-07-10 RX ORDER — ONDANSETRON 4 MG/1
4 TABLET, ORALLY DISINTEGRATING ORAL EVERY 8 HOURS PRN
COMMUNITY
Start: 2024-05-03

## 2024-07-10 RX ORDER — TORSEMIDE 20 MG/1
40 TABLET ORAL DAILY
COMMUNITY
Start: 2024-03-13

## 2024-07-10 RX ORDER — WARFARIN SODIUM 5 MG/1
5 TABLET ORAL DAILY
COMMUNITY

## 2024-07-10 ASSESSMENT — EXERCISE STRESS TEST
PEAK_HR: 99
PEAK_RPE: 12
PEAK_METS: 4.4

## 2024-07-10 NOTE — PROGRESS NOTES
ANTICOAGULATION SERVICE    Date of Clinic Visit:  7/10/2024    Javier Bean is a 68 y.o. male who presents to clinic today for anticoagulation monitoring and adjustment.    Recent INR Results:  Internal QC passed  Lab Results   Component Value Date    INR 2.6 07/10/2024    INR 2.3 02/14/2024       Current Warfarin Dosage:    Dosing Plan  As of 7/10/2024      TTR:  78.5 % (3.7 y)   Full warfarin instructions:  5 mg every day                 Assessment/Plan:    Continue current regimen as INR remains stable. Patient is now back to us for INR monitoring as patient has had recent issue and was placed on LVAD pump.  Patient is still following with [facility] and will be checked in 2 weeks.  They will try to have results sent to us.  CC provided contact information for [facility] to send us results as well.  Re-check 2 weeks after [facility] appt on 7/23/24.    Next Clinic Appointment:  Return date  As of 7/10/2024      TTR:  78.5 % (3.7 y)   Next INR check:  8/7/2024               Please call [facility] Anticoagulation Clinic at [phone] with any questions.     Thanks!  Sari Moise [facility]  Anticoagulation Service Pharmacist  7/10/2024 9:22 AM  For Pharmacy Admin Tracking Only    Intervention Detail: Dose Adjustment: 0, reason: Patient Preference  Total # of Interventions Recommended: 0  Total # of Interventions Accepted: 0  Time Spent (min): 30

## 2024-07-17 ENCOUNTER — HOSPITAL ENCOUNTER (OUTPATIENT)
Dept: CARDIAC REHAB | Age: 69
Setting detail: THERAPIES SERIES
Discharge: HOME | End: 2024-07-17
Payer: MEDICARE

## 2024-07-17 VITALS — WEIGHT: 211 LBS | BODY MASS INDEX: 27.09 KG/M2

## 2024-07-17 PROCEDURE — G0422 INTENS CARDIAC REHAB W/EXERC: HCPCS

## 2024-07-17 ASSESSMENT — EXERCISE STRESS TEST
PEAK_HR: 110
PEAK_RPE: 12
PEAK_METS: 4.5

## 2024-07-19 ENCOUNTER — HOSPITAL ENCOUNTER (OUTPATIENT)
Dept: CARDIAC REHAB | Age: 69
Setting detail: THERAPIES SERIES
Discharge: HOME | End: 2024-07-19
Payer: MEDICARE

## 2024-07-19 VITALS — WEIGHT: 210 LBS | BODY MASS INDEX: 26.96 KG/M2

## 2024-07-19 PROCEDURE — G0422 INTENS CARDIAC REHAB W/EXERC: HCPCS

## 2024-07-19 ASSESSMENT — EXERCISE STRESS TEST
PEAK_HR: 100
PEAK_RPE: 12

## 2024-07-22 ENCOUNTER — HOSPITAL ENCOUNTER (OUTPATIENT)
Dept: CARDIAC REHAB | Age: 69
Setting detail: THERAPIES SERIES
Discharge: HOME | End: 2024-07-22
Payer: MEDICARE

## 2024-07-22 VITALS — WEIGHT: 211 LBS | BODY MASS INDEX: 27.09 KG/M2

## 2024-07-22 PROCEDURE — G0422 INTENS CARDIAC REHAB W/EXERC: HCPCS

## 2024-07-24 ENCOUNTER — HOSPITAL ENCOUNTER (OUTPATIENT)
Dept: CARDIAC REHAB | Age: 69
Setting detail: THERAPIES SERIES
Discharge: HOME | End: 2024-07-24
Payer: MEDICARE

## 2024-07-24 VITALS — BODY MASS INDEX: 27.35 KG/M2 | WEIGHT: 213 LBS

## 2024-07-24 PROCEDURE — G0422 INTENS CARDIAC REHAB W/EXERC: HCPCS

## 2024-07-26 ENCOUNTER — HOSPITAL ENCOUNTER (OUTPATIENT)
Dept: CARDIAC REHAB | Age: 69
Setting detail: THERAPIES SERIES
Discharge: HOME | End: 2024-07-26
Payer: MEDICARE

## 2024-07-26 ENCOUNTER — PROCEDURE VISIT (OUTPATIENT)
Dept: CARDIOLOGY | Age: 69
End: 2024-07-26
Payer: MEDICARE

## 2024-07-26 VITALS — WEIGHT: 211 LBS | BODY MASS INDEX: 27.09 KG/M2

## 2024-07-26 DIAGNOSIS — I42.8 NONISCHEMIC CARDIOMYOPATHY (HCC): Primary | ICD-10-CM

## 2024-07-26 DIAGNOSIS — Z95.810 ICD (IMPLANTABLE CARDIOVERTER-DEFIBRILLATOR) IN PLACE: ICD-10-CM

## 2024-07-26 PROCEDURE — 93289 INTERROG DEVICE EVAL HEART: CPT | Performed by: INTERNAL MEDICINE

## 2024-07-31 ENCOUNTER — HOSPITAL ENCOUNTER (OUTPATIENT)
Dept: CARDIAC REHAB | Age: 69
Setting detail: THERAPIES SERIES
Discharge: HOME | End: 2024-07-31
Payer: MEDICARE

## 2024-07-31 PROCEDURE — G0422 INTENS CARDIAC REHAB W/EXERC: HCPCS

## 2024-07-31 ASSESSMENT — EXERCISE STRESS TEST
PEAK_METS: 4.8
PEAK_RPE: 12
PEAK_HR: 98

## 2024-08-02 ENCOUNTER — HOSPITAL ENCOUNTER (OUTPATIENT)
Dept: CARDIAC REHAB | Age: 69
Setting detail: THERAPIES SERIES
Discharge: HOME | End: 2024-08-02
Payer: MEDICARE

## 2024-08-02 VITALS — BODY MASS INDEX: 27.09 KG/M2 | WEIGHT: 211 LBS

## 2024-08-02 PROCEDURE — G0422 INTENS CARDIAC REHAB W/EXERC: HCPCS

## 2024-08-02 ASSESSMENT — EXERCISE STRESS TEST
PEAK_HR: 101
PEAK_RPE: 12
PEAK_METS: 4.8

## 2024-08-05 ENCOUNTER — HOSPITAL ENCOUNTER (OUTPATIENT)
Dept: CARDIAC REHAB | Age: 69
Setting detail: THERAPIES SERIES
Discharge: HOME | End: 2024-08-05
Payer: MEDICARE

## 2024-08-05 VITALS — WEIGHT: 213 LBS | BODY MASS INDEX: 27.35 KG/M2

## 2024-08-05 PROCEDURE — G0422 INTENS CARDIAC REHAB W/EXERC: HCPCS

## 2024-08-05 PROCEDURE — G0423 INTENS CARDIAC REHAB NO EXER: HCPCS

## 2024-08-05 ASSESSMENT — EXERCISE STRESS TEST
PEAK_HR: 104
PEAK_RPE: 12
PEAK_METS: 4.8

## 2024-08-06 LAB — INR BLD: 2.2

## 2024-08-07 ENCOUNTER — HOSPITAL ENCOUNTER (OUTPATIENT)
Dept: CARDIAC REHAB | Age: 69
Setting detail: THERAPIES SERIES
Discharge: HOME | End: 2024-08-07
Payer: MEDICARE

## 2024-08-07 ENCOUNTER — ANTI-COAG VISIT (OUTPATIENT)
Age: 69
End: 2024-08-07

## 2024-08-07 VITALS — BODY MASS INDEX: 27.42 KG/M2 | WEIGHT: 213.6 LBS

## 2024-08-07 DIAGNOSIS — Z95.811 LEFT VENTRICULAR ASSIST DEVICE PRESENT (HCC): ICD-10-CM

## 2024-08-07 DIAGNOSIS — I51.3 LEFT VENTRICULAR THROMBUS: Primary | ICD-10-CM

## 2024-08-07 PROCEDURE — G0422 INTENS CARDIAC REHAB W/EXERC: HCPCS

## 2024-08-07 ASSESSMENT — EXERCISE STRESS TEST
PEAK_RPE: 12
PEAK_HR: 96
PEAK_METS: 4.8

## 2024-08-07 NOTE — PROGRESS NOTES
ANTICOAGULATION SERVICE    Date of Clinic Visit:  8/7/2024    Javier Baen is a 68 y.o. male who presents to clinic today, but, [facility] tulio INR yesterday.    Recent INR Results:  Internal QC passed  Lab Results   Component Value Date    INR 2.2 08/06/2024    INR 2.6 07/10/2024       Current Warfarin Dosage:  Dosing Plan  As of 8/7/2024      TTR:  78.9 % (3.8 y)   Full warfarin instructions:  5 mg every day                 Assessment/Plan:    Continue current regimen as INR remains stable. Patient has orders for INR and additional labs to be drawn every 2 weeks.  Patient will still come to [facility] to have INR drawn and dosed.  CC will forward visit summary to  [facility] care team via fax. Recheck 2 weeks as requested per [facility].   Next Clinic Appointment:  Return date  As of 8/7/2024      TTR:  78.9 % (3.8 y)   Next INR check:  8/21/2024               Please call [facility] Anticoagulation Clinic at [phone] with any questions.     Thanks!  Sari Moise [facility]  Anticoagulation Service Pharmacist  8/7/2024 9:36 AM  For Pharmacy Admin Tracking Only    Intervention Detail: Dose Adjustment: 0, reason: Patient Preference  Total # of Interventions Recommended: 0  Total # of Interventions Accepted: 0  Time Spent (min): 20

## 2024-08-09 ENCOUNTER — HOSPITAL ENCOUNTER (OUTPATIENT)
Dept: CARDIAC REHAB | Age: 69
Setting detail: THERAPIES SERIES
Discharge: HOME | End: 2024-08-09
Payer: MEDICARE

## 2024-08-09 VITALS — BODY MASS INDEX: 27.35 KG/M2 | WEIGHT: 213 LBS

## 2024-08-09 PROCEDURE — G0422 INTENS CARDIAC REHAB W/EXERC: HCPCS

## 2024-08-09 ASSESSMENT — EXERCISE STRESS TEST
PEAK_RPE: 12
PEAK_HR: 103
PEAK_METS: 5

## 2024-08-12 ENCOUNTER — HOSPITAL ENCOUNTER (OUTPATIENT)
Dept: CARDIAC REHAB | Age: 69
Setting detail: THERAPIES SERIES
Discharge: HOME | End: 2024-08-12
Payer: MEDICARE

## 2024-08-12 VITALS — WEIGHT: 214 LBS | BODY MASS INDEX: 27.48 KG/M2

## 2024-08-12 PROCEDURE — G0422 INTENS CARDIAC REHAB W/EXERC: HCPCS

## 2024-08-12 ASSESSMENT — EXERCISE STRESS TEST
PEAK_RPE: 12
PEAK_METS: 5
PEAK_HR: 98

## 2024-08-14 ENCOUNTER — HOSPITAL ENCOUNTER (OUTPATIENT)
Dept: CARDIAC REHAB | Age: 69
Setting detail: THERAPIES SERIES
Discharge: HOME | End: 2024-08-14
Payer: MEDICARE

## 2024-08-14 VITALS — BODY MASS INDEX: 27.6 KG/M2 | WEIGHT: 215 LBS

## 2024-08-14 PROCEDURE — G0422 INTENS CARDIAC REHAB W/EXERC: HCPCS

## 2024-08-14 ASSESSMENT — EXERCISE STRESS TEST
PEAK_RPE: 12
PEAK_HR: 79
PEAK_METS: 4.8

## 2024-08-16 ENCOUNTER — HOSPITAL ENCOUNTER (OUTPATIENT)
Dept: CARDIAC REHAB | Age: 69
Setting detail: THERAPIES SERIES
Discharge: HOME | End: 2024-08-16
Payer: MEDICARE

## 2024-08-16 VITALS — WEIGHT: 214 LBS | BODY MASS INDEX: 27.48 KG/M2

## 2024-08-16 ASSESSMENT — EXERCISE STRESS TEST
PEAK_RPE: 12
PEAK_HR: 105
PEAK_METS: 4.7

## 2024-08-19 ENCOUNTER — HOSPITAL ENCOUNTER (OUTPATIENT)
Dept: CARDIAC REHAB | Age: 69
Setting detail: THERAPIES SERIES
Discharge: HOME | End: 2024-08-19
Payer: MEDICARE

## 2024-08-19 VITALS — BODY MASS INDEX: 27.5 KG/M2 | WEIGHT: 214.2 LBS

## 2024-08-19 PROCEDURE — G0422 INTENS CARDIAC REHAB W/EXERC: HCPCS

## 2024-08-19 ASSESSMENT — EXERCISE STRESS TEST
PEAK_METS: 4.7
PEAK_RPE: 12
PEAK_HR: 100

## 2024-08-20 ASSESSMENT — EJECTION FRACTION: EF_VALUE: 15

## 2024-08-21 ENCOUNTER — TELEPHONE (OUTPATIENT)
Dept: INTERNAL MEDICINE | Age: 69
End: 2024-08-21

## 2024-08-21 ENCOUNTER — HOSPITAL ENCOUNTER (OUTPATIENT)
Dept: CARDIAC REHAB | Age: 69
Setting detail: THERAPIES SERIES
Discharge: HOME | End: 2024-08-21
Payer: MEDICARE

## 2024-08-21 ENCOUNTER — ANTI-COAG VISIT (OUTPATIENT)
Age: 69
End: 2024-08-21
Payer: MEDICARE

## 2024-08-21 ENCOUNTER — HOSPITAL ENCOUNTER (OUTPATIENT)
Age: 69
Discharge: HOME | End: 2024-08-21
Payer: MEDICARE

## 2024-08-21 VITALS — WEIGHT: 215.2 LBS | BODY MASS INDEX: 27.63 KG/M2

## 2024-08-21 DIAGNOSIS — Z95.811 LEFT VENTRICULAR ASSIST DEVICE PRESENT (HCC): ICD-10-CM

## 2024-08-21 DIAGNOSIS — I51.3 LEFT VENTRICULAR THROMBUS: Primary | ICD-10-CM

## 2024-08-21 LAB
ALBUMIN SERPL-MCNC: 4 G/DL (ref 3.5–5.2)
ALBUMIN/GLOB SERPL: 1.2 {RATIO} (ref 1–2.5)
ALP SERPL-CCNC: 117 U/L (ref 40–129)
ALT SERPL-CCNC: 22 U/L (ref 5–41)
ANION GAP SERPL CALCULATED.3IONS-SCNC: 8 MMOL/L (ref 9–17)
AST SERPL-CCNC: 25 U/L
BILIRUB SERPL-MCNC: 0.5 MG/DL (ref 0.3–1.2)
BNP SERPL-MCNC: 1325 PG/ML
BUN SERPL-MCNC: 31 MG/DL (ref 8–23)
BUN/CREAT SERPL: 24 (ref 9–20)
CALCIUM SERPL-MCNC: 9.3 MG/DL (ref 8.6–10.4)
CHLORIDE SERPL-SCNC: 106 MMOL/L (ref 98–107)
CO2 SERPL-SCNC: 27 MMOL/L (ref 20–31)
CREAT SERPL-MCNC: 1.3 MG/DL (ref 0.7–1.2)
ERYTHROCYTE [DISTWIDTH] IN BLOOD BY AUTOMATED COUNT: 14.7 % (ref 11.8–14.4)
GFR, ESTIMATED: 60 ML/MIN/1.73M2
GLUCOSE SERPL-MCNC: 130 MG/DL (ref 70–99)
HCT VFR BLD AUTO: 40.4 % (ref 40.7–50.3)
HGB BLD-MCNC: 13.2 G/DL (ref 13–17)
INR PPP: 2.4
INTERNATIONAL NORMALIZATION RATIO, POC: 2.4
LDH SERPL-CCNC: 194 U/L (ref 135–225)
MAGNESIUM SERPL-MCNC: 2.3 MG/DL (ref 1.6–2.6)
MCH RBC QN AUTO: 28.8 PG (ref 25.2–33.5)
MCHC RBC AUTO-ENTMCNC: 32.7 G/DL (ref 25.2–33.5)
MCV RBC AUTO: 88 FL (ref 82.6–102.9)
NRBC BLD-RTO: 0 PER 100 WBC
PLATELET # BLD AUTO: 227 K/UL (ref 138–453)
PMV BLD AUTO: 9.2 FL (ref 8.1–13.5)
POTASSIUM SERPL-SCNC: 5 MMOL/L (ref 3.7–5.3)
PROT SERPL-MCNC: 7.4 G/DL (ref 6.4–8.3)
PROTHROMBIN TIME, POC: 0
PROTHROMBIN TIME: 25.1 SEC (ref 11.5–14.2)
RBC # BLD AUTO: 4.59 M/UL (ref 4.21–5.77)
SODIUM SERPL-SCNC: 141 MMOL/L (ref 135–144)
WBC OTHER # BLD: 8.5 K/UL (ref 3.5–11.3)

## 2024-08-21 PROCEDURE — 85610 PROTHROMBIN TIME: CPT

## 2024-08-21 PROCEDURE — 85027 COMPLETE CBC AUTOMATED: CPT

## 2024-08-21 PROCEDURE — 99211 OFF/OP EST MAY X REQ PHY/QHP: CPT

## 2024-08-21 PROCEDURE — 83735 ASSAY OF MAGNESIUM: CPT

## 2024-08-21 PROCEDURE — 36415 COLL VENOUS BLD VENIPUNCTURE: CPT

## 2024-08-21 PROCEDURE — 83615 LACTATE (LD) (LDH) ENZYME: CPT

## 2024-08-21 PROCEDURE — 83880 ASSAY OF NATRIURETIC PEPTIDE: CPT

## 2024-08-21 PROCEDURE — G0422 INTENS CARDIAC REHAB W/EXERC: HCPCS

## 2024-08-21 PROCEDURE — 80053 COMPREHEN METABOLIC PANEL: CPT

## 2024-08-21 ASSESSMENT — EXERCISE STRESS TEST
PEAK_HR: 103
PEAK_RPE: 12
PEAK_METS: 4.8

## 2024-08-21 NOTE — PROGRESS NOTES
ANTICOAGULATION SERVICE    Date of Clinic Visit:  8/21/2024    Javier Bean is a 68 y.o. male who presents to clinic today for anticoagulation monitoring and adjustment.    Recent INR Results:  Internal QC passed  Lab Results   Component Value Date    INR 2.4 08/21/2024    INR 2.4 08/21/2024       Current Warfarin Dosage:  Dosing Plan  As of 8/21/2024      TTR:  79.2% (3.8 y)   Full warfarin instructions:  5 mg every day                 Assessment/Plan:    Continue current regimen as INR remains stable. Lab tulio INR value so we will use that for today's level and dosing.       Next Clinic Appointment:  Return date  As of 8/21/2024      TTR:  79.2% (3.8 y)   Next INR check:  9/18/2024               Please call [facility] Anticoagulation Clinic at [phone] with any questions.     Thanks!  Sari Moise [facility]  Anticoagulation Service Pharmacist  8/21/2024 9:02 AM  For Pharmacy Admin Tracking Only    Intervention Detail: Dose Adjustment: 0, reason: Patient Preference  Total # of Interventions Recommended: 0  Total # of Interventions Accepted: 0  Time Spent (min): 20

## 2024-08-21 NOTE — TELEPHONE ENCOUNTER
Sari Moise, [facility]  Abraham Mcallister MD  Cc: [facility]  Current referral for Med Mgmt treatment expires on 9/20/24  Please place a new referral to continue treatment for the patient.  Alexa Steele  8/21/24  4:16 PM

## 2024-08-22 DIAGNOSIS — I51.3 LEFT VENTRICULAR THROMBUS: Primary | ICD-10-CM

## 2024-08-22 DIAGNOSIS — Z95.811 LEFT VENTRICULAR ASSIST DEVICE PRESENT (HCC): ICD-10-CM

## 2024-08-23 ENCOUNTER — HOSPITAL ENCOUNTER (OUTPATIENT)
Dept: CARDIAC REHAB | Age: 69
Setting detail: THERAPIES SERIES
Discharge: HOME | End: 2024-08-23
Payer: MEDICARE

## 2024-08-23 VITALS — WEIGHT: 214.2 LBS | BODY MASS INDEX: 27.5 KG/M2

## 2024-08-23 PROCEDURE — G0422 INTENS CARDIAC REHAB W/EXERC: HCPCS

## 2024-08-23 ASSESSMENT — EXERCISE STRESS TEST
PEAK_HR: 104
PEAK_METS: 5
PEAK_RPE: 12

## 2024-08-26 ENCOUNTER — HOSPITAL ENCOUNTER (OUTPATIENT)
Dept: CARDIAC REHAB | Age: 69
Setting detail: THERAPIES SERIES
Discharge: HOME | End: 2024-08-26
Payer: MEDICARE

## 2024-08-26 VITALS — BODY MASS INDEX: 27.22 KG/M2 | WEIGHT: 212 LBS

## 2024-08-26 PROCEDURE — G0422 INTENS CARDIAC REHAB W/EXERC: HCPCS

## 2024-08-26 ASSESSMENT — EXERCISE STRESS TEST
PEAK_HR: 117
PEAK_METS: 4.7
PEAK_RPE: 12

## 2024-08-28 ENCOUNTER — HOSPITAL ENCOUNTER (OUTPATIENT)
Dept: CARDIAC REHAB | Age: 69
Setting detail: THERAPIES SERIES
Discharge: HOME | End: 2024-08-28
Payer: MEDICARE

## 2024-08-28 VITALS — WEIGHT: 213.8 LBS | BODY MASS INDEX: 27.45 KG/M2

## 2024-08-28 PROCEDURE — G0422 INTENS CARDIAC REHAB W/EXERC: HCPCS

## 2024-08-28 ASSESSMENT — EXERCISE STRESS TEST
PEAK_HR: 103
PEAK_RPE: 12
PEAK_METS: 4.4

## 2024-08-30 ENCOUNTER — HOSPITAL ENCOUNTER (OUTPATIENT)
Dept: CARDIAC REHAB | Age: 69
Setting detail: THERAPIES SERIES
Discharge: HOME | End: 2024-08-30
Payer: MEDICARE

## 2024-08-30 VITALS — BODY MASS INDEX: 27.55 KG/M2 | WEIGHT: 214.6 LBS

## 2024-08-30 PROCEDURE — G0422 INTENS CARDIAC REHAB W/EXERC: HCPCS

## 2024-08-30 ASSESSMENT — EXERCISE STRESS TEST
PEAK_HR: 97
PEAK_METS: 4.5
PEAK_RPE: 12

## 2024-09-04 ENCOUNTER — HOSPITAL ENCOUNTER (OUTPATIENT)
Age: 69
Discharge: HOME | End: 2024-09-04
Payer: MEDICARE

## 2024-09-04 ENCOUNTER — ANTI-COAG VISIT (OUTPATIENT)
Age: 69
End: 2024-09-04
Payer: MEDICARE

## 2024-09-04 ENCOUNTER — HOSPITAL ENCOUNTER (OUTPATIENT)
Dept: CARDIAC REHAB | Age: 69
Setting detail: THERAPIES SERIES
Discharge: HOME | End: 2024-09-04
Payer: MEDICARE

## 2024-09-04 VITALS — BODY MASS INDEX: 27.27 KG/M2 | WEIGHT: 212.4 LBS

## 2024-09-04 DIAGNOSIS — E78.49 OTHER HYPERLIPIDEMIA: ICD-10-CM

## 2024-09-04 DIAGNOSIS — I51.3 LEFT VENTRICULAR THROMBUS: Primary | ICD-10-CM

## 2024-09-04 DIAGNOSIS — I50.22 CHRONIC SYSTOLIC CONGESTIVE HEART FAILURE (HCC): ICD-10-CM

## 2024-09-04 DIAGNOSIS — Z95.811 LEFT VENTRICULAR ASSIST DEVICE PRESENT (HCC): ICD-10-CM

## 2024-09-04 DIAGNOSIS — Z12.5 SPECIAL SCREENING FOR MALIGNANT NEOPLASM OF PROSTATE: ICD-10-CM

## 2024-09-04 LAB
ALBUMIN SERPL-MCNC: 3.9 G/DL (ref 3.5–5.2)
ALBUMIN SERPL-MCNC: 3.9 G/DL (ref 3.5–5.2)
ALBUMIN/GLOB SERPL: 1.1 {RATIO} (ref 1–2.5)
ALBUMIN/GLOB SERPL: 1.1 {RATIO} (ref 1–2.5)
ALP SERPL-CCNC: 117 U/L (ref 40–129)
ALP SERPL-CCNC: 117 U/L (ref 40–129)
ALT SERPL-CCNC: 24 U/L (ref 5–41)
ALT SERPL-CCNC: 24 U/L (ref 5–41)
ANION GAP SERPL CALCULATED.3IONS-SCNC: 10 MMOL/L (ref 9–17)
ANION GAP SERPL CALCULATED.3IONS-SCNC: 10 MMOL/L (ref 9–17)
AST SERPL-CCNC: 25 U/L
AST SERPL-CCNC: 25 U/L
BILIRUB SERPL-MCNC: 0.4 MG/DL (ref 0.3–1.2)
BILIRUB SERPL-MCNC: 0.4 MG/DL (ref 0.3–1.2)
BNP SERPL-MCNC: 1140 PG/ML
BUN SERPL-MCNC: 24 MG/DL (ref 8–23)
BUN SERPL-MCNC: 24 MG/DL (ref 8–23)
BUN/CREAT SERPL: 15 (ref 9–20)
BUN/CREAT SERPL: 15 (ref 9–20)
CALCIUM SERPL-MCNC: 8.9 MG/DL (ref 8.6–10.4)
CALCIUM SERPL-MCNC: 8.9 MG/DL (ref 8.6–10.4)
CHLORIDE SERPL-SCNC: 102 MMOL/L (ref 98–107)
CHLORIDE SERPL-SCNC: 102 MMOL/L (ref 98–107)
CHOLEST SERPL-MCNC: 121 MG/DL (ref 0–199)
CHOLESTEROL/HDL RATIO: 3
CO2 SERPL-SCNC: 26 MMOL/L (ref 20–31)
CO2 SERPL-SCNC: 26 MMOL/L (ref 20–31)
CREAT SERPL-MCNC: 1.6 MG/DL (ref 0.7–1.2)
CREAT SERPL-MCNC: 1.6 MG/DL (ref 0.7–1.2)
ERYTHROCYTE [DISTWIDTH] IN BLOOD BY AUTOMATED COUNT: 14.3 % (ref 11.8–14.4)
GFR, ESTIMATED: 47 ML/MIN/1.73M2
GFR, ESTIMATED: 47 ML/MIN/1.73M2
GLUCOSE SERPL-MCNC: 118 MG/DL (ref 70–99)
GLUCOSE SERPL-MCNC: 118 MG/DL (ref 70–99)
HCT VFR BLD AUTO: 39.1 % (ref 40.7–50.3)
HDLC SERPL-MCNC: 42 MG/DL
HGB BLD-MCNC: 13.2 G/DL (ref 13–17)
INTERNATIONAL NORMALIZATION RATIO, POC: 3.6
LDH SERPL-CCNC: 192 U/L (ref 135–225)
LDLC SERPL CALC-MCNC: 49 MG/DL (ref 0–100)
MAGNESIUM SERPL-MCNC: 2.2 MG/DL (ref 1.6–2.6)
MCH RBC QN AUTO: 28.9 PG (ref 25.2–33.5)
MCHC RBC AUTO-ENTMCNC: 33.8 G/DL (ref 25.2–33.5)
MCV RBC AUTO: 85.7 FL (ref 82.6–102.9)
NRBC BLD-RTO: 0 PER 100 WBC
PLATELET # BLD AUTO: 246 K/UL (ref 138–453)
PMV BLD AUTO: 9.8 FL (ref 8.1–13.5)
POTASSIUM SERPL-SCNC: 4.5 MMOL/L (ref 3.7–5.3)
POTASSIUM SERPL-SCNC: 4.5 MMOL/L (ref 3.7–5.3)
PROT SERPL-MCNC: 7.3 G/DL (ref 6.4–8.3)
PROT SERPL-MCNC: 7.3 G/DL (ref 6.4–8.3)
PROTHROMBIN TIME, POC: 0
RBC # BLD AUTO: 4.56 M/UL (ref 4.21–5.77)
SODIUM SERPL-SCNC: 138 MMOL/L (ref 135–144)
SODIUM SERPL-SCNC: 138 MMOL/L (ref 135–144)
TRIGL SERPL-MCNC: 149 MG/DL
VLDLC SERPL CALC-MCNC: 30 MG/DL
WBC OTHER # BLD: 8.5 K/UL (ref 3.5–11.3)

## 2024-09-04 PROCEDURE — 85610 PROTHROMBIN TIME: CPT

## 2024-09-04 PROCEDURE — G0103 PSA SCREENING: HCPCS

## 2024-09-04 PROCEDURE — 83880 ASSAY OF NATRIURETIC PEPTIDE: CPT

## 2024-09-04 PROCEDURE — 80061 LIPID PANEL: CPT

## 2024-09-04 PROCEDURE — 99211 OFF/OP EST MAY X REQ PHY/QHP: CPT

## 2024-09-04 PROCEDURE — 83735 ASSAY OF MAGNESIUM: CPT

## 2024-09-04 PROCEDURE — 36415 COLL VENOUS BLD VENIPUNCTURE: CPT

## 2024-09-04 PROCEDURE — 80053 COMPREHEN METABOLIC PANEL: CPT

## 2024-09-04 PROCEDURE — 85027 COMPLETE CBC AUTOMATED: CPT

## 2024-09-04 PROCEDURE — 83615 LACTATE (LD) (LDH) ENZYME: CPT

## 2024-09-04 PROCEDURE — G0422 INTENS CARDIAC REHAB W/EXERC: HCPCS

## 2024-09-04 ASSESSMENT — EXERCISE STRESS TEST
PEAK_RPE: 12
PEAK_METS: 4.9
PEAK_HR: 103

## 2024-09-04 NOTE — PROGRESS NOTES
ANTICOAGULATION SERVICE    Date of Clinic Visit:  9/4/2024    Javier Bean is a 68 y.o. male who presents to clinic today for anticoagulation monitoring and adjustment.    Recent INR Results:  Internal QC passed  Lab Results   Component Value Date    INR 3.6 09/04/2024    INR 2.4 08/21/2024    INR 2.4 08/21/2024       Current Warfarin Dosage:  Dosing Plan  As of 9/4/2024      TTR:  78.9% (3.9 y)   Full warfarin instructions:  9/4: 2.5 mg; Otherwise 5 mg every day                 Assessment/Plan:      Modify warfarin dose as noted above: Patient is above target today.  Will reduce today's dose and re-check 2 weeks.    Next Clinic Appointment:  Return date  As of 9/4/2024      TTR:  78.9% (3.9 y)   Next INR check:  9/18/2024               Please call [facility] Anticoagulation Clinic at [phone] with any questions.     Thanks!  Sari Moise [facility]  Anticoagulation Service Pharmacist  9/4/2024 8:47 AM  For Pharmacy Admin Tracking Only    Intervention Detail: Dose Adjustment: 1, reason: Therapy De-escalation  Total # of Interventions Recommended: 1  Total # of Interventions Accepted: 1  Time Spent (min): 20

## 2024-09-05 LAB — PSA SERPL-MCNC: 0.9 NG/ML (ref 0–4)

## 2024-09-06 ENCOUNTER — HOSPITAL ENCOUNTER (OUTPATIENT)
Dept: CARDIAC REHAB | Age: 69
Setting detail: THERAPIES SERIES
Discharge: HOME | End: 2024-09-06
Payer: MEDICARE

## 2024-09-06 VITALS — WEIGHT: 214.6 LBS | BODY MASS INDEX: 27.55 KG/M2

## 2024-09-06 PROCEDURE — G0422 INTENS CARDIAC REHAB W/EXERC: HCPCS

## 2024-09-06 ASSESSMENT — EXERCISE STRESS TEST
PEAK_HR: 99
PEAK_METS: 4.6
PEAK_RPE: 12

## 2024-09-09 ENCOUNTER — HOSPITAL ENCOUNTER (OUTPATIENT)
Dept: CARDIAC REHAB | Age: 69
Setting detail: THERAPIES SERIES
Discharge: HOME | End: 2024-09-09
Payer: MEDICARE

## 2024-09-09 VITALS — BODY MASS INDEX: 27.55 KG/M2 | WEIGHT: 214.6 LBS

## 2024-09-09 PROCEDURE — G0422 INTENS CARDIAC REHAB W/EXERC: HCPCS

## 2024-09-09 ASSESSMENT — EXERCISE STRESS TEST
PEAK_METS: 4.9
PEAK_HR: 102
PEAK_RPE: 12

## 2024-09-11 ENCOUNTER — HOSPITAL ENCOUNTER (OUTPATIENT)
Dept: CARDIAC REHAB | Age: 69
Setting detail: THERAPIES SERIES
Discharge: HOME | End: 2024-09-11
Payer: MEDICARE

## 2024-09-11 VITALS — BODY MASS INDEX: 27.73 KG/M2 | WEIGHT: 216 LBS

## 2024-09-11 PROCEDURE — G0422 INTENS CARDIAC REHAB W/EXERC: HCPCS

## 2024-09-11 ASSESSMENT — EXERCISE STRESS TEST
PEAK_HR: 95
PEAK_METS: 4.8
PEAK_RPE: 12

## 2024-09-13 ENCOUNTER — HOSPITAL ENCOUNTER (OUTPATIENT)
Dept: CARDIAC REHAB | Age: 69
Setting detail: THERAPIES SERIES
Discharge: HOME | End: 2024-09-13
Payer: MEDICARE

## 2024-09-13 VITALS — BODY MASS INDEX: 27.63 KG/M2 | WEIGHT: 215.2 LBS

## 2024-09-13 PROCEDURE — G0422 INTENS CARDIAC REHAB W/EXERC: HCPCS

## 2024-09-13 ASSESSMENT — EXERCISE STRESS TEST
PEAK_HR: 98
PEAK_RPE: 12
PEAK_METS: 5

## 2024-09-16 ENCOUNTER — HOSPITAL ENCOUNTER (OUTPATIENT)
Dept: CARDIAC REHAB | Age: 69
Setting detail: THERAPIES SERIES
Discharge: HOME | End: 2024-09-16
Payer: MEDICARE

## 2024-09-16 VITALS — WEIGHT: 214.4 LBS | BODY MASS INDEX: 27.53 KG/M2

## 2024-09-16 PROCEDURE — G0422 INTENS CARDIAC REHAB W/EXERC: HCPCS

## 2024-09-16 ASSESSMENT — EXERCISE STRESS TEST
PEAK_METS: 4.5
PEAK_RPE: 12
PEAK_HR: 95

## 2024-09-18 ENCOUNTER — ANTI-COAG VISIT (OUTPATIENT)
Age: 69
End: 2024-09-18

## 2024-09-18 ENCOUNTER — HOSPITAL ENCOUNTER (OUTPATIENT)
Dept: CARDIAC REHAB | Age: 69
Setting detail: THERAPIES SERIES
Discharge: HOME | End: 2024-09-18
Payer: MEDICARE

## 2024-09-18 ENCOUNTER — HOSPITAL ENCOUNTER (OUTPATIENT)
Age: 69
Discharge: HOME | End: 2024-09-18
Payer: MEDICARE

## 2024-09-18 VITALS — WEIGHT: 215 LBS | BODY MASS INDEX: 27.6 KG/M2

## 2024-09-18 DIAGNOSIS — I51.3 LEFT VENTRICULAR THROMBUS: Primary | ICD-10-CM

## 2024-09-18 DIAGNOSIS — Z95.811 LEFT VENTRICULAR ASSIST DEVICE PRESENT (HCC): ICD-10-CM

## 2024-09-18 LAB
ALBUMIN SERPL-MCNC: 4 G/DL (ref 3.5–5.2)
ALBUMIN/GLOB SERPL: 1.2 {RATIO} (ref 1–2.5)
ALP SERPL-CCNC: 123 U/L (ref 40–129)
ALT SERPL-CCNC: 23 U/L (ref 5–41)
ANION GAP SERPL CALCULATED.3IONS-SCNC: 10 MMOL/L (ref 9–17)
AST SERPL-CCNC: 25 U/L
BILIRUB SERPL-MCNC: 0.5 MG/DL (ref 0.3–1.2)
BNP SERPL-MCNC: 1766 PG/ML
BUN SERPL-MCNC: 27 MG/DL (ref 8–23)
BUN/CREAT SERPL: 19 (ref 9–20)
CALCIUM SERPL-MCNC: 9.3 MG/DL (ref 8.6–10.4)
CHLORIDE SERPL-SCNC: 103 MMOL/L (ref 98–107)
CO2 SERPL-SCNC: 27 MMOL/L (ref 20–31)
CREAT SERPL-MCNC: 1.4 MG/DL (ref 0.7–1.2)
ERYTHROCYTE [DISTWIDTH] IN BLOOD BY AUTOMATED COUNT: 14.2 % (ref 11.8–14.4)
GFR, ESTIMATED: 55 ML/MIN/1.73M2
GLUCOSE SERPL-MCNC: 116 MG/DL (ref 70–99)
HCT VFR BLD AUTO: 39.6 % (ref 40.7–50.3)
HGB BLD-MCNC: 13.3 G/DL (ref 13–17)
INR PPP: 2.7
INTERNATIONAL NORMALIZATION RATIO, POC: 3
LDH SERPL-CCNC: 197 U/L (ref 135–225)
MAGNESIUM SERPL-MCNC: 2.2 MG/DL (ref 1.6–2.6)
MCH RBC QN AUTO: 29.6 PG (ref 25.2–33.5)
MCHC RBC AUTO-ENTMCNC: 33.6 G/DL (ref 25.2–33.5)
MCV RBC AUTO: 88 FL (ref 82.6–102.9)
NRBC BLD-RTO: 0 PER 100 WBC
PLATELET # BLD AUTO: 236 K/UL (ref 138–453)
PMV BLD AUTO: 9.7 FL (ref 8.1–13.5)
POTASSIUM SERPL-SCNC: 4.6 MMOL/L (ref 3.7–5.3)
PROT SERPL-MCNC: 7.4 G/DL (ref 6.4–8.3)
PROTHROMBIN TIME, POC: 0
PROTHROMBIN TIME: 27.4 SEC (ref 11.5–14.2)
RBC # BLD AUTO: 4.5 M/UL (ref 4.21–5.77)
SODIUM SERPL-SCNC: 140 MMOL/L (ref 135–144)
WBC OTHER # BLD: 8.4 K/UL (ref 3.5–11.3)

## 2024-09-18 PROCEDURE — 85027 COMPLETE CBC AUTOMATED: CPT

## 2024-09-18 PROCEDURE — 36415 COLL VENOUS BLD VENIPUNCTURE: CPT

## 2024-09-18 PROCEDURE — 83615 LACTATE (LD) (LDH) ENZYME: CPT

## 2024-09-18 PROCEDURE — G0422 INTENS CARDIAC REHAB W/EXERC: HCPCS

## 2024-09-18 PROCEDURE — 83880 ASSAY OF NATRIURETIC PEPTIDE: CPT

## 2024-09-18 PROCEDURE — 83735 ASSAY OF MAGNESIUM: CPT

## 2024-09-18 PROCEDURE — 85610 PROTHROMBIN TIME: CPT

## 2024-09-18 PROCEDURE — 80053 COMPREHEN METABOLIC PANEL: CPT

## 2024-09-18 ASSESSMENT — EXERCISE STRESS TEST
PEAK_HR: 98
PEAK_METS: 4.7
PEAK_RPE: 12

## 2024-09-23 ENCOUNTER — HOSPITAL ENCOUNTER (OUTPATIENT)
Dept: CARDIAC REHAB | Age: 69
Discharge: HOME | End: 2024-09-23
Payer: MEDICARE

## 2024-09-23 VITALS — BODY MASS INDEX: 27.94 KG/M2 | WEIGHT: 217.6 LBS

## 2024-09-23 PROCEDURE — G0422 INTENS CARDIAC REHAB W/EXERC: HCPCS

## 2024-09-23 ASSESSMENT — EXERCISE STRESS TEST
PEAK_METS: 4.9
PEAK_RPE: 12
PEAK_HR: 107

## 2024-09-25 ENCOUNTER — HOSPITAL ENCOUNTER (OUTPATIENT)
Dept: CARDIAC REHAB | Age: 69
Setting detail: THERAPIES SERIES
Discharge: HOME | End: 2024-09-25
Payer: MEDICARE

## 2024-09-25 VITALS — WEIGHT: 215 LBS | BODY MASS INDEX: 27.6 KG/M2

## 2024-09-25 PROCEDURE — G0422 INTENS CARDIAC REHAB W/EXERC: HCPCS

## 2024-09-25 ASSESSMENT — EXERCISE STRESS TEST
PEAK_RPE: 12
PEAK_HR: 105
PEAK_METS: 4.8

## 2024-09-27 ENCOUNTER — HOSPITAL ENCOUNTER (OUTPATIENT)
Dept: CARDIAC REHAB | Age: 69
Setting detail: THERAPIES SERIES
Discharge: HOME | End: 2024-09-27
Payer: MEDICARE

## 2024-09-27 VITALS — BODY MASS INDEX: 27.71 KG/M2 | WEIGHT: 215.8 LBS

## 2024-09-27 PROCEDURE — G0422 INTENS CARDIAC REHAB W/EXERC: HCPCS

## 2024-09-27 ASSESSMENT — EXERCISE STRESS TEST
PEAK_RPE: 12
PEAK_METS: 4.7
PEAK_HR: 92

## 2024-09-30 ENCOUNTER — HOSPITAL ENCOUNTER (OUTPATIENT)
Dept: CARDIAC REHAB | Age: 69
Setting detail: THERAPIES SERIES
Discharge: HOME | End: 2024-09-30
Payer: MEDICARE

## 2024-09-30 VITALS — BODY MASS INDEX: 27.99 KG/M2 | WEIGHT: 218 LBS

## 2024-09-30 PROCEDURE — G0422 INTENS CARDIAC REHAB W/EXERC: HCPCS

## 2024-09-30 ASSESSMENT — EXERCISE STRESS TEST
PEAK_RPE: 12
PEAK_HR: 102
PEAK_METS: 5

## 2024-10-02 ENCOUNTER — HOSPITAL ENCOUNTER (OUTPATIENT)
Dept: CARDIAC REHAB | Age: 69
Setting detail: THERAPIES SERIES
Discharge: HOME | End: 2024-10-02
Payer: MEDICARE

## 2024-10-02 ENCOUNTER — HOSPITAL ENCOUNTER (OUTPATIENT)
Age: 69
Discharge: HOME | End: 2024-10-02
Payer: MEDICARE

## 2024-10-02 ENCOUNTER — ANTI-COAG VISIT (OUTPATIENT)
Age: 69
End: 2024-10-02
Payer: MEDICARE

## 2024-10-02 VITALS — WEIGHT: 214 LBS | BODY MASS INDEX: 27.48 KG/M2

## 2024-10-02 DIAGNOSIS — I51.3 LEFT VENTRICULAR THROMBUS: Primary | ICD-10-CM

## 2024-10-02 DIAGNOSIS — Z95.811 LEFT VENTRICULAR ASSIST DEVICE PRESENT (HCC): ICD-10-CM

## 2024-10-02 LAB
ALBUMIN SERPL-MCNC: 4.3 G/DL (ref 3.5–5.2)
ALBUMIN/GLOB SERPL: 1.3 {RATIO} (ref 1–2.5)
ALP SERPL-CCNC: 118 U/L (ref 40–129)
ALT SERPL-CCNC: 25 U/L (ref 5–41)
ANION GAP SERPL CALCULATED.3IONS-SCNC: 11 MMOL/L (ref 9–17)
AST SERPL-CCNC: 29 U/L
BILIRUB SERPL-MCNC: 0.6 MG/DL (ref 0.3–1.2)
BNP SERPL-MCNC: 2084 PG/ML
BUN SERPL-MCNC: 31 MG/DL (ref 8–23)
BUN/CREAT SERPL: 21 (ref 9–20)
CALCIUM SERPL-MCNC: 9.5 MG/DL (ref 8.6–10.4)
CHLORIDE SERPL-SCNC: 105 MMOL/L (ref 98–107)
CO2 SERPL-SCNC: 25 MMOL/L (ref 20–31)
CREAT SERPL-MCNC: 1.5 MG/DL (ref 0.7–1.2)
ERYTHROCYTE [DISTWIDTH] IN BLOOD BY AUTOMATED COUNT: 14 % (ref 11.8–14.4)
GFR, ESTIMATED: 50 ML/MIN/1.73M2
GLUCOSE SERPL-MCNC: 113 MG/DL (ref 70–99)
HCT VFR BLD AUTO: 42.6 % (ref 40.7–50.3)
HGB BLD-MCNC: 14.1 G/DL (ref 13–17)
INR PPP: 2.4
INTERNATIONAL NORMALIZATION RATIO, POC: 2.6
LDH SERPL-CCNC: 198 U/L (ref 135–225)
MAGNESIUM SERPL-MCNC: 2.3 MG/DL (ref 1.6–2.6)
MCH RBC QN AUTO: 29.1 PG (ref 25.2–33.5)
MCHC RBC AUTO-ENTMCNC: 33.1 G/DL (ref 25.2–33.5)
MCV RBC AUTO: 88 FL (ref 82.6–102.9)
NRBC BLD-RTO: 0 PER 100 WBC
PLATELET # BLD AUTO: 250 K/UL (ref 138–453)
PMV BLD AUTO: 10 FL (ref 8.1–13.5)
POTASSIUM SERPL-SCNC: 4.6 MMOL/L (ref 3.7–5.3)
PROT SERPL-MCNC: 7.6 G/DL (ref 6.4–8.3)
PROTHROMBIN TIME, POC: 0
PROTHROMBIN TIME: 25.1 SEC (ref 11.5–14.2)
RBC # BLD AUTO: 4.84 M/UL (ref 4.21–5.77)
SODIUM SERPL-SCNC: 141 MMOL/L (ref 135–144)
WBC OTHER # BLD: 8.7 K/UL (ref 3.5–11.3)

## 2024-10-02 PROCEDURE — 83615 LACTATE (LD) (LDH) ENZYME: CPT

## 2024-10-02 PROCEDURE — 85610 PROTHROMBIN TIME: CPT

## 2024-10-02 PROCEDURE — 99211 OFF/OP EST MAY X REQ PHY/QHP: CPT

## 2024-10-02 PROCEDURE — G0422 INTENS CARDIAC REHAB W/EXERC: HCPCS

## 2024-10-02 PROCEDURE — 36415 COLL VENOUS BLD VENIPUNCTURE: CPT

## 2024-10-02 PROCEDURE — 80053 COMPREHEN METABOLIC PANEL: CPT

## 2024-10-02 PROCEDURE — 83880 ASSAY OF NATRIURETIC PEPTIDE: CPT

## 2024-10-02 PROCEDURE — 85027 COMPLETE CBC AUTOMATED: CPT

## 2024-10-02 PROCEDURE — 83735 ASSAY OF MAGNESIUM: CPT

## 2024-10-02 ASSESSMENT — EXERCISE STRESS TEST
PEAK_RPE: 12
PEAK_METS: 4.7
PEAK_HR: 110

## 2024-10-02 NOTE — PROGRESS NOTES
ANTICOAGULATION SERVICE    Date of Clinic Visit:  10/2/2024    Javier Bean is a 68 y.o. male who presents to clinic today for anticoagulation monitoring and adjustment.    Recent INR Results:  Internal QC passed  Lab Results   Component Value Date    INR 2.6 10/02/2024    INR 2.4 10/02/2024       Current Warfarin Dosage:  Dosing Plan  As of 10/2/2024      TTR:  78.6% (3.9 y)   Full warfarin instructions:  2.5 mg every Wed; 5 mg all other days                 Assessment/Plan:    Continue current regimen as INR remains stable.    Next Clinic Appointment:  Return date  As of 10/2/2024      TTR:  78.6% (3.9 y)   Next INR check:  10/22/2024               Please call [facility] Anticoagulation Clinic at [phone] with any questions.     Thanks!  Sari Moise, [facility]  Anticoagulation Service Pharmacist  10/2/2024 8:51 AM  For Pharmacy Admin Tracking Only    Intervention Detail: Dose Adjustment: 0, reason: Patient Preference  Total # of Interventions Recommended: 0  Total # of Interventions Accepted: 0  Time Spent (min): 15

## 2024-10-04 ENCOUNTER — HOSPITAL ENCOUNTER (OUTPATIENT)
Dept: CARDIAC REHAB | Age: 69
Setting detail: THERAPIES SERIES
Discharge: HOME | End: 2024-10-04
Payer: MEDICARE

## 2024-10-04 VITALS — WEIGHT: 213.8 LBS | BODY MASS INDEX: 27.45 KG/M2

## 2024-10-04 PROCEDURE — G0422 INTENS CARDIAC REHAB W/EXERC: HCPCS

## 2024-10-04 ASSESSMENT — EXERCISE STRESS TEST
PEAK_HR: 122
PEAK_RPE: 12
PEAK_METS: 4.8

## 2024-10-07 ENCOUNTER — HOSPITAL ENCOUNTER (OUTPATIENT)
Dept: CARDIAC REHAB | Age: 69
Setting detail: THERAPIES SERIES
Discharge: HOME | End: 2024-10-07
Payer: MEDICARE

## 2024-10-07 VITALS — WEIGHT: 213.4 LBS | BODY MASS INDEX: 27.4 KG/M2

## 2024-10-07 PROCEDURE — G0422 INTENS CARDIAC REHAB W/EXERC: HCPCS

## 2024-10-07 ASSESSMENT — EXERCISE STRESS TEST
PEAK_RPE: 12
PEAK_HR: 102
PEAK_METS: 5.2

## 2024-10-09 ENCOUNTER — HOSPITAL ENCOUNTER (OUTPATIENT)
Dept: CARDIAC REHAB | Age: 69
Setting detail: THERAPIES SERIES
Discharge: HOME | End: 2024-10-09
Payer: MEDICARE

## 2024-10-09 VITALS — BODY MASS INDEX: 27.32 KG/M2 | WEIGHT: 212.8 LBS

## 2024-10-09 PROCEDURE — G0422 INTENS CARDIAC REHAB W/EXERC: HCPCS

## 2024-10-09 ASSESSMENT — EXERCISE STRESS TEST
PEAK_RPE: 12
PEAK_METS: 5
PEAK_HR: 102

## 2024-10-15 ENCOUNTER — HOSPITAL ENCOUNTER (OUTPATIENT)
Dept: CARDIAC REHAB | Age: 69
Setting detail: THERAPIES SERIES
Discharge: HOME | End: 2024-10-15

## 2024-10-15 PROCEDURE — 9900000065 HC CARDIAC REHAB PHASE 3 - 1 VISIT

## 2024-10-16 ENCOUNTER — TELEPHONE (OUTPATIENT)
Dept: CARDIOLOGY | Age: 69
End: 2024-10-16

## 2024-10-16 NOTE — TELEPHONE ENCOUNTER
[facility]link transferred to [facility] [phone] per their request in carelink.  Pt also cancelled carelink appt with reason \"changed providers\".

## 2024-10-17 ENCOUNTER — HOSPITAL ENCOUNTER (OUTPATIENT)
Dept: CARDIAC REHAB | Age: 69
Setting detail: THERAPIES SERIES
Discharge: HOME | End: 2024-10-17

## 2024-10-17 PROCEDURE — 9900000065 HC CARDIAC REHAB PHASE 3 - 1 VISIT

## 2024-10-22 ENCOUNTER — HOSPITAL ENCOUNTER (OUTPATIENT)
Age: 69
Discharge: HOME | End: 2024-10-22
Payer: MEDICARE

## 2024-10-22 ENCOUNTER — ANTI-COAG VISIT (OUTPATIENT)
Age: 69
End: 2024-10-22
Payer: MEDICARE

## 2024-10-22 ENCOUNTER — HOSPITAL ENCOUNTER (OUTPATIENT)
Dept: CARDIAC REHAB | Age: 69
Setting detail: THERAPIES SERIES
Discharge: HOME | End: 2024-10-22

## 2024-10-22 DIAGNOSIS — Z95.811 LEFT VENTRICULAR ASSIST DEVICE PRESENT (HCC): ICD-10-CM

## 2024-10-22 DIAGNOSIS — I51.3 LEFT VENTRICULAR THROMBUS: Primary | ICD-10-CM

## 2024-10-22 LAB
ALBUMIN SERPL-MCNC: 4 G/DL (ref 3.5–5.2)
ALBUMIN/GLOB SERPL: 1.2 {RATIO} (ref 1–2.5)
ALP SERPL-CCNC: 112 U/L (ref 40–129)
ALT SERPL-CCNC: 22 U/L (ref 5–41)
ANION GAP SERPL CALCULATED.3IONS-SCNC: 10 MMOL/L (ref 9–17)
AST SERPL-CCNC: 26 U/L
BILIRUB SERPL-MCNC: 0.5 MG/DL (ref 0.3–1.2)
BNP SERPL-MCNC: 2008 PG/ML
BUN SERPL-MCNC: 26 MG/DL (ref 8–23)
BUN/CREAT SERPL: 19 (ref 9–20)
CALCIUM SERPL-MCNC: 9.2 MG/DL (ref 8.6–10.4)
CHLORIDE SERPL-SCNC: 104 MMOL/L (ref 98–107)
CO2 SERPL-SCNC: 26 MMOL/L (ref 20–31)
CREAT SERPL-MCNC: 1.4 MG/DL (ref 0.7–1.2)
ERYTHROCYTE [DISTWIDTH] IN BLOOD BY AUTOMATED COUNT: 13.7 % (ref 11.8–14.4)
GFR, ESTIMATED: 54 ML/MIN/1.73M2
GLUCOSE SERPL-MCNC: 134 MG/DL (ref 70–99)
HCT VFR BLD AUTO: 39.8 % (ref 40.7–50.3)
HGB BLD-MCNC: 13.3 G/DL (ref 13–17)
INR PPP: 3.7
INTERNATIONAL NORMALIZATION RATIO, POC: 4.4
LDH SERPL-CCNC: 236 U/L (ref 135–225)
MAGNESIUM SERPL-MCNC: 2.2 MG/DL (ref 1.6–2.6)
MCH RBC QN AUTO: 29.4 PG (ref 25.2–33.5)
MCHC RBC AUTO-ENTMCNC: 33.4 G/DL (ref 25.2–33.5)
MCV RBC AUTO: 87.9 FL (ref 82.6–102.9)
NRBC BLD-RTO: 0 PER 100 WBC
PLATELET # BLD AUTO: 246 K/UL (ref 138–453)
PMV BLD AUTO: 10 FL (ref 8.1–13.5)
POTASSIUM SERPL-SCNC: 4.7 MMOL/L (ref 3.7–5.3)
PROT SERPL-MCNC: 7.4 G/DL (ref 6.4–8.3)
PROTHROMBIN TIME, POC: 0
PROTHROMBIN TIME: 34.4 SEC (ref 11.5–14.2)
RBC # BLD AUTO: 4.53 M/UL (ref 4.21–5.77)
SODIUM SERPL-SCNC: 140 MMOL/L (ref 135–144)
WBC OTHER # BLD: 7.5 K/UL (ref 3.5–11.3)

## 2024-10-22 PROCEDURE — 99212 OFFICE O/P EST SF 10 MIN: CPT

## 2024-10-22 PROCEDURE — 83615 LACTATE (LD) (LDH) ENZYME: CPT

## 2024-10-22 PROCEDURE — 85610 PROTHROMBIN TIME: CPT

## 2024-10-22 PROCEDURE — 80053 COMPREHEN METABOLIC PANEL: CPT

## 2024-10-22 PROCEDURE — 36415 COLL VENOUS BLD VENIPUNCTURE: CPT

## 2024-10-22 PROCEDURE — 83735 ASSAY OF MAGNESIUM: CPT

## 2024-10-22 PROCEDURE — 85027 COMPLETE CBC AUTOMATED: CPT

## 2024-10-22 PROCEDURE — 83880 ASSAY OF NATRIURETIC PEPTIDE: CPT

## 2024-10-24 ENCOUNTER — HOSPITAL ENCOUNTER (OUTPATIENT)
Dept: CARDIAC REHAB | Age: 69
Setting detail: THERAPIES SERIES
Discharge: HOME | End: 2024-10-24

## 2024-10-24 PROCEDURE — 9900000065 HC CARDIAC REHAB PHASE 3 - 1 VISIT

## 2024-10-29 ENCOUNTER — HOSPITAL ENCOUNTER (OUTPATIENT)
Dept: CARDIAC REHAB | Age: 69
Setting detail: THERAPIES SERIES
Discharge: HOME | End: 2024-10-29

## 2024-10-29 PROCEDURE — 9900000065 HC CARDIAC REHAB PHASE 3 - 1 VISIT

## 2024-10-30 ENCOUNTER — APPOINTMENT (OUTPATIENT)
Age: 69
End: 2024-10-30
Payer: MEDICARE

## 2024-10-31 ENCOUNTER — HOSPITAL ENCOUNTER (OUTPATIENT)
Dept: CARDIAC REHAB | Age: 69
Setting detail: THERAPIES SERIES
Discharge: HOME | End: 2024-10-31

## 2024-10-31 PROCEDURE — 9900000065 HC CARDIAC REHAB PHASE 3 - 1 VISIT

## 2024-11-12 ENCOUNTER — HOSPITAL ENCOUNTER (OUTPATIENT)
Dept: CARDIAC REHAB | Age: 69
Setting detail: THERAPIES SERIES
Discharge: HOME | End: 2024-11-12

## 2024-11-12 ENCOUNTER — HOSPITAL ENCOUNTER (OUTPATIENT)
Age: 69
Discharge: HOME | End: 2024-11-12
Payer: MEDICARE

## 2024-11-12 ENCOUNTER — ANTI-COAG VISIT (OUTPATIENT)
Age: 69
End: 2024-11-12
Payer: MEDICARE

## 2024-11-12 DIAGNOSIS — I51.3 LEFT VENTRICULAR THROMBUS: Primary | ICD-10-CM

## 2024-11-12 DIAGNOSIS — Z95.811 LEFT VENTRICULAR ASSIST DEVICE PRESENT (HCC): ICD-10-CM

## 2024-11-12 LAB
ALBUMIN SERPL-MCNC: 4.1 G/DL (ref 3.5–5.2)
ALBUMIN/GLOB SERPL: 1.3 {RATIO} (ref 1–2.5)
ALP SERPL-CCNC: 106 U/L (ref 40–129)
ALT SERPL-CCNC: 20 U/L (ref 5–41)
ANION GAP SERPL CALCULATED.3IONS-SCNC: 9 MMOL/L (ref 9–17)
AST SERPL-CCNC: 25 U/L
BILIRUB SERPL-MCNC: 0.7 MG/DL (ref 0.3–1.2)
BNP SERPL-MCNC: 1343 PG/ML
BUN SERPL-MCNC: 32 MG/DL (ref 8–23)
BUN/CREAT SERPL: 21 (ref 9–20)
CALCIUM SERPL-MCNC: 9.4 MG/DL (ref 8.6–10.4)
CHLORIDE SERPL-SCNC: 103 MMOL/L (ref 98–107)
CO2 SERPL-SCNC: 28 MMOL/L (ref 20–31)
CREAT SERPL-MCNC: 1.5 MG/DL (ref 0.7–1.2)
ERYTHROCYTE [DISTWIDTH] IN BLOOD BY AUTOMATED COUNT: 13.5 % (ref 11.8–14.4)
GFR, ESTIMATED: 50 ML/MIN/1.73M2
GLUCOSE SERPL-MCNC: 106 MG/DL (ref 70–99)
HCT VFR BLD AUTO: 41.3 % (ref 40.7–50.3)
HGB BLD-MCNC: 13.9 G/DL (ref 13–17)
INR PPP: 2.4
INTERNATIONAL NORMALIZATION RATIO, POC: 2.6
LDH SERPL-CCNC: 199 U/L (ref 135–225)
MAGNESIUM SERPL-MCNC: 2.3 MG/DL (ref 1.6–2.6)
MCH RBC QN AUTO: 29.5 PG (ref 25.2–33.5)
MCHC RBC AUTO-ENTMCNC: 33.7 G/DL (ref 25.2–33.5)
MCV RBC AUTO: 87.7 FL (ref 82.6–102.9)
NRBC BLD-RTO: 0 PER 100 WBC
PLATELET # BLD AUTO: 221 K/UL (ref 138–453)
PMV BLD AUTO: 10.1 FL (ref 8.1–13.5)
POTASSIUM SERPL-SCNC: 4.8 MMOL/L (ref 3.7–5.3)
PROT SERPL-MCNC: 7.3 G/DL (ref 6.4–8.3)
PROTHROMBIN TIME, POC: 0
PROTHROMBIN TIME: 25.3 SEC (ref 11.5–14.2)
RBC # BLD AUTO: 4.71 M/UL (ref 4.21–5.77)
SODIUM SERPL-SCNC: 140 MMOL/L (ref 135–144)
WBC OTHER # BLD: 7.8 K/UL (ref 3.5–11.3)

## 2024-11-12 PROCEDURE — 85610 PROTHROMBIN TIME: CPT

## 2024-11-12 PROCEDURE — 80053 COMPREHEN METABOLIC PANEL: CPT

## 2024-11-12 PROCEDURE — 99211 OFF/OP EST MAY X REQ PHY/QHP: CPT

## 2024-11-12 PROCEDURE — 83735 ASSAY OF MAGNESIUM: CPT

## 2024-11-12 PROCEDURE — 9900000065 HC CARDIAC REHAB PHASE 3 - 1 VISIT

## 2024-11-12 PROCEDURE — 85027 COMPLETE CBC AUTOMATED: CPT

## 2024-11-12 PROCEDURE — 83880 ASSAY OF NATRIURETIC PEPTIDE: CPT

## 2024-11-12 PROCEDURE — 36415 COLL VENOUS BLD VENIPUNCTURE: CPT

## 2024-11-12 PROCEDURE — 83615 LACTATE (LD) (LDH) ENZYME: CPT

## 2024-11-12 NOTE — PROGRESS NOTES
ANTICOAGULATION SERVICE    Date of Clinic Visit:  11/12/2024    Javier Bean is a 69 y.o. male who presents to clinic today for anticoagulation monitoring and adjustment.    Recent INR Results:  Internal QC passed  Lab Results   Component Value Date    INR 2.6 11/12/2024    INR 4.4 10/22/2024       Current Warfarin Dosage:  **Insert warfarin dose here**    Assessment/Plan:    Continue current regimen as INR remains stable.  OR   Modify warfarin dose as noted above:     Next Clinic Appointment:  Return date  As of 11/12/2024      TTR:  77.6% (4 y)   Next INR check:  11/26/2024               Please call [facility] at [phone] with any questions.     Thanks!  Sari Moise [facility]  Anticoagulation Service Pharmacist  11/12/2024 8:24 AM  For Pharmacy Admin Tracking Only    Intervention Detail: Dose Adjustment: 0, reason: Patient Preference  Total # of Interventions Recommended: 0  Total # of Interventions Accepted: 0  Time Spent (min): 15

## 2024-11-14 ENCOUNTER — HOSPITAL ENCOUNTER (OUTPATIENT)
Dept: CARDIAC REHAB | Age: 69
Setting detail: THERAPIES SERIES
Discharge: HOME | End: 2024-11-14

## 2024-11-19 ENCOUNTER — HOSPITAL ENCOUNTER (OUTPATIENT)
Dept: CARDIAC REHAB | Age: 69
Setting detail: THERAPIES SERIES
Discharge: HOME | End: 2024-11-19

## 2024-11-21 ENCOUNTER — HOSPITAL ENCOUNTER (OUTPATIENT)
Dept: CARDIAC REHAB | Age: 69
Setting detail: THERAPIES SERIES
Discharge: HOME | End: 2024-11-21

## 2024-11-26 ENCOUNTER — ANTI-COAG VISIT (OUTPATIENT)
Age: 69
End: 2024-11-26
Payer: MEDICARE

## 2024-11-26 ENCOUNTER — HOSPITAL ENCOUNTER (OUTPATIENT)
Age: 69
Discharge: HOME | End: 2024-11-26
Payer: MEDICARE

## 2024-11-26 DIAGNOSIS — Z95.811 LEFT VENTRICULAR ASSIST DEVICE PRESENT (HCC): ICD-10-CM

## 2024-11-26 DIAGNOSIS — I51.3 LEFT VENTRICULAR THROMBUS: Primary | ICD-10-CM

## 2024-11-26 LAB
ALBUMIN SERPL-MCNC: 4.1 G/DL (ref 3.5–5.2)
ALBUMIN/GLOB SERPL: 1.2 {RATIO} (ref 1–2.5)
ALP SERPL-CCNC: 112 U/L (ref 40–129)
ALT SERPL-CCNC: 18 U/L (ref 5–41)
ANION GAP SERPL CALCULATED.3IONS-SCNC: 9 MMOL/L (ref 9–17)
AST SERPL-CCNC: 23 U/L
BILIRUB SERPL-MCNC: 0.7 MG/DL (ref 0.3–1.2)
BNP SERPL-MCNC: 1359 PG/ML
BUN SERPL-MCNC: 29 MG/DL (ref 8–23)
BUN/CREAT SERPL: 21 (ref 9–20)
CALCIUM SERPL-MCNC: 9.9 MG/DL (ref 8.6–10.4)
CHLORIDE SERPL-SCNC: 103 MMOL/L (ref 98–107)
CO2 SERPL-SCNC: 28 MMOL/L (ref 20–31)
CREAT SERPL-MCNC: 1.4 MG/DL (ref 0.7–1.2)
ERYTHROCYTE [DISTWIDTH] IN BLOOD BY AUTOMATED COUNT: 13.2 % (ref 11.8–14.4)
GFR, ESTIMATED: 54 ML/MIN/1.73M2
GLUCOSE SERPL-MCNC: 143 MG/DL (ref 70–99)
HCT VFR BLD AUTO: 42.8 % (ref 40.7–50.3)
HGB BLD-MCNC: 14.4 G/DL (ref 13–17)
INR PPP: 1.9
LDH SERPL-CCNC: 210 U/L (ref 135–225)
MAGNESIUM SERPL-MCNC: 2.2 MG/DL (ref 1.6–2.6)
MCH RBC QN AUTO: 29.8 PG (ref 25.2–33.5)
MCHC RBC AUTO-ENTMCNC: 33.6 G/DL (ref 25.2–33.5)
MCV RBC AUTO: 88.6 FL (ref 82.6–102.9)
NRBC BLD-RTO: 0 PER 100 WBC
PLATELET # BLD AUTO: 211 K/UL (ref 138–453)
PMV BLD AUTO: 9.6 FL (ref 8.1–13.5)
POTASSIUM SERPL-SCNC: 4.4 MMOL/L (ref 3.7–5.3)
PROT SERPL-MCNC: 7.5 G/DL (ref 6.4–8.3)
PROTHROMBIN TIME: 20.8 SEC (ref 11.5–14.2)
RBC # BLD AUTO: 4.83 M/UL (ref 4.21–5.77)
SODIUM SERPL-SCNC: 140 MMOL/L (ref 135–144)
WBC OTHER # BLD: 7.9 K/UL (ref 3.5–11.3)

## 2024-11-26 PROCEDURE — 85610 PROTHROMBIN TIME: CPT

## 2024-11-26 PROCEDURE — 80053 COMPREHEN METABOLIC PANEL: CPT

## 2024-11-26 PROCEDURE — 83735 ASSAY OF MAGNESIUM: CPT

## 2024-11-26 PROCEDURE — 36415 COLL VENOUS BLD VENIPUNCTURE: CPT

## 2024-11-26 PROCEDURE — 83880 ASSAY OF NATRIURETIC PEPTIDE: CPT

## 2024-11-26 PROCEDURE — 83615 LACTATE (LD) (LDH) ENZYME: CPT

## 2024-11-26 PROCEDURE — 99211 OFF/OP EST MAY X REQ PHY/QHP: CPT

## 2024-11-26 PROCEDURE — 85027 COMPLETE CBC AUTOMATED: CPT

## 2024-11-26 NOTE — PROGRESS NOTES
ANTICOAGULATION SERVICE    Date of Clinic Visit:  11/26/2024    Javier Bean is a 69 y.o. male who presents to clinic today for anticoagulation monitoring and adjustment.    Recent INR Results:  Internal QC passed  Lab Results   Component Value Date    INR 1.9 11/26/2024    INR 2.6 11/12/2024       Current Warfarin Dosage:  Dosing Plan  As of 11/26/2024      TTR:  77.7% (4.1 y)   Full warfarin instructions:  11/27: 7.5 mg; Otherwise 2.5 mg every Wed, Sat; 5 mg all other days                 Assessment/Plan:    Modify warfarin dose as noted above: Patient is just below target. Will increase tomorrows dose and re-check 2 weeks.    Next Clinic Appointment:  Return date  As of 11/26/2024      TTR:  77.7% (4.1 y)   Next INR check:  12/10/2024               Please call [facility] at [phone] with any questions.     Thanks!  Sari Moise [facility]  Anticoagulation Service Pharmacist  11/26/2024 8:27 AM  For Pharmacy Admin Tracking Only    Intervention Detail: Dose Adjustment: 1, reason: Therapy Optimization  Total # of Interventions Recommended: 1  Total # of Interventions Accepted: 1  Time Spent (min): 15

## 2024-12-03 ENCOUNTER — HOSPITAL ENCOUNTER (OUTPATIENT)
Dept: CARDIAC REHAB | Age: 69
Setting detail: THERAPIES SERIES
Discharge: HOME | End: 2024-12-03

## 2024-12-05 ENCOUNTER — HOSPITAL ENCOUNTER (OUTPATIENT)
Dept: CARDIAC REHAB | Age: 69
Setting detail: THERAPIES SERIES
Discharge: HOME | End: 2024-12-05

## 2024-12-10 ENCOUNTER — TELEPHONE (OUTPATIENT)
Age: 69
End: 2024-12-10

## 2024-12-10 ENCOUNTER — HOSPITAL ENCOUNTER (OUTPATIENT)
Dept: CARDIAC REHAB | Age: 69
Setting detail: THERAPIES SERIES
Discharge: HOME | End: 2024-12-10

## 2024-12-10 PROCEDURE — 9900000065 HC CARDIAC REHAB PHASE 3 - 1 VISIT

## 2024-12-10 NOTE — TELEPHONE ENCOUNTER
spoke with pt he is missed appt for INR today. [facility] checking tomorrow. REscheduled fro 1/14/25

## 2024-12-12 ENCOUNTER — HOSPITAL ENCOUNTER (OUTPATIENT)
Dept: CARDIAC REHAB | Age: 69
Setting detail: THERAPIES SERIES
Discharge: HOME | End: 2024-12-12

## 2024-12-12 PROCEDURE — 9900000065 HC CARDIAC REHAB PHASE 3 - 1 VISIT

## 2024-12-17 ENCOUNTER — HOSPITAL ENCOUNTER (OUTPATIENT)
Dept: CARDIAC REHAB | Age: 69
Setting detail: THERAPIES SERIES
Discharge: HOME | End: 2024-12-17

## 2024-12-19 ENCOUNTER — HOSPITAL ENCOUNTER (OUTPATIENT)
Dept: CARDIAC REHAB | Age: 69
Setting detail: THERAPIES SERIES
Discharge: HOME | End: 2024-12-19

## 2024-12-23 ENCOUNTER — HOSPITAL ENCOUNTER (OUTPATIENT)
Age: 69
Discharge: HOME | End: 2024-12-23
Payer: MEDICARE

## 2024-12-23 LAB
ALBUMIN SERPL-MCNC: 4.2 G/DL (ref 3.5–5.2)
ALBUMIN/GLOB SERPL: 1.3 {RATIO} (ref 1–2.5)
ALP SERPL-CCNC: 114 U/L (ref 40–129)
ALT SERPL-CCNC: 21 U/L (ref 5–41)
ANION GAP SERPL CALCULATED.3IONS-SCNC: 9 MMOL/L (ref 9–17)
AST SERPL-CCNC: 25 U/L
BILIRUB SERPL-MCNC: 0.4 MG/DL (ref 0.3–1.2)
BNP SERPL-MCNC: 1627 PG/ML
BUN SERPL-MCNC: 24 MG/DL (ref 8–23)
BUN/CREAT SERPL: 18 (ref 9–20)
CALCIUM SERPL-MCNC: 9.3 MG/DL (ref 8.6–10.4)
CHLORIDE SERPL-SCNC: 101 MMOL/L (ref 98–107)
CO2 SERPL-SCNC: 30 MMOL/L (ref 20–31)
CREAT SERPL-MCNC: 1.3 MG/DL (ref 0.7–1.2)
ERYTHROCYTE [DISTWIDTH] IN BLOOD BY AUTOMATED COUNT: 13.4 % (ref 11.8–14.4)
GFR, ESTIMATED: 59 ML/MIN/1.73M2
GLUCOSE SERPL-MCNC: 87 MG/DL (ref 70–99)
HCT VFR BLD AUTO: 41.4 % (ref 40.7–50.3)
HGB BLD-MCNC: 13.7 G/DL (ref 13–17)
INR PPP: 1.8
LDH SERPL-CCNC: 191 U/L (ref 135–225)
MAGNESIUM SERPL-MCNC: 2.2 MG/DL (ref 1.6–2.6)
MCH RBC QN AUTO: 29.4 PG (ref 25.2–33.5)
MCHC RBC AUTO-ENTMCNC: 33.1 G/DL (ref 25.2–33.5)
MCV RBC AUTO: 88.8 FL (ref 82.6–102.9)
NRBC BLD-RTO: 0 PER 100 WBC
PLATELET # BLD AUTO: 204 K/UL (ref 138–453)
PMV BLD AUTO: 10 FL (ref 8.1–13.5)
POTASSIUM SERPL-SCNC: 4.7 MMOL/L (ref 3.7–5.3)
PROT SERPL-MCNC: 7.5 G/DL (ref 6.4–8.3)
PROTHROMBIN TIME: 20.4 SEC (ref 11.5–14.2)
RBC # BLD AUTO: 4.66 M/UL (ref 4.21–5.77)
SODIUM SERPL-SCNC: 140 MMOL/L (ref 135–144)
WBC OTHER # BLD: 7.7 K/UL (ref 3.5–11.3)

## 2024-12-23 PROCEDURE — 83615 LACTATE (LD) (LDH) ENZYME: CPT

## 2024-12-23 PROCEDURE — 85610 PROTHROMBIN TIME: CPT

## 2024-12-23 PROCEDURE — 83880 ASSAY OF NATRIURETIC PEPTIDE: CPT

## 2024-12-23 PROCEDURE — 80053 COMPREHEN METABOLIC PANEL: CPT

## 2024-12-23 PROCEDURE — 36415 COLL VENOUS BLD VENIPUNCTURE: CPT

## 2024-12-23 PROCEDURE — 83735 ASSAY OF MAGNESIUM: CPT

## 2024-12-23 PROCEDURE — 85027 COMPLETE CBC AUTOMATED: CPT

## 2025-01-13 ENCOUNTER — HOSPITAL ENCOUNTER (OUTPATIENT)
Age: 70
Discharge: HOME | End: 2025-01-13
Payer: MEDICARE

## 2025-01-13 LAB
ALBUMIN SERPL-MCNC: 4.2 G/DL (ref 3.5–5.2)
ALBUMIN/GLOB SERPL: 1.4 {RATIO} (ref 1–2.5)
ALP SERPL-CCNC: 111 U/L (ref 40–129)
ALT SERPL-CCNC: 23 U/L (ref 5–41)
ANION GAP SERPL CALCULATED.3IONS-SCNC: 11 MMOL/L (ref 9–17)
AST SERPL-CCNC: 26 U/L
BILIRUB SERPL-MCNC: 0.5 MG/DL (ref 0.3–1.2)
BNP SERPL-MCNC: 1061 PG/ML
BUN SERPL-MCNC: 28 MG/DL (ref 8–23)
BUN/CREAT SERPL: 22 (ref 9–20)
CALCIUM SERPL-MCNC: 9.4 MG/DL (ref 8.6–10.4)
CHLORIDE SERPL-SCNC: 104 MMOL/L (ref 98–107)
CO2 SERPL-SCNC: 26 MMOL/L (ref 20–31)
CREAT SERPL-MCNC: 1.3 MG/DL (ref 0.7–1.2)
ERYTHROCYTE [DISTWIDTH] IN BLOOD BY AUTOMATED COUNT: 13.4 % (ref 11.8–14.4)
GFR, ESTIMATED: 59 ML/MIN/1.73M2
GLUCOSE SERPL-MCNC: 135 MG/DL (ref 70–99)
HCT VFR BLD AUTO: 45.3 % (ref 40.7–50.3)
HGB BLD-MCNC: 15 G/DL (ref 13–17)
INR PPP: 1.6
LDH SERPL-CCNC: 205 U/L (ref 135–225)
MAGNESIUM SERPL-MCNC: 2.2 MG/DL (ref 1.6–2.6)
MCH RBC QN AUTO: 29.6 PG (ref 25.2–33.5)
MCHC RBC AUTO-ENTMCNC: 33.1 G/DL (ref 25.2–33.5)
MCV RBC AUTO: 89.3 FL (ref 82.6–102.9)
NRBC BLD-RTO: 0 PER 100 WBC
PLATELET # BLD AUTO: 205 K/UL (ref 138–453)
PMV BLD AUTO: 9.5 FL (ref 8.1–13.5)
POTASSIUM SERPL-SCNC: 4.8 MMOL/L (ref 3.7–5.3)
PROT SERPL-MCNC: 7.3 G/DL (ref 6.4–8.3)
PROTHROMBIN TIME: 18.2 SEC (ref 11.5–14.2)
RBC # BLD AUTO: 5.07 M/UL (ref 4.21–5.77)
SODIUM SERPL-SCNC: 141 MMOL/L (ref 135–144)
WBC OTHER # BLD: 7.7 K/UL (ref 3.5–11.3)

## 2025-01-13 PROCEDURE — 36415 COLL VENOUS BLD VENIPUNCTURE: CPT

## 2025-01-13 PROCEDURE — 85610 PROTHROMBIN TIME: CPT

## 2025-01-13 PROCEDURE — 83880 ASSAY OF NATRIURETIC PEPTIDE: CPT

## 2025-01-13 PROCEDURE — 80053 COMPREHEN METABOLIC PANEL: CPT

## 2025-01-13 PROCEDURE — 85027 COMPLETE CBC AUTOMATED: CPT

## 2025-01-13 PROCEDURE — 83735 ASSAY OF MAGNESIUM: CPT

## 2025-01-13 PROCEDURE — 83615 LACTATE (LD) (LDH) ENZYME: CPT

## 2025-01-14 ENCOUNTER — HOSPITAL ENCOUNTER (OUTPATIENT)
Dept: CARDIAC REHAB | Age: 70
Setting detail: THERAPIES SERIES
Discharge: HOME | End: 2025-01-14

## 2025-01-14 PROCEDURE — 9900000065 HC CARDIAC REHAB PHASE 3 - 1 VISIT

## 2025-01-16 ENCOUNTER — HOSPITAL ENCOUNTER (OUTPATIENT)
Dept: CARDIAC REHAB | Age: 70
Setting detail: THERAPIES SERIES
Discharge: HOME | End: 2025-01-16

## 2025-01-16 PROCEDURE — 9900000065 HC CARDIAC REHAB PHASE 3 - 1 VISIT

## 2025-01-20 ENCOUNTER — HOSPITAL ENCOUNTER (OUTPATIENT)
Age: 70
Discharge: HOME | End: 2025-01-20
Payer: MEDICARE

## 2025-01-20 LAB
ALBUMIN SERPL-MCNC: 4.4 G/DL (ref 3.5–5.2)
ALBUMIN/GLOB SERPL: 1.6 {RATIO} (ref 1–2.5)
ALP SERPL-CCNC: 109 U/L (ref 40–129)
ALT SERPL-CCNC: 25 U/L (ref 5–41)
ANION GAP SERPL CALCULATED.3IONS-SCNC: 8 MMOL/L (ref 9–17)
AST SERPL-CCNC: 28 U/L
BILIRUB SERPL-MCNC: 0.6 MG/DL (ref 0.3–1.2)
BNP SERPL-MCNC: 1250 PG/ML
BUN SERPL-MCNC: 30 MG/DL (ref 8–23)
BUN/CREAT SERPL: 23 (ref 9–20)
CALCIUM SERPL-MCNC: 9.7 MG/DL (ref 8.6–10.4)
CHLORIDE SERPL-SCNC: 103 MMOL/L (ref 98–107)
CO2 SERPL-SCNC: 28 MMOL/L (ref 20–31)
CREAT SERPL-MCNC: 1.3 MG/DL (ref 0.7–1.2)
ERYTHROCYTE [DISTWIDTH] IN BLOOD BY AUTOMATED COUNT: 13.4 % (ref 11.8–14.4)
GFR, ESTIMATED: 59 ML/MIN/1.73M2
GLUCOSE SERPL-MCNC: 113 MG/DL (ref 70–99)
HCT VFR BLD AUTO: 44.6 % (ref 40.7–50.3)
HGB BLD-MCNC: 14.7 G/DL (ref 13–17)
INR PPP: 1.9
LDH SERPL-CCNC: 187 U/L (ref 135–225)
MAGNESIUM SERPL-MCNC: 2.2 MG/DL (ref 1.6–2.6)
MCH RBC QN AUTO: 29.6 PG (ref 25.2–33.5)
MCHC RBC AUTO-ENTMCNC: 33 G/DL (ref 25.2–33.5)
MCV RBC AUTO: 89.9 FL (ref 82.6–102.9)
NRBC BLD-RTO: 0 PER 100 WBC
PLATELET # BLD AUTO: 202 K/UL (ref 138–453)
PMV BLD AUTO: 9.8 FL (ref 8.1–13.5)
POTASSIUM SERPL-SCNC: 4.6 MMOL/L (ref 3.7–5.3)
PROT SERPL-MCNC: 7.2 G/DL (ref 6.4–8.3)
PROTHROMBIN TIME: 21.1 SEC (ref 11.5–14.2)
RBC # BLD AUTO: 4.96 M/UL (ref 4.21–5.77)
SODIUM SERPL-SCNC: 139 MMOL/L (ref 135–144)
WBC OTHER # BLD: 7.9 K/UL (ref 3.5–11.3)

## 2025-01-20 PROCEDURE — 85610 PROTHROMBIN TIME: CPT

## 2025-01-20 PROCEDURE — 36415 COLL VENOUS BLD VENIPUNCTURE: CPT

## 2025-01-20 PROCEDURE — 85027 COMPLETE CBC AUTOMATED: CPT

## 2025-01-20 PROCEDURE — 83880 ASSAY OF NATRIURETIC PEPTIDE: CPT

## 2025-01-20 PROCEDURE — 83615 LACTATE (LD) (LDH) ENZYME: CPT

## 2025-01-20 PROCEDURE — 80053 COMPREHEN METABOLIC PANEL: CPT

## 2025-01-20 PROCEDURE — 83735 ASSAY OF MAGNESIUM: CPT

## 2025-01-21 ENCOUNTER — HOSPITAL ENCOUNTER (OUTPATIENT)
Dept: CARDIAC REHAB | Age: 70
Setting detail: THERAPIES SERIES
Discharge: HOME | End: 2025-01-21

## 2025-01-21 PROCEDURE — 9900000065 HC CARDIAC REHAB PHASE 3 - 1 VISIT

## 2025-01-23 ENCOUNTER — HOSPITAL ENCOUNTER (OUTPATIENT)
Dept: CARDIAC REHAB | Age: 70
Setting detail: THERAPIES SERIES
Discharge: HOME | End: 2025-01-23

## 2025-01-28 ENCOUNTER — HOSPITAL ENCOUNTER (OUTPATIENT)
Dept: CARDIAC REHAB | Age: 70
Discharge: HOME | End: 2025-01-28

## 2025-01-30 ENCOUNTER — HOSPITAL ENCOUNTER (OUTPATIENT)
Dept: CARDIAC REHAB | Age: 70
Discharge: HOME | End: 2025-01-30

## 2025-01-30 PROCEDURE — 9900000065 HC CARDIAC REHAB PHASE 3 - 1 VISIT

## 2025-02-04 ENCOUNTER — HOSPITAL ENCOUNTER (OUTPATIENT)
Age: 70
Discharge: HOME | End: 2025-02-04
Payer: MEDICARE

## 2025-02-04 ENCOUNTER — HOSPITAL ENCOUNTER (OUTPATIENT)
Dept: CARDIAC REHAB | Age: 70
Setting detail: THERAPIES SERIES
Discharge: HOME | End: 2025-02-04

## 2025-02-04 LAB
ALBUMIN SERPL-MCNC: 4.3 G/DL (ref 3.5–5.2)
ALBUMIN/GLOB SERPL: 1.4 {RATIO} (ref 1–2.5)
ALP SERPL-CCNC: 94 U/L (ref 40–129)
ALT SERPL-CCNC: 24 U/L (ref 5–41)
ANION GAP SERPL CALCULATED.3IONS-SCNC: 11 MMOL/L (ref 9–17)
AST SERPL-CCNC: 28 U/L
BILIRUB SERPL-MCNC: 0.6 MG/DL (ref 0.3–1.2)
BNP SERPL-MCNC: 1818 PG/ML
BUN SERPL-MCNC: 38 MG/DL (ref 8–23)
BUN/CREAT SERPL: 25 (ref 9–20)
CALCIUM SERPL-MCNC: 9.6 MG/DL (ref 8.6–10.4)
CHLORIDE SERPL-SCNC: 103 MMOL/L (ref 98–107)
CO2 SERPL-SCNC: 25 MMOL/L (ref 20–31)
CREAT SERPL-MCNC: 1.5 MG/DL (ref 0.7–1.2)
ERYTHROCYTE [DISTWIDTH] IN BLOOD BY AUTOMATED COUNT: 13.1 % (ref 11.8–14.4)
GFR, ESTIMATED: 50 ML/MIN/1.73M2
GLUCOSE SERPL-MCNC: 89 MG/DL (ref 70–99)
HCT VFR BLD AUTO: 43.4 % (ref 40.7–50.3)
HGB BLD-MCNC: 14.3 G/DL (ref 13–17)
INR PPP: 2.4
LDH SERPL-CCNC: 215 U/L (ref 135–225)
MAGNESIUM SERPL-MCNC: 2.2 MG/DL (ref 1.6–2.6)
MCH RBC QN AUTO: 29.1 PG (ref 25.2–33.5)
MCHC RBC AUTO-ENTMCNC: 32.9 G/DL (ref 25.2–33.5)
MCV RBC AUTO: 88.4 FL (ref 82.6–102.9)
NRBC BLD-RTO: 0 PER 100 WBC
PLATELET # BLD AUTO: 215 K/UL (ref 138–453)
PMV BLD AUTO: 10.2 FL (ref 8.1–13.5)
POTASSIUM SERPL-SCNC: 4.8 MMOL/L (ref 3.7–5.3)
PROT SERPL-MCNC: 7.3 G/DL (ref 6.4–8.3)
PROTHROMBIN TIME: 24.6 SEC (ref 11.5–14.2)
RBC # BLD AUTO: 4.91 M/UL (ref 4.21–5.77)
SODIUM SERPL-SCNC: 139 MMOL/L (ref 135–144)
WBC OTHER # BLD: 7.6 K/UL (ref 3.5–11.3)

## 2025-02-04 PROCEDURE — 85027 COMPLETE CBC AUTOMATED: CPT

## 2025-02-04 PROCEDURE — 83615 LACTATE (LD) (LDH) ENZYME: CPT

## 2025-02-04 PROCEDURE — 36415 COLL VENOUS BLD VENIPUNCTURE: CPT

## 2025-02-04 PROCEDURE — 83735 ASSAY OF MAGNESIUM: CPT

## 2025-02-04 PROCEDURE — 83880 ASSAY OF NATRIURETIC PEPTIDE: CPT

## 2025-02-04 PROCEDURE — 85610 PROTHROMBIN TIME: CPT

## 2025-02-04 PROCEDURE — 80053 COMPREHEN METABOLIC PANEL: CPT

## 2025-02-06 ENCOUNTER — HOSPITAL ENCOUNTER (OUTPATIENT)
Dept: CARDIAC REHAB | Age: 70
Discharge: HOME | End: 2025-02-06

## 2025-02-06 PROCEDURE — 9900000065 HC CARDIAC REHAB PHASE 3 - 1 VISIT

## 2025-02-11 ENCOUNTER — HOSPITAL ENCOUNTER (OUTPATIENT)
Dept: CARDIAC REHAB | Age: 70
Discharge: HOME | End: 2025-02-11

## 2025-02-11 PROCEDURE — 9900000065 HC CARDIAC REHAB PHASE 3 - 1 VISIT

## 2025-02-13 ENCOUNTER — HOSPITAL ENCOUNTER (OUTPATIENT)
Dept: CARDIAC REHAB | Age: 70
Setting detail: THERAPIES SERIES
Discharge: HOME | End: 2025-02-13

## 2025-02-13 PROCEDURE — 9900000065 HC CARDIAC REHAB PHASE 3 - 1 VISIT

## 2025-02-18 ENCOUNTER — HOSPITAL ENCOUNTER (OUTPATIENT)
Dept: CARDIAC REHAB | Age: 70
Setting detail: THERAPIES SERIES
Discharge: HOME | End: 2025-02-18

## 2025-02-18 PROCEDURE — 9900000065 HC CARDIAC REHAB PHASE 3 - 1 VISIT

## 2025-03-27 ENCOUNTER — HOSPITAL ENCOUNTER (OUTPATIENT)
Age: 70
Discharge: HOME | End: 2025-03-27
Payer: MEDICARE

## 2025-03-27 ENCOUNTER — HOSPITAL ENCOUNTER (OUTPATIENT)
Dept: CARDIAC REHAB | Age: 70
Discharge: HOME | End: 2025-03-27

## 2025-03-27 LAB
ALBUMIN SERPL-MCNC: 4.1 G/DL (ref 3.5–5.2)
ALBUMIN/GLOB SERPL: 1.4 {RATIO} (ref 1–2.5)
ALP SERPL-CCNC: 95 U/L (ref 40–129)
ALT SERPL-CCNC: 22 U/L (ref 5–41)
ANION GAP SERPL CALCULATED.3IONS-SCNC: 8 MMOL/L (ref 9–17)
AST SERPL-CCNC: 27 U/L
BILIRUB SERPL-MCNC: 0.6 MG/DL (ref 0.3–1.2)
BNP SERPL-MCNC: 1899 PG/ML
BUN SERPL-MCNC: 29 MG/DL (ref 8–23)
BUN/CREAT SERPL: 21 (ref 9–20)
CALCIUM SERPL-MCNC: 9.4 MG/DL (ref 8.6–10.4)
CHLORIDE SERPL-SCNC: 102 MMOL/L (ref 98–107)
CO2 SERPL-SCNC: 26 MMOL/L (ref 20–31)
CREAT SERPL-MCNC: 1.4 MG/DL (ref 0.7–1.2)
ERYTHROCYTE [DISTWIDTH] IN BLOOD BY AUTOMATED COUNT: 12.8 % (ref 11.8–14.4)
GFR, ESTIMATED: 54 ML/MIN/1.73M2
GLUCOSE SERPL-MCNC: 91 MG/DL (ref 70–99)
HCT VFR BLD AUTO: 43.2 % (ref 40.7–50.3)
HGB BLD-MCNC: 14.3 G/DL (ref 13–17)
INR PPP: 1.6
LDH SERPL-CCNC: 206 U/L (ref 135–225)
MAGNESIUM SERPL-MCNC: 2.2 MG/DL (ref 1.6–2.6)
MCH RBC QN AUTO: 29.4 PG (ref 25.2–33.5)
MCHC RBC AUTO-ENTMCNC: 33.1 G/DL (ref 25.2–33.5)
MCV RBC AUTO: 88.7 FL (ref 82.6–102.9)
NRBC BLD-RTO: 0 PER 100 WBC
PLATELET # BLD AUTO: 206 K/UL (ref 138–453)
PMV BLD AUTO: 9.6 FL (ref 8.1–13.5)
POTASSIUM SERPL-SCNC: 5.1 MMOL/L (ref 3.7–5.3)
PROT SERPL-MCNC: 7.1 G/DL (ref 6.4–8.3)
PROTHROMBIN TIME: 19.9 SEC (ref 11.5–14.2)
RBC # BLD AUTO: 4.87 M/UL (ref 4.21–5.77)
SODIUM SERPL-SCNC: 136 MMOL/L (ref 135–144)
WBC OTHER # BLD: 7.9 K/UL (ref 3.5–11.3)

## 2025-03-27 PROCEDURE — 83735 ASSAY OF MAGNESIUM: CPT

## 2025-03-27 PROCEDURE — 85610 PROTHROMBIN TIME: CPT

## 2025-03-27 PROCEDURE — 36415 COLL VENOUS BLD VENIPUNCTURE: CPT

## 2025-03-27 PROCEDURE — 85027 COMPLETE CBC AUTOMATED: CPT

## 2025-03-27 PROCEDURE — 83615 LACTATE (LD) (LDH) ENZYME: CPT

## 2025-03-27 PROCEDURE — 83880 ASSAY OF NATRIURETIC PEPTIDE: CPT

## 2025-03-27 PROCEDURE — 80053 COMPREHEN METABOLIC PANEL: CPT

## 2025-04-01 ENCOUNTER — HOSPITAL ENCOUNTER (OUTPATIENT)
Dept: CARDIAC REHAB | Age: 70
Setting detail: THERAPIES SERIES
Discharge: HOME | End: 2025-04-01

## 2025-04-01 PROCEDURE — 9900000065 HC CARDIAC REHAB PHASE 3 - 1 VISIT

## 2025-04-03 ENCOUNTER — HOSPITAL ENCOUNTER (OUTPATIENT)
Dept: CARDIAC REHAB | Age: 70
Discharge: HOME | End: 2025-04-03

## 2025-04-03 PROCEDURE — 9900000065 HC CARDIAC REHAB PHASE 3 - 1 VISIT

## 2025-04-08 ENCOUNTER — HOSPITAL ENCOUNTER (OUTPATIENT)
Dept: CARDIAC REHAB | Age: 70
Setting detail: THERAPIES SERIES
Discharge: HOME | End: 2025-04-08

## 2025-04-08 ENCOUNTER — HOSPITAL ENCOUNTER (OUTPATIENT)
Age: 70
Discharge: HOME | End: 2025-04-08
Payer: MEDICARE

## 2025-04-08 LAB
ALBUMIN SERPL-MCNC: 4.3 G/DL (ref 3.5–5.2)
ALBUMIN/GLOB SERPL: 1.5 {RATIO} (ref 1–2.5)
ALP SERPL-CCNC: 88 U/L (ref 40–129)
ALT SERPL-CCNC: 26 U/L (ref 10–50)
ANION GAP SERPL CALCULATED.3IONS-SCNC: 9 MMOL/L (ref 9–16)
AST SERPL-CCNC: 31 U/L (ref 10–50)
BILIRUB SERPL-MCNC: 0.7 MG/DL (ref 0–1.2)
BNP SERPL-MCNC: 1828 PG/ML (ref 0–125)
BUN SERPL-MCNC: 34 MG/DL (ref 8–23)
BUN/CREAT SERPL: 24 (ref 9–20)
CALCIUM SERPL-MCNC: 9.8 MG/DL (ref 8.6–10.4)
CHLORIDE SERPL-SCNC: 102 MMOL/L (ref 98–107)
CO2 SERPL-SCNC: 27 MMOL/L (ref 20–31)
CREAT SERPL-MCNC: 1.4 MG/DL (ref 0.7–1.2)
ERYTHROCYTE [DISTWIDTH] IN BLOOD BY AUTOMATED COUNT: 12.9 % (ref 11.8–14.4)
GFR, ESTIMATED: 54 ML/MIN/1.73M2
GLUCOSE SERPL-MCNC: 94 MG/DL (ref 74–99)
HCT VFR BLD AUTO: 43.4 % (ref 40.7–50.3)
HGB BLD-MCNC: 14.7 G/DL (ref 13–17)
INR PPP: 1.7
LDH SERPL-CCNC: 202 U/L (ref 135–225)
MAGNESIUM SERPL-MCNC: 2.3 MG/DL (ref 1.6–2.4)
MCH RBC QN AUTO: 29.5 PG (ref 25.2–33.5)
MCHC RBC AUTO-ENTMCNC: 33.9 G/DL (ref 25.2–33.5)
MCV RBC AUTO: 87.1 FL (ref 82.6–102.9)
NRBC BLD-RTO: 0 PER 100 WBC
PLATELET # BLD AUTO: 192 K/UL (ref 138–453)
PMV BLD AUTO: 9.5 FL (ref 8.1–13.5)
POTASSIUM SERPL-SCNC: 4.7 MMOL/L (ref 3.7–5.3)
PROT SERPL-MCNC: 7.2 G/DL (ref 6.6–8.7)
PROTHROMBIN TIME: 20.2 SEC (ref 11.5–14.2)
RBC # BLD AUTO: 4.98 M/UL (ref 4.21–5.77)
SODIUM SERPL-SCNC: 138 MMOL/L (ref 136–145)
WBC OTHER # BLD: 8.9 K/UL (ref 3.5–11.3)

## 2025-04-08 PROCEDURE — 80053 COMPREHEN METABOLIC PANEL: CPT

## 2025-04-08 PROCEDURE — 36415 COLL VENOUS BLD VENIPUNCTURE: CPT

## 2025-04-08 PROCEDURE — 9900000065 HC CARDIAC REHAB PHASE 3 - 1 VISIT

## 2025-04-08 PROCEDURE — 85610 PROTHROMBIN TIME: CPT

## 2025-04-08 PROCEDURE — 85027 COMPLETE CBC AUTOMATED: CPT

## 2025-04-08 PROCEDURE — 83880 ASSAY OF NATRIURETIC PEPTIDE: CPT

## 2025-04-08 PROCEDURE — 83615 LACTATE (LD) (LDH) ENZYME: CPT

## 2025-04-08 PROCEDURE — 83735 ASSAY OF MAGNESIUM: CPT

## 2025-04-10 ENCOUNTER — OFFICE VISIT (OUTPATIENT)
Dept: INTERNAL MEDICINE | Age: 70
End: 2025-04-10
Payer: MEDICARE

## 2025-04-10 ENCOUNTER — HOSPITAL ENCOUNTER (OUTPATIENT)
Dept: CARDIAC REHAB | Age: 70
Setting detail: THERAPIES SERIES
Discharge: HOME | End: 2025-04-10

## 2025-04-10 VITALS — BODY MASS INDEX: 27.59 KG/M2 | HEIGHT: 74 IN | WEIGHT: 215 LBS | HEART RATE: 68 BPM

## 2025-04-10 DIAGNOSIS — Z00.00 GENERAL MEDICAL EXAM: ICD-10-CM

## 2025-04-10 DIAGNOSIS — Z00.00 MEDICARE ANNUAL WELLNESS VISIT, SUBSEQUENT: Primary | ICD-10-CM

## 2025-04-10 DIAGNOSIS — Z12.5 SPECIAL SCREENING FOR MALIGNANT NEOPLASM OF PROSTATE: ICD-10-CM

## 2025-04-10 DIAGNOSIS — I25.83 CORONARY ARTERY DISEASE DUE TO LIPID RICH PLAQUE: ICD-10-CM

## 2025-04-10 DIAGNOSIS — I25.10 CORONARY ARTERY DISEASE DUE TO LIPID RICH PLAQUE: ICD-10-CM

## 2025-04-10 DIAGNOSIS — E78.49 OTHER HYPERLIPIDEMIA: ICD-10-CM

## 2025-04-10 DIAGNOSIS — I50.22 CHRONIC SYSTOLIC CONGESTIVE HEART FAILURE (HCC): ICD-10-CM

## 2025-04-10 DIAGNOSIS — Z95.811 LEFT VENTRICULAR ASSIST DEVICE PRESENT (HCC): ICD-10-CM

## 2025-04-10 PROCEDURE — 9900000065 HC CARDIAC REHAB PHASE 3 - 1 VISIT

## 2025-04-10 PROCEDURE — 99397 PER PM REEVAL EST PAT 65+ YR: CPT | Performed by: INTERNAL MEDICINE

## 2025-04-10 SDOH — ECONOMIC STABILITY: FOOD INSECURITY: WITHIN THE PAST 12 MONTHS, THE FOOD YOU BOUGHT JUST DIDN'T LAST AND YOU DIDN'T HAVE MONEY TO GET MORE.: NEVER TRUE

## 2025-04-10 SDOH — ECONOMIC STABILITY: FOOD INSECURITY: WITHIN THE PAST 12 MONTHS, YOU WORRIED THAT YOUR FOOD WOULD RUN OUT BEFORE YOU GOT MONEY TO BUY MORE.: NEVER TRUE

## 2025-04-10 ASSESSMENT — ENCOUNTER SYMPTOMS
VOMITING: 0
BLOOD IN STOOL: 0
BACK PAIN: 0
CONSTIPATION: 0
COUGH: 0
SHORTNESS OF BREATH: 0
EYE PAIN: 0
DIARRHEA: 0
ABDOMINAL PAIN: 0
NAUSEA: 0

## 2025-04-10 ASSESSMENT — LIFESTYLE VARIABLES
HOW MANY STANDARD DRINKS CONTAINING ALCOHOL DO YOU HAVE ON A TYPICAL DAY: PATIENT DOES NOT DRINK
HOW OFTEN DO YOU HAVE A DRINK CONTAINING ALCOHOL: NEVER

## 2025-04-10 ASSESSMENT — PATIENT HEALTH QUESTIONNAIRE - PHQ9
SUM OF ALL RESPONSES TO PHQ QUESTIONS 1-9: 0
2. FEELING DOWN, DEPRESSED OR HOPELESS: NOT AT ALL
1. LITTLE INTEREST OR PLEASURE IN DOING THINGS: NOT AT ALL

## 2025-04-10 NOTE — PROGRESS NOTES
CareTeam (Including outside providers/suppliers regularly involved in providing care):   Patient Care Team:  Abraham Mcallister MD as PCP - General (Internal Medicine)  Abraham Mcallister MD as PCP - Empaneled Provider     Recommendations for Preventive Services Due: see orders and patient instructions/AVS.  Recommended screening schedule for the next 5-10 years is provided to the patient in written form: see Patient Instructions/AVS.     Reviewed and updated this visit:  Tobacco  Allergies  Meds  Problems  Med Hx  Surg Hx  Fam Hx  Sexual   Hx               I, Dr. Mcallister, directly supervised the performance of this Medicare annual wellness visit.     
artery disease due to lipid rich plaque  ESTABLISHED, MOD MDM, 30-39 MIN [78220]      6. General medical exam        7. Special screening for malignant neoplasm of prostate  PSA Screening    ESTABLISHED, MOD MDM, 30-39 MIN [[id number]]         I reviewed multiple test results.    4/8/2025 low INR at 1.7.  This is being adjusted by the [facility]    4/8/2025 normal glucose at 94    2/26/2024 low total cholesterol at 50.    Patient told to continue Coumadin       Plan:    Assessment & Plan   Return in about 27 weeks (around 10/16/2025) for CHF, Hyperlipidemia, CAD.    Orders Placed This Encounter   Procedures    PSA Screening     Standing Status:   Future     Expected Date:   4/17/2025     Expiration Date:   4/10/2026    Comprehensive Metabolic Panel     Standing Status:   Future     Expected Date:   4/17/2025     Expiration Date:   4/10/2026    Lipid, Fasting     Standing Status:   Future     Expected Date:   4/17/2025     Expiration Date:   4/10/2026    ESTABLISHED, MOD MDM, 30-39 MIN [[id number]]     No orders of the defined types were placed in this encounter.      Patientgiven educational materials - see patient instructions.  Discussed use, benefit,and side effects of prescribed medications.  All patient questions answered. Ptvoiced understanding. Reviewed health maintenance.  Instructed to continue currentmedications, diet and exercise.  Patient agreed with treatment plan. Follow up asdirected.     Electronically signed by Abraham Mcallister MD on 4/10/2025 at 9:17 AM

## 2025-04-10 NOTE — PATIENT INSTRUCTIONS
healthcare professional. [facility], [facility], disclaims any warranty or liability for your use of this information.    Personalized Preventive Plan for Javier Bean - 4/10/2025  Medicare offers a range of preventive health benefits. Some of the tests and screenings are paid in full while other may be subject to a deductible, co-insurance, and/or copay.  Some of these benefits include a comprehensive review of your medical history including lifestyle, illnesses that may run in your family, and various assessments and screenings as appropriate.  After reviewing your medical record and screening and assessments performed today your provider may have ordered immunizations, labs, imaging, and/or referrals for you.  A list of these orders (if applicable) as well as your Preventive Care list are included within your After Visit Summary for your review.

## 2025-04-15 ENCOUNTER — HOSPITAL ENCOUNTER (OUTPATIENT)
Dept: CARDIAC REHAB | Age: 70
Setting detail: THERAPIES SERIES
Discharge: HOME | End: 2025-04-15

## 2025-04-15 PROCEDURE — 9900000065 HC CARDIAC REHAB PHASE 3 - 1 VISIT

## 2025-04-16 DIAGNOSIS — I50.22 CHRONIC SYSTOLIC CONGESTIVE HEART FAILURE (HCC): ICD-10-CM

## 2025-04-16 RX ORDER — FUROSEMIDE 20 MG/1
20 TABLET ORAL DAILY
Qty: 90 TABLET | Refills: 3 | OUTPATIENT
Start: 2025-04-16

## 2025-04-16 NOTE — TELEPHONE ENCOUNTER
Spoke with pt- states he is no longer taking this medication.  Request refused as \"medication discontinued.\"

## 2025-04-16 NOTE — TELEPHONE ENCOUNTER
Lasix was removed from med list on 4/10/25 as \"list cleanup\" by Dr. Mcallister.  Will call pt later to confirm request.

## 2025-04-17 ENCOUNTER — HOSPITAL ENCOUNTER (OUTPATIENT)
Age: 70
Discharge: HOME | End: 2025-04-17
Payer: MEDICARE

## 2025-04-17 ENCOUNTER — RESULTS FOLLOW-UP (OUTPATIENT)
Dept: INTERNAL MEDICINE | Age: 70
End: 2025-04-17

## 2025-04-17 ENCOUNTER — HOSPITAL ENCOUNTER (OUTPATIENT)
Dept: CARDIAC REHAB | Age: 70
Setting detail: THERAPIES SERIES
Discharge: HOME | End: 2025-04-17

## 2025-04-17 DIAGNOSIS — E78.49 OTHER HYPERLIPIDEMIA: ICD-10-CM

## 2025-04-17 DIAGNOSIS — Z12.5 SPECIAL SCREENING FOR MALIGNANT NEOPLASM OF PROSTATE: ICD-10-CM

## 2025-04-17 DIAGNOSIS — I50.22 CHRONIC SYSTOLIC CONGESTIVE HEART FAILURE (HCC): ICD-10-CM

## 2025-04-17 LAB
ALBUMIN SERPL-MCNC: 4.3 G/DL (ref 3.5–5.2)
ALBUMIN SERPL-MCNC: 4.3 G/DL (ref 3.5–5.2)
ALBUMIN/GLOB SERPL: 1.5 {RATIO} (ref 1–2.5)
ALBUMIN/GLOB SERPL: 1.5 {RATIO} (ref 1–2.5)
ALP SERPL-CCNC: 96 U/L (ref 40–129)
ALP SERPL-CCNC: 96 U/L (ref 40–129)
ALT SERPL-CCNC: 22 U/L (ref 10–50)
ALT SERPL-CCNC: 22 U/L (ref 10–50)
ANION GAP SERPL CALCULATED.3IONS-SCNC: 13 MMOL/L (ref 9–16)
ANION GAP SERPL CALCULATED.3IONS-SCNC: 13 MMOL/L (ref 9–16)
AST SERPL-CCNC: 26 U/L (ref 10–50)
AST SERPL-CCNC: 26 U/L (ref 10–50)
BILIRUB SERPL-MCNC: 0.8 MG/DL (ref 0–1.2)
BILIRUB SERPL-MCNC: 0.8 MG/DL (ref 0–1.2)
BNP SERPL-MCNC: 2299 PG/ML (ref 0–125)
BUN SERPL-MCNC: 27 MG/DL (ref 8–23)
BUN SERPL-MCNC: 27 MG/DL (ref 8–23)
BUN/CREAT SERPL: 21 (ref 9–20)
BUN/CREAT SERPL: 21 (ref 9–20)
CALCIUM SERPL-MCNC: 9.5 MG/DL (ref 8.6–10.4)
CALCIUM SERPL-MCNC: 9.5 MG/DL (ref 8.6–10.4)
CHLORIDE SERPL-SCNC: 103 MMOL/L (ref 98–107)
CHLORIDE SERPL-SCNC: 103 MMOL/L (ref 98–107)
CHOLEST SERPL-MCNC: 120 MG/DL (ref 0–199)
CO2 SERPL-SCNC: 23 MMOL/L (ref 20–31)
CO2 SERPL-SCNC: 23 MMOL/L (ref 20–31)
CREAT SERPL-MCNC: 1.3 MG/DL (ref 0.7–1.2)
CREAT SERPL-MCNC: 1.3 MG/DL (ref 0.7–1.2)
ERYTHROCYTE [DISTWIDTH] IN BLOOD BY AUTOMATED COUNT: 12.9 % (ref 11.8–14.4)
GFR, ESTIMATED: 59 ML/MIN/1.73M2
GFR, ESTIMATED: 59 ML/MIN/1.73M2
GLUCOSE SERPL-MCNC: 98 MG/DL (ref 74–99)
GLUCOSE SERPL-MCNC: 98 MG/DL (ref 74–99)
HCT VFR BLD AUTO: 43.7 % (ref 40.7–50.3)
HDLC SERPL-MCNC: 46 MG/DL
HGB BLD-MCNC: 14.6 G/DL (ref 13–17)
INR PPP: 2.1
LDH SERPL-CCNC: 200 U/L (ref 135–225)
LDLC SERPL CALC-MCNC: 53 MG/DL (ref 0–100)
MAGNESIUM SERPL-MCNC: 2.2 MG/DL (ref 1.6–2.4)
MCH RBC QN AUTO: 29.1 PG (ref 25.2–33.5)
MCHC RBC AUTO-ENTMCNC: 33.4 G/DL (ref 25.2–33.5)
MCV RBC AUTO: 87.2 FL (ref 82.6–102.9)
NRBC BLD-RTO: 0 PER 100 WBC
PLATELET # BLD AUTO: 195 K/UL (ref 138–453)
PMV BLD AUTO: 10.1 FL (ref 8.1–13.5)
POTASSIUM SERPL-SCNC: 4.9 MMOL/L (ref 3.7–5.3)
POTASSIUM SERPL-SCNC: 4.9 MMOL/L (ref 3.7–5.3)
PROT SERPL-MCNC: 7.1 G/DL (ref 6.6–8.7)
PROT SERPL-MCNC: 7.1 G/DL (ref 6.6–8.7)
PROTHROMBIN TIME: 24 SEC (ref 11.5–14.2)
PSA SERPL-MCNC: 1.02 NG/ML (ref 0–4)
RBC # BLD AUTO: 5.01 M/UL (ref 4.21–5.77)
SODIUM SERPL-SCNC: 139 MMOL/L (ref 136–145)
SODIUM SERPL-SCNC: 139 MMOL/L (ref 136–145)
TRIGL SERPL-MCNC: 105 MG/DL (ref 0–149)
VLDLC SERPL CALC-MCNC: 21 MG/DL (ref 1–30)
WBC OTHER # BLD: 8.3 K/UL (ref 3.5–11.3)

## 2025-04-17 PROCEDURE — G0103 PSA SCREENING: HCPCS

## 2025-04-17 PROCEDURE — 83880 ASSAY OF NATRIURETIC PEPTIDE: CPT

## 2025-04-17 PROCEDURE — 83735 ASSAY OF MAGNESIUM: CPT

## 2025-04-17 PROCEDURE — 9900000065 HC CARDIAC REHAB PHASE 3 - 1 VISIT

## 2025-04-17 PROCEDURE — 80053 COMPREHEN METABOLIC PANEL: CPT

## 2025-04-17 PROCEDURE — 80061 LIPID PANEL: CPT

## 2025-04-17 PROCEDURE — 36415 COLL VENOUS BLD VENIPUNCTURE: CPT

## 2025-04-17 PROCEDURE — 85027 COMPLETE CBC AUTOMATED: CPT

## 2025-04-17 PROCEDURE — 83615 LACTATE (LD) (LDH) ENZYME: CPT

## 2025-04-17 PROCEDURE — 85610 PROTHROMBIN TIME: CPT

## 2025-04-22 ENCOUNTER — HOSPITAL ENCOUNTER (OUTPATIENT)
Dept: CARDIAC REHAB | Age: 70
Setting detail: THERAPIES SERIES
Discharge: HOME | End: 2025-04-22

## 2025-04-22 PROCEDURE — 9900000065 HC CARDIAC REHAB PHASE 3 - 1 VISIT

## 2025-04-24 ENCOUNTER — HOSPITAL ENCOUNTER (OUTPATIENT)
Dept: CARDIAC REHAB | Age: 70
Setting detail: THERAPIES SERIES
Discharge: HOME | End: 2025-04-24

## 2025-04-24 PROCEDURE — 9900000065 HC CARDIAC REHAB PHASE 3 - 1 VISIT

## 2025-04-29 ENCOUNTER — HOSPITAL ENCOUNTER (OUTPATIENT)
Age: 70
Discharge: HOME | End: 2025-04-29
Payer: MEDICARE

## 2025-04-29 ENCOUNTER — HOSPITAL ENCOUNTER (OUTPATIENT)
Dept: CARDIAC REHAB | Age: 70
Discharge: HOME | End: 2025-04-29

## 2025-04-29 LAB
ALBUMIN SERPL-MCNC: 4.2 G/DL (ref 3.5–5.2)
ALBUMIN/GLOB SERPL: 1.4 {RATIO} (ref 1–2.5)
ALP SERPL-CCNC: 110 U/L (ref 40–129)
ALT SERPL-CCNC: 22 U/L (ref 10–50)
ANION GAP SERPL CALCULATED.3IONS-SCNC: 14 MMOL/L (ref 9–16)
AST SERPL-CCNC: 28 U/L (ref 10–50)
BASOPHILS # BLD: 0.05 K/UL (ref 0–0.2)
BASOPHILS NFR BLD: 1 % (ref 0–2)
BILIRUB SERPL-MCNC: 0.5 MG/DL (ref 0–1.2)
BNP SERPL-MCNC: 1150 PG/ML (ref 0–125)
BUN SERPL-MCNC: 27 MG/DL (ref 8–23)
BUN/CREAT SERPL: 23 (ref 9–20)
CALCIUM SERPL-MCNC: 9.4 MG/DL (ref 8.6–10.4)
CHLORIDE SERPL-SCNC: 102 MMOL/L (ref 98–107)
CO2 SERPL-SCNC: 22 MMOL/L (ref 20–31)
CREAT SERPL-MCNC: 1.2 MG/DL (ref 0.7–1.2)
EOSINOPHIL # BLD: 0.26 K/UL (ref 0–0.44)
EOSINOPHILS RELATIVE PERCENT: 3 % (ref 1–4)
ERYTHROCYTE [DISTWIDTH] IN BLOOD BY AUTOMATED COUNT: 12.9 % (ref 11.8–14.4)
GFR, ESTIMATED: 65 ML/MIN/1.73M2
GLUCOSE SERPL-MCNC: 93 MG/DL (ref 74–99)
HCT VFR BLD AUTO: 44.1 % (ref 40.7–50.3)
HGB BLD-MCNC: 14.8 G/DL (ref 13–17)
IMM GRANULOCYTES # BLD AUTO: <0.03 K/UL (ref 0–0.3)
IMM GRANULOCYTES NFR BLD: 0 %
INR PPP: 2.4
LDH SERPL-CCNC: 205 U/L (ref 135–225)
LYMPHOCYTES NFR BLD: 1.52 K/UL (ref 1.1–3.7)
LYMPHOCYTES RELATIVE PERCENT: 18 % (ref 24–43)
MAGNESIUM SERPL-MCNC: 2.2 MG/DL (ref 1.6–2.4)
MCH RBC QN AUTO: 29.1 PG (ref 25.2–33.5)
MCHC RBC AUTO-ENTMCNC: 33.6 G/DL (ref 25.2–33.5)
MCV RBC AUTO: 86.8 FL (ref 82.6–102.9)
MONOCYTES NFR BLD: 0.9 K/UL (ref 0.1–1.2)
MONOCYTES NFR BLD: 11 % (ref 3–12)
NEUTROPHILS NFR BLD: 67 % (ref 36–65)
NEUTS SEG NFR BLD: 5.63 K/UL (ref 1.5–8.1)
NRBC BLD-RTO: 0 PER 100 WBC
PLATELET # BLD AUTO: 222 K/UL (ref 138–453)
PMV BLD AUTO: 9.7 FL (ref 8.1–13.5)
POTASSIUM SERPL-SCNC: 5.2 MMOL/L (ref 3.7–5.3)
PROT SERPL-MCNC: 7.2 G/DL (ref 6.6–8.7)
PROTHROMBIN TIME: 26.6 SEC (ref 11.5–14.2)
RBC # BLD AUTO: 5.08 M/UL (ref 4.21–5.77)
SODIUM SERPL-SCNC: 138 MMOL/L (ref 136–145)
WBC OTHER # BLD: 8.4 K/UL (ref 3.5–11.3)

## 2025-04-29 PROCEDURE — 83615 LACTATE (LD) (LDH) ENZYME: CPT

## 2025-04-29 PROCEDURE — 9900000065 HC CARDIAC REHAB PHASE 3 - 1 VISIT

## 2025-04-29 PROCEDURE — 85025 COMPLETE CBC W/AUTO DIFF WBC: CPT

## 2025-04-29 PROCEDURE — 36415 COLL VENOUS BLD VENIPUNCTURE: CPT

## 2025-04-29 PROCEDURE — 85610 PROTHROMBIN TIME: CPT

## 2025-04-29 PROCEDURE — 80053 COMPREHEN METABOLIC PANEL: CPT

## 2025-04-29 PROCEDURE — 83735 ASSAY OF MAGNESIUM: CPT

## 2025-04-29 PROCEDURE — 83880 ASSAY OF NATRIURETIC PEPTIDE: CPT

## 2025-05-01 ENCOUNTER — HOSPITAL ENCOUNTER (OUTPATIENT)
Dept: CARDIAC REHAB | Age: 70
Setting detail: THERAPIES SERIES
Discharge: HOME | End: 2025-05-01

## 2025-05-06 ENCOUNTER — HOSPITAL ENCOUNTER (OUTPATIENT)
Dept: CARDIAC REHAB | Age: 70
Discharge: HOME | End: 2025-05-06

## 2025-05-06 PROCEDURE — 9900000065 HC CARDIAC REHAB PHASE 3 - 1 VISIT

## 2025-05-08 ENCOUNTER — HOSPITAL ENCOUNTER (OUTPATIENT)
Dept: CARDIAC REHAB | Age: 70
Discharge: HOME | End: 2025-05-08

## 2025-05-08 PROCEDURE — 9900000065 HC CARDIAC REHAB PHASE 3 - 1 VISIT

## 2025-05-13 ENCOUNTER — HOSPITAL ENCOUNTER (OUTPATIENT)
Dept: CARDIAC REHAB | Age: 70
Setting detail: THERAPIES SERIES
Discharge: HOME | End: 2025-05-13

## 2025-05-13 PROCEDURE — 9900000065 HC CARDIAC REHAB PHASE 3 - 1 VISIT

## 2025-05-15 ENCOUNTER — HOSPITAL ENCOUNTER (OUTPATIENT)
Age: 70
Discharge: HOME | End: 2025-05-15
Payer: MEDICARE

## 2025-05-15 ENCOUNTER — HOSPITAL ENCOUNTER (OUTPATIENT)
Dept: CARDIAC REHAB | Age: 70
Setting detail: THERAPIES SERIES
Discharge: HOME | End: 2025-05-15

## 2025-05-15 LAB
ALBUMIN SERPL-MCNC: 4.2 G/DL (ref 3.5–5.2)
ALBUMIN/GLOB SERPL: 1.5 {RATIO} (ref 1–2.5)
ALP SERPL-CCNC: 96 U/L (ref 40–129)
ALT SERPL-CCNC: 21 U/L (ref 10–50)
ANION GAP SERPL CALCULATED.3IONS-SCNC: 11 MMOL/L (ref 9–16)
AST SERPL-CCNC: 27 U/L (ref 10–50)
BASOPHILS # BLD: 0.05 K/UL (ref 0–0.2)
BASOPHILS NFR BLD: 1 % (ref 0–2)
BILIRUB SERPL-MCNC: 0.5 MG/DL (ref 0–1.2)
BNP SERPL-MCNC: 1361 PG/ML (ref 0–125)
BUN SERPL-MCNC: 32 MG/DL (ref 8–23)
BUN/CREAT SERPL: 25 (ref 9–20)
CALCIUM SERPL-MCNC: 9.5 MG/DL (ref 8.6–10.4)
CHLORIDE SERPL-SCNC: 103 MMOL/L (ref 98–107)
CO2 SERPL-SCNC: 25 MMOL/L (ref 20–31)
CREAT SERPL-MCNC: 1.3 MG/DL (ref 0.7–1.2)
EOSINOPHIL # BLD: 0.18 K/UL (ref 0–0.44)
EOSINOPHILS RELATIVE PERCENT: 2 % (ref 1–4)
ERYTHROCYTE [DISTWIDTH] IN BLOOD BY AUTOMATED COUNT: 13 % (ref 11.8–14.4)
GFR, ESTIMATED: 59 ML/MIN/1.73M2
GLUCOSE SERPL-MCNC: 80 MG/DL (ref 74–99)
HCT VFR BLD AUTO: 42.9 % (ref 40.7–50.3)
HGB BLD-MCNC: 14.3 G/DL (ref 13–17)
IMM GRANULOCYTES # BLD AUTO: <0.03 K/UL (ref 0–0.3)
IMM GRANULOCYTES NFR BLD: 0 %
INR PPP: 2.2
LDH SERPL-CCNC: 190 U/L (ref 135–225)
LYMPHOCYTES NFR BLD: 1.47 K/UL (ref 1.1–3.7)
LYMPHOCYTES RELATIVE PERCENT: 19 % (ref 24–43)
MAGNESIUM SERPL-MCNC: 2.2 MG/DL (ref 1.6–2.4)
MCH RBC QN AUTO: 29.1 PG (ref 25.2–33.5)
MCHC RBC AUTO-ENTMCNC: 33.3 G/DL (ref 25.2–33.5)
MCV RBC AUTO: 87.2 FL (ref 82.6–102.9)
MONOCYTES NFR BLD: 1 K/UL (ref 0.1–1.2)
MONOCYTES NFR BLD: 13 % (ref 3–12)
NEUTROPHILS NFR BLD: 65 % (ref 36–65)
NEUTS SEG NFR BLD: 4.88 K/UL (ref 1.5–8.1)
NRBC BLD-RTO: 0 PER 100 WBC
PLATELET # BLD AUTO: 204 K/UL (ref 138–453)
PMV BLD AUTO: 9.6 FL (ref 8.1–13.5)
POTASSIUM SERPL-SCNC: 4.9 MMOL/L (ref 3.7–5.3)
PROT SERPL-MCNC: 7 G/DL (ref 6.6–8.7)
PROTHROMBIN TIME: 25.1 SEC (ref 11.5–14.2)
RBC # BLD AUTO: 4.92 M/UL (ref 4.21–5.77)
SODIUM SERPL-SCNC: 139 MMOL/L (ref 136–145)
WBC OTHER # BLD: 7.6 K/UL (ref 3.5–11.3)

## 2025-05-15 PROCEDURE — 80053 COMPREHEN METABOLIC PANEL: CPT

## 2025-05-15 PROCEDURE — 9900000065 HC CARDIAC REHAB PHASE 3 - 1 VISIT

## 2025-05-15 PROCEDURE — 36415 COLL VENOUS BLD VENIPUNCTURE: CPT

## 2025-05-15 PROCEDURE — 85610 PROTHROMBIN TIME: CPT

## 2025-05-15 PROCEDURE — 83615 LACTATE (LD) (LDH) ENZYME: CPT

## 2025-05-15 PROCEDURE — 85025 COMPLETE CBC W/AUTO DIFF WBC: CPT

## 2025-05-15 PROCEDURE — 83880 ASSAY OF NATRIURETIC PEPTIDE: CPT

## 2025-05-15 PROCEDURE — 83735 ASSAY OF MAGNESIUM: CPT

## 2025-05-22 ENCOUNTER — HOSPITAL ENCOUNTER (OUTPATIENT)
Dept: CARDIAC REHAB | Age: 70
Discharge: HOME | End: 2025-05-22

## 2025-05-22 PROCEDURE — 9900000065 HC CARDIAC REHAB PHASE 3 - 1 VISIT

## 2025-05-27 ENCOUNTER — HOSPITAL ENCOUNTER (OUTPATIENT)
Dept: CARDIAC REHAB | Age: 70
Discharge: HOME | End: 2025-05-27

## 2025-05-27 PROCEDURE — 9900000065 HC CARDIAC REHAB PHASE 3 - 1 VISIT

## 2025-05-29 ENCOUNTER — HOSPITAL ENCOUNTER (OUTPATIENT)
Dept: CARDIAC REHAB | Age: 70
Discharge: HOME | End: 2025-05-29

## 2025-05-29 PROCEDURE — 9900000065 HC CARDIAC REHAB PHASE 3 - 1 VISIT

## 2025-06-03 ENCOUNTER — HOSPITAL ENCOUNTER (OUTPATIENT)
Dept: CARDIAC REHAB | Age: 70
Discharge: HOME | End: 2025-06-03

## 2025-06-03 ENCOUNTER — HOSPITAL ENCOUNTER (OUTPATIENT)
Age: 70
Discharge: HOME | End: 2025-06-03
Payer: MEDICARE

## 2025-06-03 LAB
ALBUMIN SERPL-MCNC: 4.3 G/DL (ref 3.5–5.2)
ALBUMIN/GLOB SERPL: 1.4 {RATIO} (ref 1–2.5)
ALP SERPL-CCNC: 93 U/L (ref 40–129)
ALT SERPL-CCNC: 20 U/L (ref 5–41)
ANION GAP SERPL CALCULATED.3IONS-SCNC: 9 MMOL/L (ref 9–17)
AST SERPL-CCNC: 24 U/L
BILIRUB SERPL-MCNC: 0.8 MG/DL (ref 0.3–1.2)
BNP SERPL-MCNC: 1290 PG/ML (ref 0–125)
BUN SERPL-MCNC: 34 MG/DL (ref 8–23)
BUN/CREAT SERPL: 26 (ref 9–20)
CALCIUM SERPL-MCNC: 9.7 MG/DL (ref 8.6–10.4)
CHLORIDE SERPL-SCNC: 101 MMOL/L (ref 98–107)
CO2 SERPL-SCNC: 26 MMOL/L (ref 20–31)
CREAT SERPL-MCNC: 1.3 MG/DL (ref 0.7–1.2)
ERYTHROCYTE [DISTWIDTH] IN BLOOD BY AUTOMATED COUNT: 13 % (ref 11.8–14.4)
GFR, ESTIMATED: 59 ML/MIN/1.73M2
GLUCOSE SERPL-MCNC: 95 MG/DL (ref 70–99)
HCT VFR BLD AUTO: 43 % (ref 40.7–50.3)
HGB BLD-MCNC: 14.6 G/DL (ref 13–17)
INR PPP: 1.9
LDH SERPL-CCNC: 200 U/L (ref 135–225)
MAGNESIUM SERPL-MCNC: 2.1 MG/DL (ref 1.6–2.6)
MCH RBC QN AUTO: 29.7 PG (ref 25.2–33.5)
MCHC RBC AUTO-ENTMCNC: 34 G/DL (ref 25.2–33.5)
MCV RBC AUTO: 87.4 FL (ref 82.6–102.9)
NRBC BLD-RTO: 0 PER 100 WBC
PLATELET # BLD AUTO: 213 K/UL (ref 138–453)
PMV BLD AUTO: 10 FL (ref 8.1–13.5)
POTASSIUM SERPL-SCNC: 5.1 MMOL/L (ref 3.7–5.3)
PROT SERPL-MCNC: 7.4 G/DL (ref 6.4–8.3)
PROTHROMBIN TIME: 22.5 SEC (ref 11.5–14.2)
RBC # BLD AUTO: 4.92 M/UL (ref 4.21–5.77)
SODIUM SERPL-SCNC: 136 MMOL/L (ref 135–144)
WBC OTHER # BLD: 7.7 K/UL (ref 3.5–11.3)

## 2025-06-03 PROCEDURE — 36415 COLL VENOUS BLD VENIPUNCTURE: CPT

## 2025-06-03 PROCEDURE — 9900000065 HC CARDIAC REHAB PHASE 3 - 1 VISIT

## 2025-06-03 PROCEDURE — 83615 LACTATE (LD) (LDH) ENZYME: CPT

## 2025-06-03 PROCEDURE — 85027 COMPLETE CBC AUTOMATED: CPT

## 2025-06-03 PROCEDURE — 80053 COMPREHEN METABOLIC PANEL: CPT

## 2025-06-03 PROCEDURE — 83735 ASSAY OF MAGNESIUM: CPT

## 2025-06-03 PROCEDURE — 83880 ASSAY OF NATRIURETIC PEPTIDE: CPT

## 2025-06-03 PROCEDURE — 85610 PROTHROMBIN TIME: CPT

## 2025-06-05 ENCOUNTER — HOSPITAL ENCOUNTER (OUTPATIENT)
Dept: CARDIAC REHAB | Age: 70
Discharge: HOME | End: 2025-06-05

## 2025-06-05 PROCEDURE — 9900000065 HC CARDIAC REHAB PHASE 3 - 1 VISIT

## 2025-06-10 ENCOUNTER — HOSPITAL ENCOUNTER (OUTPATIENT)
Dept: CARDIAC REHAB | Age: 70
Setting detail: THERAPIES SERIES
Discharge: HOME | End: 2025-06-10

## 2025-06-10 PROCEDURE — 9900000065 HC CARDIAC REHAB PHASE 3 - 1 VISIT

## 2025-06-12 ENCOUNTER — HOSPITAL ENCOUNTER (OUTPATIENT)
Dept: CARDIAC REHAB | Age: 70
Setting detail: THERAPIES SERIES
Discharge: HOME | End: 2025-06-12

## 2025-06-12 PROCEDURE — 9900000065 HC CARDIAC REHAB PHASE 3 - 1 VISIT

## 2025-06-17 ENCOUNTER — HOSPITAL ENCOUNTER (OUTPATIENT)
Age: 70
Discharge: HOME | End: 2025-06-17
Payer: MEDICARE

## 2025-06-17 ENCOUNTER — HOSPITAL ENCOUNTER (OUTPATIENT)
Dept: CARDIAC REHAB | Age: 70
Discharge: HOME | End: 2025-06-17

## 2025-06-17 LAB
ALBUMIN SERPL-MCNC: 4.3 G/DL (ref 3.5–5.2)
ALBUMIN/GLOB SERPL: 1.6 {RATIO} (ref 1–2.5)
ALP SERPL-CCNC: 85 U/L (ref 40–129)
ALT SERPL-CCNC: 22 U/L (ref 10–50)
ANION GAP SERPL CALCULATED.3IONS-SCNC: 11 MMOL/L (ref 9–16)
AST SERPL-CCNC: 28 U/L (ref 10–50)
BILIRUB SERPL-MCNC: 0.7 MG/DL (ref 0–1.2)
BNP SERPL-MCNC: 1605 PG/ML (ref 0–125)
BUN SERPL-MCNC: 31 MG/DL (ref 8–23)
BUN/CREAT SERPL: 22 (ref 9–20)
CALCIUM SERPL-MCNC: 9.8 MG/DL (ref 8.6–10.4)
CHLORIDE SERPL-SCNC: 103 MMOL/L (ref 98–107)
CO2 SERPL-SCNC: 25 MMOL/L (ref 20–31)
CREAT SERPL-MCNC: 1.4 MG/DL (ref 0.7–1.2)
ERYTHROCYTE [DISTWIDTH] IN BLOOD BY AUTOMATED COUNT: 12.8 % (ref 11.8–14.4)
GFR, ESTIMATED: 54 ML/MIN/1.73M2
GLUCOSE SERPL-MCNC: 92 MG/DL (ref 74–99)
HCT VFR BLD AUTO: 44.7 % (ref 40.7–50.3)
HGB BLD-MCNC: 14.9 G/DL (ref 13–17)
INR PPP: 1.7
LDH SERPL-CCNC: 181 U/L (ref 135–225)
MAGNESIUM SERPL-MCNC: 2.3 MG/DL (ref 1.6–2.4)
MCH RBC QN AUTO: 29 PG (ref 25.2–33.5)
MCHC RBC AUTO-ENTMCNC: 33.3 G/DL (ref 25.2–33.5)
MCV RBC AUTO: 87.1 FL (ref 82.6–102.9)
NRBC BLD-RTO: 0 PER 100 WBC
PLATELET # BLD AUTO: 193 K/UL (ref 138–453)
PMV BLD AUTO: 9.8 FL (ref 8.1–13.5)
POTASSIUM SERPL-SCNC: 4.9 MMOL/L (ref 3.7–5.3)
PROT SERPL-MCNC: 7 G/DL (ref 6.6–8.7)
PROTHROMBIN TIME: 20.7 SEC (ref 11.5–14.2)
RBC # BLD AUTO: 5.13 M/UL (ref 4.21–5.77)
SODIUM SERPL-SCNC: 139 MMOL/L (ref 136–145)
WBC OTHER # BLD: 8.3 K/UL (ref 3.5–11.3)

## 2025-06-17 PROCEDURE — 9900000065 HC CARDIAC REHAB PHASE 3 - 1 VISIT

## 2025-06-17 PROCEDURE — 83615 LACTATE (LD) (LDH) ENZYME: CPT

## 2025-06-17 PROCEDURE — 83735 ASSAY OF MAGNESIUM: CPT

## 2025-06-17 PROCEDURE — 36415 COLL VENOUS BLD VENIPUNCTURE: CPT

## 2025-06-17 PROCEDURE — 80053 COMPREHEN METABOLIC PANEL: CPT

## 2025-06-17 PROCEDURE — 85610 PROTHROMBIN TIME: CPT

## 2025-06-17 PROCEDURE — 83880 ASSAY OF NATRIURETIC PEPTIDE: CPT

## 2025-06-17 PROCEDURE — 85027 COMPLETE CBC AUTOMATED: CPT

## 2025-06-19 ENCOUNTER — HOSPITAL ENCOUNTER (OUTPATIENT)
Dept: CARDIAC REHAB | Age: 70
Discharge: HOME | End: 2025-06-19

## 2025-06-19 PROCEDURE — 9900000065 HC CARDIAC REHAB PHASE 3 - 1 VISIT

## 2025-06-24 ENCOUNTER — HOSPITAL ENCOUNTER (OUTPATIENT)
Dept: CARDIAC REHAB | Age: 70
Discharge: HOME | End: 2025-06-24

## 2025-06-24 PROCEDURE — 9900000065 HC CARDIAC REHAB PHASE 3 - 1 VISIT

## 2025-06-26 ENCOUNTER — HOSPITAL ENCOUNTER (OUTPATIENT)
Dept: CARDIAC REHAB | Age: 70
Discharge: HOME | End: 2025-06-26

## 2025-06-26 PROCEDURE — 9900000065 HC CARDIAC REHAB PHASE 3 - 1 VISIT

## 2025-07-01 ENCOUNTER — HOSPITAL ENCOUNTER (OUTPATIENT)
Age: 70
Discharge: HOME | End: 2025-07-01
Payer: MEDICARE

## 2025-07-01 ENCOUNTER — HOSPITAL ENCOUNTER (OUTPATIENT)
Dept: CARDIAC REHAB | Age: 70
Discharge: HOME | End: 2025-07-01

## 2025-07-01 LAB
ALBUMIN SERPL-MCNC: 4.3 G/DL (ref 3.5–5.2)
ALBUMIN/GLOB SERPL: 1.6 {RATIO} (ref 1–2.5)
ALP SERPL-CCNC: 86 U/L (ref 40–129)
ALT SERPL-CCNC: 18 U/L (ref 10–50)
ANION GAP SERPL CALCULATED.3IONS-SCNC: 10 MMOL/L (ref 9–16)
AST SERPL-CCNC: 25 U/L (ref 10–50)
BILIRUB SERPL-MCNC: 0.6 MG/DL (ref 0–1.2)
BNP SERPL-MCNC: 2377 PG/ML (ref 0–125)
BUN SERPL-MCNC: 29 MG/DL (ref 8–23)
BUN/CREAT SERPL: 26 (ref 9–20)
CALCIUM SERPL-MCNC: 9.6 MG/DL (ref 8.6–10.4)
CHLORIDE SERPL-SCNC: 104 MMOL/L (ref 98–107)
CO2 SERPL-SCNC: 25 MMOL/L (ref 20–31)
CREAT SERPL-MCNC: 1.1 MG/DL (ref 0.7–1.2)
ERYTHROCYTE [DISTWIDTH] IN BLOOD BY AUTOMATED COUNT: 12.9 % (ref 11.8–14.4)
GFR, ESTIMATED: 73 ML/MIN/1.73M2
GLUCOSE SERPL-MCNC: 88 MG/DL (ref 74–99)
HCT VFR BLD AUTO: 44.5 % (ref 40.7–50.3)
HGB BLD-MCNC: 14.9 G/DL (ref 13–17)
INR PPP: 2
LDH SERPL-CCNC: 174 U/L (ref 135–225)
MAGNESIUM SERPL-MCNC: 2.3 MG/DL (ref 1.6–2.4)
MCH RBC QN AUTO: 29.4 PG (ref 25.2–33.5)
MCHC RBC AUTO-ENTMCNC: 33.5 G/DL (ref 25.2–33.5)
MCV RBC AUTO: 87.8 FL (ref 82.6–102.9)
NRBC BLD-RTO: 0 PER 100 WBC
PLATELET # BLD AUTO: 197 K/UL (ref 138–453)
PMV BLD AUTO: 10.3 FL (ref 8.1–13.5)
POTASSIUM SERPL-SCNC: 4.8 MMOL/L (ref 3.7–5.3)
PROT SERPL-MCNC: 7 G/DL (ref 6.6–8.7)
PROTHROMBIN TIME: 23.1 SEC (ref 11.5–14.2)
RBC # BLD AUTO: 5.07 M/UL (ref 4.21–5.77)
SODIUM SERPL-SCNC: 139 MMOL/L (ref 136–145)
WBC OTHER # BLD: 8.3 K/UL (ref 3.5–11.3)

## 2025-07-01 PROCEDURE — 85027 COMPLETE CBC AUTOMATED: CPT

## 2025-07-01 PROCEDURE — 85610 PROTHROMBIN TIME: CPT

## 2025-07-01 PROCEDURE — 83880 ASSAY OF NATRIURETIC PEPTIDE: CPT

## 2025-07-01 PROCEDURE — 83615 LACTATE (LD) (LDH) ENZYME: CPT

## 2025-07-01 PROCEDURE — 83735 ASSAY OF MAGNESIUM: CPT

## 2025-07-01 PROCEDURE — 9900000065 HC CARDIAC REHAB PHASE 3 - 1 VISIT

## 2025-07-01 PROCEDURE — 80053 COMPREHEN METABOLIC PANEL: CPT

## 2025-07-01 PROCEDURE — 36415 COLL VENOUS BLD VENIPUNCTURE: CPT

## 2025-07-15 ENCOUNTER — HOSPITAL ENCOUNTER (OUTPATIENT)
Dept: CARDIAC REHAB | Age: 70
Discharge: HOME | End: 2025-07-15

## 2025-07-15 PROCEDURE — 9900000065 HC CARDIAC REHAB PHASE 3 - 1 VISIT

## 2025-07-17 ENCOUNTER — HOSPITAL ENCOUNTER (OUTPATIENT)
Dept: CARDIAC REHAB | Age: 70
Discharge: HOME | End: 2025-07-17

## 2025-07-17 PROCEDURE — 9900000065 HC CARDIAC REHAB PHASE 3 - 1 VISIT

## 2025-07-22 ENCOUNTER — HOSPITAL ENCOUNTER (OUTPATIENT)
Age: 70
Discharge: HOME | End: 2025-07-22
Payer: MEDICARE

## 2025-07-22 ENCOUNTER — HOSPITAL ENCOUNTER (OUTPATIENT)
Dept: CARDIAC REHAB | Age: 70
Setting detail: THERAPIES SERIES
Discharge: HOME | End: 2025-07-22

## 2025-07-22 LAB
ALBUMIN SERPL-MCNC: 4.1 G/DL (ref 3.5–5.2)
ALBUMIN/GLOB SERPL: 1.5 {RATIO} (ref 1–2.5)
ALP SERPL-CCNC: 88 U/L (ref 40–129)
ALT SERPL-CCNC: 21 U/L (ref 10–50)
ANION GAP SERPL CALCULATED.3IONS-SCNC: 8 MMOL/L (ref 9–16)
AST SERPL-CCNC: 27 U/L (ref 10–50)
BILIRUB SERPL-MCNC: 0.5 MG/DL (ref 0–1.2)
BNP SERPL-MCNC: 1357 PG/ML (ref 0–125)
BUN SERPL-MCNC: 26 MG/DL (ref 8–23)
BUN/CREAT SERPL: 20 (ref 9–20)
CALCIUM SERPL-MCNC: 9.5 MG/DL (ref 8.6–10.4)
CHLORIDE SERPL-SCNC: 101 MMOL/L (ref 98–107)
CO2 SERPL-SCNC: 28 MMOL/L (ref 20–31)
CREAT SERPL-MCNC: 1.3 MG/DL (ref 0.7–1.2)
ERYTHROCYTE [DISTWIDTH] IN BLOOD BY AUTOMATED COUNT: 13 % (ref 11.8–14.4)
GFR, ESTIMATED: 59 ML/MIN/1.73M2
GLUCOSE SERPL-MCNC: 98 MG/DL (ref 74–99)
HCT VFR BLD AUTO: 44.5 % (ref 40.7–50.3)
HGB BLD-MCNC: 14.9 G/DL (ref 13–17)
INR PPP: 2.1
LDH SERPL-CCNC: 200 U/L (ref 135–225)
MAGNESIUM SERPL-MCNC: 2.3 MG/DL (ref 1.6–2.4)
MCH RBC QN AUTO: 29.8 PG (ref 25.2–33.5)
MCHC RBC AUTO-ENTMCNC: 33.5 G/DL (ref 25.2–33.5)
MCV RBC AUTO: 89 FL (ref 82.6–102.9)
NRBC BLD-RTO: 0 PER 100 WBC
PLATELET # BLD AUTO: 199 K/UL (ref 138–453)
PMV BLD AUTO: 10 FL (ref 8.1–13.5)
POTASSIUM SERPL-SCNC: 4.7 MMOL/L (ref 3.7–5.3)
PROT SERPL-MCNC: 6.9 G/DL (ref 6.6–8.7)
PROTHROMBIN TIME: 24.7 SEC (ref 11.5–14.2)
RBC # BLD AUTO: 5 M/UL (ref 4.21–5.77)
SODIUM SERPL-SCNC: 137 MMOL/L (ref 136–145)
WBC OTHER # BLD: 8.2 K/UL (ref 3.5–11.3)

## 2025-07-22 PROCEDURE — 85610 PROTHROMBIN TIME: CPT

## 2025-07-22 PROCEDURE — 80053 COMPREHEN METABOLIC PANEL: CPT

## 2025-07-22 PROCEDURE — 85027 COMPLETE CBC AUTOMATED: CPT

## 2025-07-22 PROCEDURE — 83615 LACTATE (LD) (LDH) ENZYME: CPT

## 2025-07-22 PROCEDURE — 36415 COLL VENOUS BLD VENIPUNCTURE: CPT

## 2025-07-22 PROCEDURE — 83880 ASSAY OF NATRIURETIC PEPTIDE: CPT

## 2025-07-22 PROCEDURE — 9900000065 HC CARDIAC REHAB PHASE 3 - 1 VISIT

## 2025-07-22 PROCEDURE — 83735 ASSAY OF MAGNESIUM: CPT

## 2025-07-24 ENCOUNTER — HOSPITAL ENCOUNTER (OUTPATIENT)
Dept: CARDIAC REHAB | Age: 70
Discharge: HOME | End: 2025-07-24

## 2025-07-24 PROCEDURE — 9900000065 HC CARDIAC REHAB PHASE 3 - 1 VISIT

## 2025-07-29 ENCOUNTER — HOSPITAL ENCOUNTER (OUTPATIENT)
Dept: CARDIAC REHAB | Age: 70
Setting detail: THERAPIES SERIES
Discharge: HOME | End: 2025-07-29

## 2025-07-29 PROCEDURE — 9900000065 HC CARDIAC REHAB PHASE 3 - 1 VISIT

## 2025-07-31 ENCOUNTER — HOSPITAL ENCOUNTER (OUTPATIENT)
Dept: CARDIAC REHAB | Age: 70
Discharge: HOME | End: 2025-07-31

## 2025-07-31 PROCEDURE — 9900000065 HC CARDIAC REHAB PHASE 3 - 1 VISIT

## 2025-08-05 ENCOUNTER — HOSPITAL ENCOUNTER (OUTPATIENT)
Dept: CARDIAC REHAB | Age: 70
Discharge: HOME | End: 2025-08-05

## 2025-08-05 PROCEDURE — 9900000065 HC CARDIAC REHAB PHASE 3 - 1 VISIT

## 2025-08-12 ENCOUNTER — HOSPITAL ENCOUNTER (OUTPATIENT)
Dept: CARDIAC REHAB | Age: 70
Discharge: HOME | End: 2025-08-12

## 2025-08-12 PROCEDURE — 9900000065 HC CARDIAC REHAB PHASE 3 - 1 VISIT

## 2025-08-19 ENCOUNTER — HOSPITAL ENCOUNTER (OUTPATIENT)
Dept: CARDIAC REHAB | Age: 70
Setting detail: THERAPIES SERIES
Discharge: HOME | End: 2025-08-19

## 2025-08-19 PROCEDURE — 9900000065 HC CARDIAC REHAB PHASE 3 - 1 VISIT

## 2025-08-21 ENCOUNTER — HOSPITAL ENCOUNTER (OUTPATIENT)
Dept: CARDIAC REHAB | Age: 70
Discharge: HOME | End: 2025-08-21

## 2025-08-21 PROCEDURE — 9900000065 HC CARDIAC REHAB PHASE 3 - 1 VISIT

## 2025-08-26 ENCOUNTER — HOSPITAL ENCOUNTER (OUTPATIENT)
Dept: LAB | Age: 70
Discharge: HOME | End: 2025-08-26
Payer: MEDICARE

## 2025-08-26 ENCOUNTER — HOSPITAL ENCOUNTER (OUTPATIENT)
Dept: CARDIAC REHAB | Age: 70
Setting detail: THERAPIES SERIES
Discharge: HOME | End: 2025-08-26

## 2025-08-26 LAB
ALBUMIN SERPL-MCNC: 4.3 G/DL (ref 3.5–5.2)
ALBUMIN/GLOB SERPL: 1.5 {RATIO} (ref 1–2.5)
ALP SERPL-CCNC: 80 U/L (ref 40–129)
ALT SERPL-CCNC: 23 U/L (ref 10–50)
ANION GAP SERPL CALCULATED.3IONS-SCNC: 9 MMOL/L (ref 9–16)
AST SERPL-CCNC: 28 U/L (ref 10–50)
BILIRUB SERPL-MCNC: 0.5 MG/DL (ref 0–1.2)
BNP SERPL-MCNC: 1659 PG/ML (ref 0–125)
BUN SERPL-MCNC: 30 MG/DL (ref 8–23)
BUN/CREAT SERPL: 25 (ref 9–20)
CALCIUM SERPL-MCNC: 9.4 MG/DL (ref 8.6–10.4)
CHLORIDE SERPL-SCNC: 102 MMOL/L (ref 98–107)
CO2 SERPL-SCNC: 26 MMOL/L (ref 20–31)
CREAT SERPL-MCNC: 1.2 MG/DL (ref 0.7–1.2)
ERYTHROCYTE [DISTWIDTH] IN BLOOD BY AUTOMATED COUNT: 12.8 % (ref 11.8–14.4)
GFR, ESTIMATED: 65 ML/MIN/1.73M2
GLUCOSE SERPL-MCNC: 71 MG/DL (ref 74–99)
HCT VFR BLD AUTO: 44.7 % (ref 40.7–50.3)
HGB BLD-MCNC: 14.8 G/DL (ref 13–17)
INR PPP: 2.3
LDH SERPL-CCNC: 199 U/L (ref 135–225)
MAGNESIUM SERPL-MCNC: 2.3 MG/DL (ref 1.6–2.4)
MCH RBC QN AUTO: 29.4 PG (ref 25.2–33.5)
MCHC RBC AUTO-ENTMCNC: 33.1 G/DL (ref 25.2–33.5)
MCV RBC AUTO: 88.7 FL (ref 82.6–102.9)
NRBC BLD-RTO: 0 PER 100 WBC
PLATELET # BLD AUTO: 199 K/UL (ref 138–453)
PMV BLD AUTO: 10.3 FL (ref 8.1–13.5)
POTASSIUM SERPL-SCNC: 5 MMOL/L (ref 3.7–5.3)
PROT SERPL-MCNC: 7.2 G/DL (ref 6.6–8.7)
PROTHROMBIN TIME: 26.1 SEC (ref 11.5–14.2)
RBC # BLD AUTO: 5.04 M/UL (ref 4.21–5.77)
SODIUM SERPL-SCNC: 137 MMOL/L (ref 136–145)
WBC OTHER # BLD: 7.7 K/UL (ref 3.5–11.3)

## 2025-08-26 PROCEDURE — 9900000065 HC CARDIAC REHAB PHASE 3 - 1 VISIT

## 2025-08-26 PROCEDURE — 85027 COMPLETE CBC AUTOMATED: CPT

## 2025-08-26 PROCEDURE — 85610 PROTHROMBIN TIME: CPT

## 2025-08-26 PROCEDURE — 83735 ASSAY OF MAGNESIUM: CPT

## 2025-08-26 PROCEDURE — 36415 COLL VENOUS BLD VENIPUNCTURE: CPT

## 2025-08-26 PROCEDURE — 83615 LACTATE (LD) (LDH) ENZYME: CPT

## 2025-08-26 PROCEDURE — 83880 ASSAY OF NATRIURETIC PEPTIDE: CPT

## 2025-08-26 PROCEDURE — 80053 COMPREHEN METABOLIC PANEL: CPT

## 2025-08-28 ENCOUNTER — HOSPITAL ENCOUNTER (OUTPATIENT)
Dept: CARDIAC REHAB | Age: 70
Discharge: HOME | End: 2025-08-28

## 2025-08-28 PROCEDURE — 9900000065 HC CARDIAC REHAB PHASE 3 - 1 VISIT

## 2025-09-02 ENCOUNTER — HOSPITAL ENCOUNTER (OUTPATIENT)
Dept: CARDIAC REHAB | Age: 70
Discharge: HOME | End: 2025-09-02

## 2025-09-04 ENCOUNTER — HOSPITAL ENCOUNTER (OUTPATIENT)
Dept: CARDIAC REHAB | Age: 70
Discharge: HOME | End: 2025-09-04

## 2025-09-04 PROCEDURE — 9900000065 HC CARDIAC REHAB PHASE 3 - 1 VISIT

## (undated) DEVICE — MERCY DEFIANCE ENDO KIT: Brand: MEDLINE INDUSTRIES, INC.

## (undated) DEVICE — 60 ML SYRINGE REGULAR TIP: Brand: MONOJECT

## (undated) DEVICE — COLONOSCOPE ENDOSCP ABSORBENT SM PEDIATRIC 104 MM VISION

## (undated) DEVICE — 1200CC GUARDIAN II: Brand: GUARDIAN

## (undated) DEVICE — CANNULA NSL AD L2IN ETCO2 SAMP SFT CRUSH RESIST FEM AIRLFE

## (undated) DEVICE — CONNECTOR TBNG AUX H2O JET DISP FOR OLY 160/180 SER